# Patient Record
Sex: FEMALE | Race: WHITE | NOT HISPANIC OR LATINO | Employment: OTHER | URBAN - METROPOLITAN AREA
[De-identification: names, ages, dates, MRNs, and addresses within clinical notes are randomized per-mention and may not be internally consistent; named-entity substitution may affect disease eponyms.]

---

## 2023-05-19 PROBLEM — R29.6 MULTIPLE FALLS: Status: ACTIVE | Noted: 2023-05-19

## 2023-05-19 PROBLEM — R19.7 DIARRHEA: Status: ACTIVE | Noted: 2023-05-19

## 2023-05-19 PROBLEM — I95.1 CHRONIC ORTHOSTATIC HYPOTENSION: Status: ACTIVE | Noted: 2023-05-19

## 2023-05-19 PROBLEM — R35.1 NOCTURIA: Status: ACTIVE | Noted: 2023-05-19

## 2023-05-19 PROBLEM — N32.81 OVERACTIVE BLADDER: Status: ACTIVE | Noted: 2023-05-19

## 2023-05-19 PROBLEM — R39.15 URINARY URGENCY: Status: ACTIVE | Noted: 2023-05-19

## 2023-05-19 PROBLEM — N39.0 URINARY TRACT INFECTION: Status: ACTIVE | Noted: 2023-05-19

## 2023-05-19 PROBLEM — N36.41 URETHRAL HYPERMOBILITY: Status: ACTIVE | Noted: 2023-05-19

## 2023-05-19 PROBLEM — R07.89 CHEST PAIN, MIDSTERNAL: Status: ACTIVE | Noted: 2023-05-19

## 2023-05-19 PROBLEM — H40.1111 PRIMARY OPEN ANGLE GLAUCOMA (POAG) OF RIGHT EYE, MILD STAGE: Status: ACTIVE | Noted: 2023-05-19

## 2023-05-19 PROBLEM — H47.013 ISCHEMIC OPTIC NEUROPATHY, BILATERAL: Status: ACTIVE | Noted: 2023-05-19

## 2023-05-19 PROBLEM — N39.3 STRESS INCONTINENCE, FEMALE: Status: ACTIVE | Noted: 2023-05-19

## 2023-05-19 PROBLEM — H40.1190 PRIMARY OPEN ANGLE GLAUCOMA: Status: ACTIVE | Noted: 2023-05-19

## 2023-05-19 PROBLEM — R30.0 DYSURIA: Status: ACTIVE | Noted: 2023-05-19

## 2023-05-19 PROBLEM — I10 HTN (HYPERTENSION): Status: ACTIVE | Noted: 2023-05-19

## 2023-05-19 PROBLEM — H40.1132 CHRONIC OPEN ANGLE GLAUCOMA OF BOTH EYES, MODERATE STAGE: Status: ACTIVE | Noted: 2023-05-19

## 2023-05-19 PROBLEM — H91.91 HEARING LOSS, RIGHT: Status: ACTIVE | Noted: 2023-05-19

## 2023-05-19 PROBLEM — F03.90 DEMENTIA (MULTI): Status: ACTIVE | Noted: 2023-05-19

## 2023-05-19 PROBLEM — R10.9 ABDOMINAL CRAMPING: Status: ACTIVE | Noted: 2023-05-19

## 2023-05-19 PROBLEM — K62.5 RECTAL BLEEDING: Status: ACTIVE | Noted: 2023-05-19

## 2023-05-19 PROBLEM — H40.9 GLAUCOMA: Status: ACTIVE | Noted: 2023-05-19

## 2023-05-19 PROBLEM — N39.41 URGE INCONTINENCE OF URINE: Status: ACTIVE | Noted: 2023-05-19

## 2023-05-19 PROBLEM — Z96.1 PSEUDOPHAKIA OF BOTH EYES: Status: ACTIVE | Noted: 2023-05-19

## 2023-05-19 PROBLEM — R09.81 NASAL CONGESTION: Status: ACTIVE | Noted: 2023-05-19

## 2023-05-19 PROBLEM — R94.31 ABNORMAL EKG: Status: ACTIVE | Noted: 2023-05-19

## 2023-05-19 PROBLEM — R41.3 MEMORY CHANGES: Status: ACTIVE | Noted: 2023-05-19

## 2023-05-19 RX ORDER — BIMATOPROST 0.1 MG/ML
SOLUTION/ DROPS OPHTHALMIC
COMMUNITY
End: 2024-06-07 | Stop reason: WASHOUT

## 2023-05-19 RX ORDER — MEMANTINE HYDROCHLORIDE 10 MG/1
1 TABLET ORAL 2 TIMES DAILY
COMMUNITY
Start: 2022-04-22 | End: 2024-05-08 | Stop reason: SDUPTHER

## 2023-05-19 RX ORDER — LISINOPRIL 40 MG/1
1 TABLET ORAL DAILY
COMMUNITY
End: 2023-10-02

## 2023-05-19 RX ORDER — LEVOFLOXACIN 250 MG/1
1 TABLET ORAL DAILY
COMMUNITY
Start: 2022-03-30 | End: 2023-05-26 | Stop reason: ALTCHOICE

## 2023-05-19 RX ORDER — VIBEGRON 75 MG/1
1 TABLET, FILM COATED ORAL EVERY OTHER DAY
COMMUNITY
Start: 2022-10-07 | End: 2023-10-10 | Stop reason: SDUPTHER

## 2023-05-19 RX ORDER — MIRABEGRON 25 MG/1
1 TABLET, FILM COATED, EXTENDED RELEASE ORAL DAILY
COMMUNITY
End: 2023-05-26

## 2023-05-19 RX ORDER — LATANOPROST 50 UG/ML
1 SOLUTION/ DROPS OPHTHALMIC
COMMUNITY
Start: 2020-12-04 | End: 2023-11-30 | Stop reason: WASHOUT

## 2023-05-19 RX ORDER — VIT C/E/ZN/COPPR/LUTEIN/ZEAXAN 250MG-90MG
CAPSULE ORAL
COMMUNITY

## 2023-05-19 RX ORDER — DONEPEZIL HYDROCHLORIDE 5 MG/1
TABLET, FILM COATED ORAL
COMMUNITY
End: 2023-11-30 | Stop reason: WASHOUT

## 2023-05-19 RX ORDER — ESCITALOPRAM OXALATE 5 MG/1
1 TABLET ORAL DAILY
COMMUNITY
Start: 2023-01-25 | End: 2023-05-26

## 2023-05-19 RX ORDER — AZELASTINE 1 MG/ML
SPRAY, METERED NASAL 2 TIMES DAILY
COMMUNITY
End: 2023-11-30 | Stop reason: WASHOUT

## 2023-05-19 RX ORDER — OMEPRAZOLE 20 MG/1
20 CAPSULE, DELAYED RELEASE ORAL DAILY PRN
COMMUNITY
Start: 2022-12-08 | End: 2024-06-07 | Stop reason: WASHOUT

## 2023-05-19 RX ORDER — AMLODIPINE BESYLATE 10 MG/1
1 TABLET ORAL DAILY
COMMUNITY
Start: 2022-03-30 | End: 2024-01-24 | Stop reason: SDUPTHER

## 2023-05-26 ENCOUNTER — OFFICE VISIT (OUTPATIENT)
Dept: PRIMARY CARE | Facility: CLINIC | Age: 88
End: 2023-05-26
Payer: MEDICARE

## 2023-05-26 VITALS
HEIGHT: 60 IN | SYSTOLIC BLOOD PRESSURE: 126 MMHG | HEART RATE: 62 BPM | DIASTOLIC BLOOD PRESSURE: 64 MMHG | RESPIRATION RATE: 18 BRPM | BODY MASS INDEX: 31.22 KG/M2 | WEIGHT: 159 LBS

## 2023-05-26 DIAGNOSIS — N32.81 OVERACTIVE BLADDER: ICD-10-CM

## 2023-05-26 DIAGNOSIS — Z00.00 ROUTINE GENERAL MEDICAL EXAMINATION AT A HEALTH CARE FACILITY: ICD-10-CM

## 2023-05-26 DIAGNOSIS — Z00.00 MEDICARE ANNUAL WELLNESS VISIT, SUBSEQUENT: Primary | ICD-10-CM

## 2023-05-26 DIAGNOSIS — F03.B0 MODERATE DEMENTIA, UNSPECIFIED DEMENTIA TYPE, UNSPECIFIED WHETHER BEHAVIORAL, PSYCHOTIC, OR MOOD DISTURBANCE OR ANXIETY (MULTI): ICD-10-CM

## 2023-05-26 DIAGNOSIS — H40.1132 CHRONIC OPEN ANGLE GLAUCOMA OF BOTH EYES, MODERATE STAGE: ICD-10-CM

## 2023-05-26 DIAGNOSIS — R39.15 URINARY URGENCY: ICD-10-CM

## 2023-05-26 DIAGNOSIS — I10 PRIMARY HYPERTENSION: ICD-10-CM

## 2023-05-26 PROBLEM — K62.5 RECTAL BLEEDING: Status: RESOLVED | Noted: 2023-05-19 | Resolved: 2023-05-26

## 2023-05-26 PROBLEM — R94.31 ABNORMAL EKG: Status: RESOLVED | Noted: 2023-05-19 | Resolved: 2023-05-26

## 2023-05-26 PROBLEM — N39.0 URINARY TRACT INFECTION: Status: RESOLVED | Noted: 2023-05-19 | Resolved: 2023-05-26

## 2023-05-26 PROBLEM — R19.7 DIARRHEA: Status: RESOLVED | Noted: 2023-05-19 | Resolved: 2023-05-26

## 2023-05-26 PROBLEM — R07.89 CHEST PAIN, MIDSTERNAL: Status: RESOLVED | Noted: 2023-05-19 | Resolved: 2023-05-26

## 2023-05-26 PROBLEM — R35.1 NOCTURIA: Status: RESOLVED | Noted: 2023-05-19 | Resolved: 2023-05-26

## 2023-05-26 PROBLEM — H47.013 ISCHEMIC OPTIC NEUROPATHY, BILATERAL: Status: RESOLVED | Noted: 2023-05-19 | Resolved: 2023-05-26

## 2023-05-26 PROBLEM — R30.0 DYSURIA: Status: RESOLVED | Noted: 2023-05-19 | Resolved: 2023-05-26

## 2023-05-26 PROBLEM — R29.6 MULTIPLE FALLS: Status: RESOLVED | Noted: 2023-05-19 | Resolved: 2023-05-26

## 2023-05-26 PROBLEM — R10.9 ABDOMINAL CRAMPING: Status: RESOLVED | Noted: 2023-05-19 | Resolved: 2023-05-26

## 2023-05-26 PROBLEM — R09.81 NASAL CONGESTION: Status: RESOLVED | Noted: 2023-05-19 | Resolved: 2023-05-26

## 2023-05-26 PROBLEM — I95.1 CHRONIC ORTHOSTATIC HYPOTENSION: Status: RESOLVED | Noted: 2023-05-19 | Resolved: 2023-05-26

## 2023-05-26 LAB
ALANINE AMINOTRANSFERASE (SGPT) (U/L) IN SER/PLAS: 18 U/L (ref 7–45)
ALBUMIN (G/DL) IN SER/PLAS: 3.9 G/DL (ref 3.4–5)
ALKALINE PHOSPHATASE (U/L) IN SER/PLAS: 86 U/L (ref 33–136)
ANION GAP IN SER/PLAS: 13 MMOL/L (ref 10–20)
ASPARTATE AMINOTRANSFERASE (SGOT) (U/L) IN SER/PLAS: 22 U/L (ref 9–39)
BILIRUBIN TOTAL (MG/DL) IN SER/PLAS: 0.5 MG/DL (ref 0–1.2)
CALCIUM (MG/DL) IN SER/PLAS: 9.5 MG/DL (ref 8.6–10.6)
CARBON DIOXIDE, TOTAL (MMOL/L) IN SER/PLAS: 28 MMOL/L (ref 21–32)
CHLORIDE (MMOL/L) IN SER/PLAS: 104 MMOL/L (ref 98–107)
CREATININE (MG/DL) IN SER/PLAS: 0.84 MG/DL (ref 0.5–1.05)
ERYTHROCYTE DISTRIBUTION WIDTH (RATIO) BY AUTOMATED COUNT: 13.2 % (ref 11.5–14.5)
ERYTHROCYTE MEAN CORPUSCULAR HEMOGLOBIN CONCENTRATION (G/DL) BY AUTOMATED: 30.9 G/DL (ref 32–36)
ERYTHROCYTE MEAN CORPUSCULAR VOLUME (FL) BY AUTOMATED COUNT: 100 FL (ref 80–100)
ERYTHROCYTES (10*6/UL) IN BLOOD BY AUTOMATED COUNT: 4.3 X10E12/L (ref 4–5.2)
GFR FEMALE: 67 ML/MIN/1.73M2
GLUCOSE (MG/DL) IN SER/PLAS: 117 MG/DL (ref 74–99)
HEMATOCRIT (%) IN BLOOD BY AUTOMATED COUNT: 43.1 % (ref 36–46)
HEMOGLOBIN (G/DL) IN BLOOD: 13.3 G/DL (ref 12–16)
LEUKOCYTES (10*3/UL) IN BLOOD BY AUTOMATED COUNT: 5.9 X10E9/L (ref 4.4–11.3)
NRBC (PER 100 WBCS) BY AUTOMATED COUNT: 0 /100 WBC (ref 0–0)
PLATELETS (10*3/UL) IN BLOOD AUTOMATED COUNT: 267 X10E9/L (ref 150–450)
POTASSIUM (MMOL/L) IN SER/PLAS: 4 MMOL/L (ref 3.5–5.3)
PROTEIN TOTAL: 6.8 G/DL (ref 6.4–8.2)
SODIUM (MMOL/L) IN SER/PLAS: 141 MMOL/L (ref 136–145)
UREA NITROGEN (MG/DL) IN SER/PLAS: 28 MG/DL (ref 6–23)

## 2023-05-26 PROCEDURE — 3074F SYST BP LT 130 MM HG: CPT | Performed by: INTERNAL MEDICINE

## 2023-05-26 PROCEDURE — 1170F FXNL STATUS ASSESSED: CPT | Performed by: INTERNAL MEDICINE

## 2023-05-26 PROCEDURE — 3078F DIAST BP <80 MM HG: CPT | Performed by: INTERNAL MEDICINE

## 2023-05-26 PROCEDURE — G0439 PPPS, SUBSEQ VISIT: HCPCS | Performed by: INTERNAL MEDICINE

## 2023-05-26 PROCEDURE — 99397 PER PM REEVAL EST PAT 65+ YR: CPT | Performed by: INTERNAL MEDICINE

## 2023-05-26 PROCEDURE — 1159F MED LIST DOCD IN RCRD: CPT | Performed by: INTERNAL MEDICINE

## 2023-05-26 PROCEDURE — 1160F RVW MEDS BY RX/DR IN RCRD: CPT | Performed by: INTERNAL MEDICINE

## 2023-05-26 PROCEDURE — 80053 COMPREHEN METABOLIC PANEL: CPT

## 2023-05-26 PROCEDURE — 85027 COMPLETE CBC AUTOMATED: CPT

## 2023-05-26 PROCEDURE — 1036F TOBACCO NON-USER: CPT | Performed by: INTERNAL MEDICINE

## 2023-05-26 ASSESSMENT — ENCOUNTER SYMPTOMS
HEADACHES: 0
PHOTOPHOBIA: 0
CHOKING: 0
LOSS OF SENSATION IN FEET: 0
ABDOMINAL PAIN: 1
POLYPHAGIA: 0
APNEA: 0
ACTIVITY CHANGE: 0
FEVER: 0
NERVOUS/ANXIOUS: 0
VOICE CHANGE: 0
EYE REDNESS: 0
MYALGIAS: 0
SEIZURES: 0
CHEST TIGHTNESS: 0
COUGH: 0
ADENOPATHY: 0
DECREASED CONCENTRATION: 0
DIZZINESS: 0
DIAPHORESIS: 0
EYE PAIN: 0
AGITATION: 0
TREMORS: 0
WEAKNESS: 0
ARTHRALGIAS: 0
PALPITATIONS: 0
SPEECH DIFFICULTY: 0
DYSPHORIC MOOD: 0
POLYDIPSIA: 0
FACIAL ASYMMETRY: 0
TROUBLE SWALLOWING: 0
RHINORRHEA: 0
EYE ITCHING: 0
BLOOD IN STOOL: 0
UNEXPECTED WEIGHT CHANGE: 0
NECK STIFFNESS: 0
SINUS PRESSURE: 0
BACK PAIN: 0
APPETITE CHANGE: 0
DYSURIA: 0
BRUISES/BLEEDS EASILY: 0
EYE DISCHARGE: 0
ANAL BLEEDING: 0
WHEEZING: 0
RECTAL PAIN: 0
HYPERACTIVE: 0
SHORTNESS OF BREATH: 0
ABDOMINAL DISTENTION: 0
VOMITING: 0
DEPRESSION: 0
HALLUCINATIONS: 0
FLANK PAIN: 0
CONSTIPATION: 0
NAUSEA: 0
OCCASIONAL FEELINGS OF UNSTEADINESS: 1
FACIAL SWELLING: 0
SORE THROAT: 0
HEMATURIA: 0
WOUND: 0
DIARRHEA: 0
SLEEP DISTURBANCE: 0
LIGHT-HEADEDNESS: 0
NECK PAIN: 0
FREQUENCY: 1
SINUS PAIN: 0
CHILLS: 0
STRIDOR: 0
COLOR CHANGE: 0
FATIGUE: 0
DIFFICULTY URINATING: 0
CONFUSION: 1
NUMBNESS: 0
JOINT SWELLING: 0

## 2023-05-26 ASSESSMENT — ACTIVITIES OF DAILY LIVING (ADL)
TAKING_MEDICATION: TOTAL CARE
BATHING: INDEPENDENT
DRESSING: INDEPENDENT
GROCERY_SHOPPING: TOTAL CARE
MANAGING_FINANCES: TOTAL CARE
DOING_HOUSEWORK: NEEDS ASSISTANCE

## 2023-05-26 ASSESSMENT — PATIENT HEALTH QUESTIONNAIRE - PHQ9
SUM OF ALL RESPONSES TO PHQ9 QUESTIONS 1 AND 2: 0
2. FEELING DOWN, DEPRESSED OR HOPELESS: NOT AT ALL
1. LITTLE INTEREST OR PLEASURE IN DOING THINGS: NOT AT ALL

## 2023-05-26 NOTE — PROGRESS NOTES
Subjective   Reason for Visit: Jessy Masterson is an 87 y.o. female here for a Medicare Wellness visit.          Reviewed all medications by prescribing practitioner or clinical pharmacist (such as prescriptions, OTCs, herbal therapies and supplements) and documented in the medical record.    HPI  Pt here for MWV.  She is down in weight-10 lbs since last visit.  She does not get flu shots.  She only had 2 Covid vaccines.  Her daughter is not aware if she had pneumonia or shingles vaccines when living in [place] (moved here in 2020).      She is on medication for her memory.  She saw Dr. Arambula for this.  She was started on Lexapro but her daughter has since stopped use.  She does not feel her mother is depressed or anxious.  She is very calm.      She is on medication for OAB.  She uses the medication every other day as she gets samples from urologist.  She does note improvements in frequency.      She saw eye doc in March for her Glaucoma.  She is on two eye drops for this.          Patient Care Team:  Angelita CHAPMAN DO as PCP - General  Angelita CHAPMAN DO as PCP - [employer] PCP     Review of Systems   Constitutional:  Negative for activity change, appetite change, chills, diaphoresis, fatigue, fever and unexpected weight change.   HENT:  Positive for hearing loss. Negative for congestion, dental problem, drooling, ear discharge, ear pain, facial swelling, mouth sores, nosebleeds, postnasal drip, rhinorrhea, sinus pressure, sinus pain, sneezing, sore throat, tinnitus, trouble swallowing and voice change.    Eyes:  Positive for visual disturbance (goes regularly to eye doc due to glaucoma history). Negative for photophobia, pain, discharge, redness and itching.   Respiratory:  Negative for apnea, cough, choking, chest tightness, shortness of breath, wheezing and stridor.    Cardiovascular:  Negative for chest pain, palpitations and leg swelling.   Gastrointestinal:  Positive for abdominal pain  (+epigastric pain). Negative for abdominal distention, anal bleeding, blood in stool, constipation, diarrhea, nausea, rectal pain and vomiting.   Endocrine: Negative for cold intolerance, heat intolerance, polydipsia, polyphagia and polyuria.   Genitourinary:  Positive for frequency. Negative for decreased urine volume, difficulty urinating, dyspareunia, dysuria, enuresis, flank pain, genital sores, hematuria, menstrual problem, pelvic pain, urgency, vaginal bleeding, vaginal discharge and vaginal pain.   Musculoskeletal:  Negative for arthralgias, back pain, gait problem, joint swelling, myalgias, neck pain and neck stiffness.   Skin:  Negative for color change, rash and wound.   Allergic/Immunologic: Negative for environmental allergies and food allergies.   Neurological:  Negative for dizziness, tremors, seizures, syncope, facial asymmetry, speech difficulty, weakness, light-headedness, numbness and headaches.   Hematological:  Negative for adenopathy. Does not bruise/bleed easily.   Psychiatric/Behavioral:  Positive for confusion. Negative for agitation, behavioral problems, decreased concentration, dysphoric mood, hallucinations, self-injury, sleep disturbance and suicidal ideas. The patient is not nervous/anxious and is not hyperactive.        Objective   Vitals:  /64   Pulse 62   Resp 18   Ht 1.524 m (5')   Wt 72.1 kg (159 lb)   BMI 31.05 kg/m²       Physical Exam  Constitutional:       Appearance: Normal appearance. She is obese.   HENT:      Right Ear: Tympanic membrane normal.      Left Ear: Tympanic membrane normal.      Nose: Nose normal.      Mouth/Throat:      Mouth: Mucous membranes are moist.      Pharynx: Oropharynx is clear.   Eyes:      Extraocular Movements: Extraocular movements intact.      Conjunctiva/sclera: Conjunctivae normal.      Pupils: Pupils are equal, round, and reactive to light.   Cardiovascular:      Rate and Rhythm: Normal rate and regular rhythm.      Heart sounds:  Normal heart sounds.   Pulmonary:      Effort: Pulmonary effort is normal.      Breath sounds: Normal breath sounds.   Abdominal:      General: Abdomen is flat. Bowel sounds are normal. There is no distension.      Palpations: Abdomen is soft. There is no mass.      Tenderness: There is no abdominal tenderness.   Musculoskeletal:         General: No swelling or tenderness. Normal range of motion.   Lymphadenopathy:      Cervical: No cervical adenopathy.   Skin:     General: Skin is warm and dry.   Neurological:      Mental Status: She is alert and oriented to person, place, and time.   Psychiatric:         Mood and Affect: Mood normal.         Assessment/Plan   Problem List Items Addressed This Visit          Nervous    Dementia (CMS/HCC)    Current Assessment & Plan     On Namenda BID  Memory stable; no behavioral issues at this time  Not on Lexapro as daughter doesn't feel her mood is an issue         Relevant Orders    Follow Up In Primary Care       Circulatory    HTN (hypertension)    Current Assessment & Plan     Well controlled on Amlodipine/Lisinopril          Relevant Orders    Comprehensive Metabolic Panel    CBC    Follow Up In Primary Care       Genitourinary    Overactive bladder    Current Assessment & Plan     Gets samples of Gemtesa from urology  This is helpful; she takes every other day             Other    Chronic open angle glaucoma of both eyes, moderate stage    Current Assessment & Plan     Sees optho regularly  On 2 drops          Urinary urgency     Other Visit Diagnoses       Medicare annual wellness visit, subsequent    -  Primary    Routine general medical examination at a health care facility

## 2023-05-26 NOTE — PATIENT INSTRUCTIONS
Take the Omeprazole 20 mg daily for next 7-10 days and then can try to wean off again; if she complains of that mid abdominal upper discomfort then restart use and use daily  We did a bit of blood work today-will call if anything is abnormal  Ask your brother if mom got her Pneumonia and Shingles vaccines when in [place]  Continue medications as directed  Follow up here in 6 months

## 2023-05-26 NOTE — ASSESSMENT & PLAN NOTE
On Namenda BID  Memory stable; no behavioral issues at this time  Not on Lexapro as daughter doesn't feel her mood is an issue

## 2023-10-02 DIAGNOSIS — I10 HYPERTENSION, UNSPECIFIED TYPE: ICD-10-CM

## 2023-10-02 PROBLEM — J30.0 VASOMOTOR RHINITIS: Status: ACTIVE | Noted: 2022-09-10

## 2023-10-02 PROBLEM — R49.0 CHRONIC HOARSENESS: Status: ACTIVE | Noted: 2022-09-10

## 2023-10-02 PROBLEM — J37.0 CHRONIC LARYNGITIS: Status: ACTIVE | Noted: 2022-09-10

## 2023-10-02 PROBLEM — K21.9 GASTROESOPHAGEAL REFLUX DISEASE WITHOUT ESOPHAGITIS: Status: ACTIVE | Noted: 2022-09-10

## 2023-10-02 PROBLEM — H90.3 SENSORINEURAL HEARING LOSS, BILATERAL: Status: ACTIVE | Noted: 2022-12-17

## 2023-10-02 RX ORDER — LISINOPRIL 40 MG/1
40 TABLET ORAL DAILY
Qty: 90 TABLET | Refills: 3 | Status: SHIPPED | OUTPATIENT
Start: 2023-10-02

## 2023-10-10 DIAGNOSIS — N32.81 OAB (OVERACTIVE BLADDER): Primary | ICD-10-CM

## 2023-10-10 RX ORDER — VIBEGRON 75 MG/1
1 TABLET, FILM COATED ORAL EVERY OTHER DAY
Qty: 30 TABLET | Refills: 3 | Status: SHIPPED | OUTPATIENT
Start: 2023-10-10 | End: 2023-11-30 | Stop reason: WASHOUT

## 2023-11-09 PROBLEM — R09.89 CHRONIC THROAT CLEARING: Status: ACTIVE | Noted: 2023-11-09

## 2023-11-09 PROBLEM — R07.89 CHEST PAIN, MIDSTERNAL: Status: ACTIVE | Noted: 2023-11-09

## 2023-11-09 PROBLEM — I95.1 CHRONIC ORTHOSTATIC HYPOTENSION: Status: ACTIVE | Noted: 2023-11-09

## 2023-11-09 PROBLEM — J38.02 BILATERAL PARTIAL VOCAL CORD PARALYSIS: Status: ACTIVE | Noted: 2023-11-09

## 2023-11-09 PROBLEM — R94.31 ABNORMAL EKG: Status: ACTIVE | Noted: 2023-11-09

## 2023-11-09 PROBLEM — R10.9 ABDOMINAL CRAMPING: Status: ACTIVE | Noted: 2023-11-09

## 2023-11-09 PROBLEM — J30.1 ALLERGIC RHINITIS DUE TO POLLEN: Status: ACTIVE | Noted: 2023-11-09

## 2023-11-09 PROBLEM — R09.81 NASAL CONGESTION: Status: ACTIVE | Noted: 2023-11-09

## 2023-11-09 PROBLEM — R29.6 MULTIPLE FALLS: Status: ACTIVE | Noted: 2023-11-09

## 2023-11-09 PROBLEM — R19.7 DIARRHEA: Status: ACTIVE | Noted: 2023-11-09

## 2023-11-09 PROBLEM — K62.5 RECTAL BLEEDING: Status: ACTIVE | Noted: 2023-11-09

## 2023-11-09 PROBLEM — H47.013 ISCHEMIC OPTIC NEUROPATHY, BILATERAL: Status: ACTIVE | Noted: 2023-11-09

## 2023-11-09 PROBLEM — H40.9 GLAUCOMA: Status: ACTIVE | Noted: 2023-11-09

## 2023-11-09 PROBLEM — H90.3 ASYMMETRICAL SENSORINEURAL HEARING LOSS: Status: ACTIVE | Noted: 2022-12-17

## 2023-11-09 PROBLEM — R35.1 NOCTURIA: Status: ACTIVE | Noted: 2023-11-09

## 2023-11-09 PROBLEM — N39.0 URINARY TRACT INFECTION: Status: ACTIVE | Noted: 2023-11-09

## 2023-11-09 PROBLEM — R41.3 MEMORY CHANGES: Status: ACTIVE | Noted: 2023-11-09

## 2023-11-09 PROBLEM — R30.0 DYSURIA: Status: ACTIVE | Noted: 2023-11-09

## 2023-11-09 PROBLEM — H40.1111 PRIMARY OPEN ANGLE GLAUCOMA (POAG) OF RIGHT EYE, MILD STAGE: Status: ACTIVE | Noted: 2023-11-09

## 2023-11-09 PROBLEM — H40.1190 PRIMARY OPEN ANGLE GLAUCOMA: Status: ACTIVE | Noted: 2023-11-09

## 2023-11-09 RX ORDER — MONTELUKAST SODIUM 10 MG/1
1 TABLET ORAL DAILY
COMMUNITY
End: 2023-11-30 | Stop reason: WASHOUT

## 2023-11-09 RX ORDER — MINERAL OIL
1 ENEMA (ML) RECTAL DAILY
COMMUNITY
Start: 2023-03-18 | End: 2023-11-30 | Stop reason: WASHOUT

## 2023-11-09 RX ORDER — IPRATROPIUM BROMIDE 42 UG/1
SPRAY, METERED NASAL
COMMUNITY
End: 2023-11-30 | Stop reason: WASHOUT

## 2023-11-09 RX ORDER — DONEPEZIL HYDROCHLORIDE 5 MG/1
1 TABLET, FILM COATED ORAL DAILY
COMMUNITY
End: 2023-11-30 | Stop reason: WASHOUT

## 2023-11-09 RX ORDER — PANTOPRAZOLE SODIUM 20 MG/1
1 TABLET, DELAYED RELEASE ORAL DAILY
COMMUNITY
End: 2023-11-30 | Stop reason: WASHOUT

## 2023-11-09 RX ORDER — AMLODIPINE BESYLATE 5 MG/1
5 TABLET ORAL DAILY
COMMUNITY
End: 2023-11-30 | Stop reason: WASHOUT

## 2023-11-09 RX ORDER — OMEPRAZOLE 20 MG/1
1 CAPSULE, DELAYED RELEASE ORAL
COMMUNITY
Start: 2022-12-08 | End: 2023-11-30 | Stop reason: WASHOUT

## 2023-11-09 RX ORDER — DULOXETIN HYDROCHLORIDE 20 MG/1
20 CAPSULE, DELAYED RELEASE ORAL DAILY
COMMUNITY
End: 2023-11-30 | Stop reason: WASHOUT

## 2023-11-09 RX ORDER — MEMANTINE HYDROCHLORIDE 5 MG/1
TABLET ORAL
COMMUNITY
End: 2023-11-30 | Stop reason: WASHOUT

## 2023-11-09 RX ORDER — NITROFURANTOIN 25; 75 MG/1; MG/1
CAPSULE ORAL
COMMUNITY
End: 2023-11-30 | Stop reason: ALTCHOICE

## 2023-11-09 RX ORDER — VIBEGRON 75 MG/1
1 TABLET, FILM COATED ORAL DAILY
COMMUNITY
Start: 2022-10-07 | End: 2023-11-30 | Stop reason: WASHOUT

## 2023-11-09 RX ORDER — LEVOFLOXACIN 250 MG/1
1 TABLET ORAL DAILY
COMMUNITY
End: 2023-11-30 | Stop reason: WASHOUT

## 2023-11-09 RX ORDER — ESCITALOPRAM OXALATE 5 MG/1
1 TABLET ORAL DAILY
COMMUNITY
End: 2023-11-30 | Stop reason: WASHOUT

## 2023-11-09 RX ORDER — NAPROXEN 375 MG/1
1 TABLET ORAL 2 TIMES DAILY
COMMUNITY
End: 2023-11-30 | Stop reason: WASHOUT

## 2023-11-09 RX ORDER — METOPROLOL SUCCINATE 25 MG/1
1 TABLET, EXTENDED RELEASE ORAL DAILY
COMMUNITY
End: 2023-11-30 | Stop reason: WASHOUT

## 2023-11-09 RX ORDER — FLUTICASONE PROPIONATE 50 MCG
SPRAY, SUSPENSION (ML) NASAL
COMMUNITY
End: 2023-11-30 | Stop reason: WASHOUT

## 2023-11-09 RX ORDER — BIMATOPROST 0.1 MG/ML
1 SOLUTION/ DROPS OPHTHALMIC NIGHTLY
COMMUNITY
End: 2023-11-30 | Stop reason: WASHOUT

## 2023-11-09 RX ORDER — ESTRADIOL 0.1 MG/G
CREAM VAGINAL
COMMUNITY
End: 2023-11-30 | Stop reason: WASHOUT

## 2023-11-09 RX ORDER — OMEPRAZOLE 40 MG/1
1 CAPSULE, DELAYED RELEASE ORAL DAILY
COMMUNITY
End: 2023-11-30 | Stop reason: WASHOUT

## 2023-11-30 ENCOUNTER — OFFICE VISIT (OUTPATIENT)
Dept: PRIMARY CARE | Facility: CLINIC | Age: 88
End: 2023-11-30
Payer: MEDICARE

## 2023-11-30 ENCOUNTER — TELEPHONE (OUTPATIENT)
Dept: PRIMARY CARE | Facility: CLINIC | Age: 88
End: 2023-11-30

## 2023-11-30 VITALS
BODY MASS INDEX: 32.36 KG/M2 | SYSTOLIC BLOOD PRESSURE: 124 MMHG | WEIGHT: 165.7 LBS | HEART RATE: 84 BPM | DIASTOLIC BLOOD PRESSURE: 62 MMHG

## 2023-11-30 DIAGNOSIS — N32.81 OVERACTIVE BLADDER: ICD-10-CM

## 2023-11-30 DIAGNOSIS — F03.B0 MODERATE DEMENTIA, UNSPECIFIED DEMENTIA TYPE, UNSPECIFIED WHETHER BEHAVIORAL, PSYCHOTIC, OR MOOD DISTURBANCE OR ANXIETY (MULTI): ICD-10-CM

## 2023-11-30 DIAGNOSIS — R23.4 CRACKED SKIN ON FEET: ICD-10-CM

## 2023-11-30 DIAGNOSIS — N39.3 STRESS INCONTINENCE, FEMALE: ICD-10-CM

## 2023-11-30 DIAGNOSIS — I10 PRIMARY HYPERTENSION: Primary | ICD-10-CM

## 2023-11-30 DIAGNOSIS — L98.9 CRACKING SKIN: Primary | ICD-10-CM

## 2023-11-30 PROBLEM — N39.0 URINARY TRACT INFECTION: Status: RESOLVED | Noted: 2023-11-09 | Resolved: 2023-11-30

## 2023-11-30 PROBLEM — R30.0 DYSURIA: Status: RESOLVED | Noted: 2023-11-09 | Resolved: 2023-11-30

## 2023-11-30 PROBLEM — K62.5 RECTAL BLEEDING: Status: RESOLVED | Noted: 2023-11-09 | Resolved: 2023-11-30

## 2023-11-30 PROBLEM — I95.1 CHRONIC ORTHOSTATIC HYPOTENSION: Status: RESOLVED | Noted: 2023-11-09 | Resolved: 2023-11-30

## 2023-11-30 PROBLEM — R10.9 ABDOMINAL CRAMPING: Status: RESOLVED | Noted: 2023-11-09 | Resolved: 2023-11-30

## 2023-11-30 PROBLEM — R19.7 DIARRHEA: Status: RESOLVED | Noted: 2023-11-09 | Resolved: 2023-11-30

## 2023-11-30 PROBLEM — R07.89 CHEST PAIN, MIDSTERNAL: Status: RESOLVED | Noted: 2023-11-09 | Resolved: 2023-11-30

## 2023-11-30 PROCEDURE — 1126F AMNT PAIN NOTED NONE PRSNT: CPT | Performed by: INTERNAL MEDICINE

## 2023-11-30 PROCEDURE — 3078F DIAST BP <80 MM HG: CPT | Performed by: INTERNAL MEDICINE

## 2023-11-30 PROCEDURE — 1160F RVW MEDS BY RX/DR IN RCRD: CPT | Performed by: INTERNAL MEDICINE

## 2023-11-30 PROCEDURE — 1036F TOBACCO NON-USER: CPT | Performed by: INTERNAL MEDICINE

## 2023-11-30 PROCEDURE — 99214 OFFICE O/P EST MOD 30 MIN: CPT | Performed by: INTERNAL MEDICINE

## 2023-11-30 PROCEDURE — 3074F SYST BP LT 130 MM HG: CPT | Performed by: INTERNAL MEDICINE

## 2023-11-30 PROCEDURE — 1159F MED LIST DOCD IN RCRD: CPT | Performed by: INTERNAL MEDICINE

## 2023-11-30 RX ORDER — UREA 40 G/100G
1 LOTION TOPICAL DAILY
Qty: 226.8 G | Refills: 0 | Status: SHIPPED | OUTPATIENT
Start: 2023-11-30 | End: 2023-11-30

## 2023-11-30 RX ORDER — DULOXETIN HYDROCHLORIDE 20 MG/1
20 CAPSULE, DELAYED RELEASE ORAL DAILY
Qty: 90 CAPSULE | Refills: 0
Start: 2023-11-30 | End: 2024-06-07 | Stop reason: WASHOUT

## 2023-11-30 ASSESSMENT — ENCOUNTER SYMPTOMS
FATIGUE: 0
HALLUCINATIONS: 0
LIGHT-HEADEDNESS: 0
DECREASED CONCENTRATION: 0
PALPITATIONS: 0
CHEST TIGHTNESS: 0
STRIDOR: 0
CHOKING: 0
DIFFICULTY URINATING: 0
CONFUSION: 1
NERVOUS/ANXIOUS: 0
HEADACHES: 0
DIZZINESS: 0
AGITATION: 0
ABDOMINAL PAIN: 0
CONSTIPATION: 0
CHILLS: 0
FEVER: 0
DYSPHORIC MOOD: 0
UNEXPECTED WEIGHT CHANGE: 0
ACTIVITY CHANGE: 0
APPETITE CHANGE: 0
COUGH: 0
WHEEZING: 0
APNEA: 0
NAUSEA: 0
HYPERACTIVE: 0
VOMITING: 0
SLEEP DISTURBANCE: 0
DIARRHEA: 0
SHORTNESS OF BREATH: 0

## 2023-11-30 NOTE — ASSESSMENT & PLAN NOTE
Pt s/p sling 6 weeks ago  Still having issues  Daughter is working with uro/gyn for injections to help improve symptoms further

## 2023-11-30 NOTE — PATIENT INSTRUCTIONS
Start Urea lotion to the feet/heels before bed to help with dried/cracked skin  Continue medications as directed  Consider flu shot and pneumonia shot  Continue to see GYN/urology for urinary issues  Continue medications for memory and mood  Follow up here in 6 months for full physical (will do blood work then)

## 2023-11-30 NOTE — TELEPHONE ENCOUNTER
Since the lotion you prescribed is not covered she will see a podiatrist, please enter referral and she will call.

## 2023-11-30 NOTE — PROGRESS NOTES
Subjective   Patient ID: Jessy Masterson is a 88 y.o. female who presents for Follow-up (6m).    HPI    Pt here for follow up.  She is with her daughter today.  Her memory has not worsened per daughter.  She is on Memantine for this.  She sees Dr. Mei who started her on Duloxetine back in 8/2023.  Her daughter tells me she was off and on this med so not sure if working.      Since previous visit has had bladder procedure/sling.  She saw the urogyn yesterday in follow up and it was noted that she is doing well.  She has occasional incontinence still but much improved.  Her daughter wants her to have injections due to continued issues.      She is taking her medications as directed.  She had a high BP reading at uron yesterday.  Her daughter tells me she complains of having headaches more.  She is not sure if related to BP.      She declines flu shot.  She often does not get vaccines.  She has minimal exposure.      She has dry skin of legs and cracking of her skin to feet.        Review of Systems   Constitutional:  Negative for activity change, appetite change, chills, fatigue, fever and unexpected weight change.   Respiratory:  Negative for apnea, cough, choking, chest tightness, shortness of breath, wheezing and stridor.    Cardiovascular:  Negative for chest pain, palpitations and leg swelling.   Gastrointestinal:  Negative for abdominal pain, constipation, diarrhea, nausea and vomiting.   Genitourinary:  Positive for urgency. Negative for difficulty urinating.        Incontinence    Neurological:  Negative for dizziness, light-headedness and headaches.   Psychiatric/Behavioral:  Positive for confusion. Negative for agitation, behavioral problems, decreased concentration, dysphoric mood, hallucinations, self-injury, sleep disturbance and suicidal ideas. The patient is not nervous/anxious and is not hyperactive.        Objective   /62 (BP Location: Left arm, Patient Position: Sitting)   Pulse  84   Wt 75.2 kg (165 lb 11.2 oz)   BMI 32.36 kg/m²    Physical Exam  Constitutional:       Appearance: Normal appearance.   Cardiovascular:      Rate and Rhythm: Normal rate and regular rhythm.      Heart sounds: Normal heart sounds.   Pulmonary:      Effort: Pulmonary effort is normal.      Breath sounds: Normal breath sounds.   Abdominal:      General: Bowel sounds are normal.      Palpations: Abdomen is soft.   Musculoskeletal:      Right lower leg: No edema.      Left lower leg: No edema.   Lymphadenopathy:      Cervical: No cervical adenopathy.   Neurological:      Mental Status: She is alert and oriented to person, place, and time.      Comments: Pt with language barrier but appears to know where she is, who her daughter is and that I am her doctor    Psychiatric:         Mood and Affect: Mood normal.         Assessment/Plan   Problem List Items Addressed This Visit       Dementia (CMS/HCC)     Pt on Namenda and now low dose Duloxetine due to some lower mood through neuro  She has seen Dr. Mei in past          Relevant Medications    DULoxetine (Cymbalta) 20 mg DR capsule    Other Relevant Orders    Follow Up In Primary Care - Medicare Annual    HTN (hypertension) - Primary    Relevant Orders    Follow Up In Primary Care - Medicare Annual    Overactive bladder     Pt s/p sling 6 weeks ago  Still having issues  Daughter is working with uro/gyn for injections to help improve symptoms further          Relevant Orders    Follow Up In Primary Care - Medicare Annual    Stress incontinence, female    Relevant Orders    Follow Up In Primary Care - Medicare Annual     Other Visit Diagnoses       Cracked skin on feet        Relevant Medications    urea 40 % lotion    Other Relevant Orders    Follow Up In Primary Care - Medicare Annual

## 2024-01-24 DIAGNOSIS — I10 HYPERTENSION, UNSPECIFIED TYPE: ICD-10-CM

## 2024-01-24 RX ORDER — AMLODIPINE BESYLATE 10 MG/1
10 TABLET ORAL DAILY
Qty: 90 TABLET | Refills: 1 | Status: SHIPPED | OUTPATIENT
Start: 2024-01-24 | End: 2024-01-31 | Stop reason: SDUPTHER

## 2024-01-30 ENCOUNTER — TELEPHONE (OUTPATIENT)
Dept: PRIMARY CARE | Facility: CLINIC | Age: 89
End: 2024-01-30
Payer: MEDICARE

## 2024-01-30 DIAGNOSIS — I10 HYPERTENSION, UNSPECIFIED TYPE: ICD-10-CM

## 2024-01-30 NOTE — TELEPHONE ENCOUNTER
Her med list as far back as her visit in 5/2023 shows 10 mg-I don't see where it would of been decreased-

## 2024-01-30 NOTE — TELEPHONE ENCOUNTER
Went to  amlodipine 5 mg and 10 mg was called in, she has always taken 5 mg, not sure why it was sent over for 10 mg.  Can you change back  to 5 mg   Unless was changed and you wanted 10 mg, but daughter is unaware of any changes.               [phone]

## 2024-01-30 NOTE — TELEPHONE ENCOUNTER
I spoke with Miracle's daughter and she is insisting it should be 5 mg, so I called her pharmacy and they have been filling 5 mg since 12/08/2022 last fill was 09/30/23

## 2024-01-31 RX ORDER — AMLODIPINE BESYLATE 5 MG/1
5 TABLET ORAL DAILY
Qty: 90 TABLET | Refills: 1 | Status: SHIPPED | OUTPATIENT
Start: 2024-01-31

## 2024-02-07 ENCOUNTER — OFFICE VISIT (OUTPATIENT)
Dept: PODIATRY | Facility: CLINIC | Age: 89
End: 2024-02-07
Payer: MEDICARE

## 2024-02-07 DIAGNOSIS — B35.1 ONYCHOMYCOSIS: Primary | ICD-10-CM

## 2024-02-07 DIAGNOSIS — I87.2 CHRONIC VENOUS INSUFFICIENCY OF LOWER EXTREMITY: ICD-10-CM

## 2024-02-07 DIAGNOSIS — B35.3 TINEA PEDIS OF BOTH FEET: ICD-10-CM

## 2024-02-07 PROCEDURE — 99203 OFFICE O/P NEW LOW 30 MIN: CPT | Performed by: PODIATRIST

## 2024-02-07 PROCEDURE — 1036F TOBACCO NON-USER: CPT | Performed by: PODIATRIST

## 2024-02-07 PROCEDURE — 1159F MED LIST DOCD IN RCRD: CPT | Performed by: PODIATRIST

## 2024-02-07 PROCEDURE — 1126F AMNT PAIN NOTED NONE PRSNT: CPT | Performed by: PODIATRIST

## 2024-02-07 RX ORDER — ESTRADIOL 0.1 MG/G
CREAM VAGINAL
COMMUNITY
Start: 2024-02-05 | End: 2024-06-07 | Stop reason: WASHOUT

## 2024-02-07 RX ORDER — KETOCONAZOLE 20 MG/G
CREAM TOPICAL 2 TIMES DAILY
Qty: 60 G | Refills: 3 | Status: SHIPPED | OUTPATIENT
Start: 2024-02-07 | End: 2024-06-07 | Stop reason: WASHOUT

## 2024-02-07 RX ORDER — CICLOPIROX 80 MG/ML
SOLUTION TOPICAL DAILY
Qty: 6.6 ML | Refills: 11 | Status: SHIPPED | OUTPATIENT
Start: 2024-02-07 | End: 2024-06-07 | Stop reason: WASHOUT

## 2024-02-07 RX ORDER — ESCITALOPRAM OXALATE 5 MG/1
1 TABLET ORAL DAILY
COMMUNITY
End: 2024-06-07 | Stop reason: WASHOUT

## 2024-02-07 RX ORDER — LATANOPROST 50 UG/ML
SOLUTION/ DROPS OPHTHALMIC
COMMUNITY

## 2024-02-07 NOTE — PROGRESS NOTES
Chief Complaint   Patient presents with    Nail Problem     New patient is here today with fungus right hallux. Presents with daughter for interpretor. Complains of dry skin and occasionally itching in between toes. Edema in legs JOSSIE.      C/O painful fungal right hallux nail.  Patient is also complaining of itching between her toes.  C/O peeling skin. Patient is also complaining of lower extremity edema.  Has tried aloe cream.   Here with her daughter.    PMH, PSx, Medications and allergies reviewed.  ROS negative except for what is stated in HPI.    Phyiscal Exam  Patient alert, oriented, no acute distress    VASC: +2/4 pedal pulses B/L.  CFT brisk all digits.  Feet warm to touch.  Mild LE edema B/L and multiple varicosities.  Edema is nonpitting.  There is no weeping or bulla noted.    NEURO: Vibratory intact B/L.  Light touch intact B/L.     DERM:Nails 1R is thick, yellow, has subungual debris consistent with a superficial white fungal infection.  Nail is infected down to the proximal nail border.  No cellulitis noted.  Dry scaly skin noted in the interspaces more so on the right foot.    MUSCULOSKEL: +5/5 muscle strength B/L.    Decreased ankle joint range of motion bilaterally.    Assessment and Plan  #1 Onychomycosis  Discussed pathology treatment options  Rx Penlac topical nail solution  Apply every day, remove after 7 days and repeat the process  Follow-up 3 months    #2 Tinea pedis  Discussed pathology and treatment options  Rx: Ketoconazole cream apply this twice a day x 2 weeks  Patient needs to disinfect their shoes either with Lysol and let them dry overnight or with a UV light  which is available online.  Patient also needs to disinfected their socks and this can be done by adding a bleach or a color safe bleach product when washing them.  This does like to recur.  Follow-up 3 months    #3 Edema of the lower extremities  This is secondary to the varicose veins  Patient can elevate the legs  throughout the day  Monitor swelling  Follow-up as needed

## 2024-02-07 NOTE — PATIENT INSTRUCTIONS
#1 Onychomycosis  Discussed pathology treatment options  Rx Penlac topical nail solution  Apply every day, remove after 7 days and repeat the process  Follow-up 3 months    #2 Tinea pedis  Discussed pathology and treatment options  Rx: Ketoconazole cream apply this twice a day x 2 weeks  Patient needs to disinfect their shoes either with Lysol and let them dry overnight or with a UV light  which is available online.  Patient also needs to disinfected their socks and this can be done by adding a bleach or a color safe bleach product when washing them.  This does like to recur.  Follow-up 3 months    #3 Edema of the lower extremities  This is secondary to the varicose veins  Patient can elevate the legs throughout the day  Monitor swelling  Follow-up as needed

## 2024-02-12 ENCOUNTER — APPOINTMENT (OUTPATIENT)
Dept: ORTHOPEDIC SURGERY | Facility: CLINIC | Age: 89
End: 2024-02-12
Payer: MEDICARE

## 2024-02-12 DIAGNOSIS — M25.562 CHRONIC PAIN OF LEFT KNEE: Primary | ICD-10-CM

## 2024-02-12 DIAGNOSIS — G89.29 CHRONIC PAIN OF LEFT KNEE: Primary | ICD-10-CM

## 2024-02-13 ENCOUNTER — OFFICE VISIT (OUTPATIENT)
Dept: ORTHOPEDIC SURGERY | Facility: CLINIC | Age: 89
End: 2024-02-13
Payer: MEDICARE

## 2024-02-13 ENCOUNTER — HOSPITAL ENCOUNTER (OUTPATIENT)
Dept: RADIOLOGY | Facility: CLINIC | Age: 89
Discharge: HOME | End: 2024-02-13
Payer: MEDICARE

## 2024-02-13 VITALS — WEIGHT: 160 LBS | BODY MASS INDEX: 29.44 KG/M2 | HEIGHT: 62 IN

## 2024-02-13 DIAGNOSIS — G89.29 CHRONIC PAIN OF LEFT KNEE: ICD-10-CM

## 2024-02-13 DIAGNOSIS — M17.12 ARTHRITIS OF LEFT KNEE: Primary | ICD-10-CM

## 2024-02-13 DIAGNOSIS — M25.562 CHRONIC PAIN OF LEFT KNEE: ICD-10-CM

## 2024-02-13 PROCEDURE — 99203 OFFICE O/P NEW LOW 30 MIN: CPT

## 2024-02-13 PROCEDURE — 1159F MED LIST DOCD IN RCRD: CPT

## 2024-02-13 PROCEDURE — 1126F AMNT PAIN NOTED NONE PRSNT: CPT

## 2024-02-13 PROCEDURE — 73562 X-RAY EXAM OF KNEE 3: CPT | Mod: LEFT SIDE | Performed by: RADIOLOGY

## 2024-02-13 PROCEDURE — 73562 X-RAY EXAM OF KNEE 3: CPT | Mod: LT

## 2024-02-13 PROCEDURE — 20610 DRAIN/INJ JOINT/BURSA W/O US: CPT

## 2024-02-13 PROCEDURE — 1160F RVW MEDS BY RX/DR IN RCRD: CPT

## 2024-02-13 PROCEDURE — 1036F TOBACCO NON-USER: CPT

## 2024-02-13 RX ORDER — LIDOCAINE HYDROCHLORIDE 20 MG/ML
2 INJECTION, SOLUTION INFILTRATION; PERINEURAL
Status: COMPLETED | OUTPATIENT
Start: 2024-02-13 | End: 2024-02-13

## 2024-02-13 RX ORDER — TRIAMCINOLONE ACETONIDE 40 MG/ML
40 INJECTION, SUSPENSION INTRA-ARTICULAR; INTRAMUSCULAR
Status: COMPLETED | OUTPATIENT
Start: 2024-02-13 | End: 2024-02-13

## 2024-02-13 RX ADMIN — LIDOCAINE HYDROCHLORIDE 2 ML: 20 INJECTION, SOLUTION INFILTRATION; PERINEURAL at 18:09

## 2024-02-13 RX ADMIN — TRIAMCINOLONE ACETONIDE 40 MG: 40 INJECTION, SUSPENSION INTRA-ARTICULAR; INTRAMUSCULAR at 18:09

## 2024-02-13 ASSESSMENT — ENCOUNTER SYMPTOMS: KNEE DEFORMITY: 1

## 2024-02-13 NOTE — PROGRESS NOTES
Subjective    Patient ID: Jessy Masterson is a 88 y.o. female.    Chief Complaint: Pain of the Left Knee    Left Knee       This is a pleasant 88-year-old female presenting to the office with her daughter for evaluation of left knee pain, which has been ongoing for years, worsening over the last few weeks.  Patient does have the beginnings of dementia, therefore patient's daughter is answering most of patient's history questions.  Patient's daughter is also translating for her, as she speaks Polish.  Patient points to her kneecap when describing knee pain.  States that she feels her knee crack/pop.  She has been walking with a limp with use of a cane due to the pain.  Patient used to live in [place] with her son, and has moved to [place] with her daughter 4 years ago.  Patient explains that her mother did receive cortisone injections she believes while in [place].  She has not had any treatment for her left knee since she has been in [place].  Pain is worse with prolonged standing walking and stair climbing.  Patient does not like to use over-the-counter medications, but will occasionally apply topical creams.  There has been no recent injury.    The patient's past medical, surgical, family, and social history as well as allergies and medications were reviewed and updated in the chart.    Objective   Ortho Exam  Pleasant and no acute distress. Walks with a antalgic gait.  Bilateral knees appearing without soft tissue swelling erythema or ecchymosis.  There is no warmth upon touch.  Bilateral patellofemoral crepitus noted with range of motion testing.  Right knee range of motion is 5-110°. There is a mild effusion. The knee is stable to varus and valgus stress Lachman and posterior drawer. There is generalized tenderness. Left knee range of motion is 5-110°. There is a mild effusion. The knee is stable to varus and valgus stress Lachman and posterior drawer. There is generalized tenderness. Both lower  extremities are well perfused the skin is intact and muscle tone is adequate.      Image Results:  Multiple view x-rays of the left knee obtained today personally reviewed, without evidence of acute fracture or dislocation.  There are degenerative changes of the left knee noted in medial and patellofemoral compartments with peripheral osteophyte formation noted.    L Inj/Asp: L knee on 2/13/2024 6:09 PM  Indications: pain  Details: 22 G needle, superolateral approach  Medications: 40 mg triamcinolone acetonide 40 mg/mL; 2 mL lidocaine 20 mg/mL (2 %)  Procedure, treatment alternatives, risks and benefits explained, specific risks discussed. Consent was given by the patient.            Assessment/Plan   Encounter Diagnoses: Left knee pain, left knee arthritis    Plan: Discussion with patient and her daughter regarding left knee arthritis with review of today's x-rays.  Conservative and surgical treatment options were discussed at length.  Patient's daughter explains that surgical intervention would not be considered due to her mother's dementia and age.  She would like to proceed with conservative treatment options.  After the risks and benefits of the left knee intra-articular steroid injection of Kenalog/lidocaine was discussed, patient agreed to injection and tolerated well.  She can receive these injections every 3 months as needed.  Viscosupplementation gel injections could be of benefit in the future.  She should continue use of her cane.  She can take Advil or Tylenol as well as applied topical creams or use ice application to help decrease pain and inflammation.  She should avoid aggravating activities and can follow-up as symptoms dictate.

## 2024-03-09 RX ORDER — OMEPRAZOLE 40 MG/1
40 CAPSULE, DELAYED RELEASE ORAL DAILY
Qty: 90 CAPSULE | Refills: 0 | OUTPATIENT
Start: 2024-03-09

## 2024-04-17 ENCOUNTER — APPOINTMENT (OUTPATIENT)
Dept: PODIATRY | Facility: CLINIC | Age: 89
End: 2024-04-17
Payer: MEDICARE

## 2024-05-02 RX ORDER — OLOPATADINE HYDROCHLORIDE AND MOMETASONE FUROATE 25; 665 UG/1; UG/1
2 SPRAY, METERED NASAL 2 TIMES DAILY
COMMUNITY
End: 2024-06-07 | Stop reason: WASHOUT

## 2024-05-07 ENCOUNTER — LAB (OUTPATIENT)
Dept: LAB | Facility: LAB | Age: 89
End: 2024-05-07
Payer: MEDICARE

## 2024-05-07 DIAGNOSIS — G96.01 CRANIAL CEREBROSPINAL FLUID LEAK, SPONTANEOUS: Primary | ICD-10-CM

## 2024-05-07 PROCEDURE — 86334 IMMUNOFIX E-PHORESIS SERUM: CPT

## 2024-05-08 ENCOUNTER — OFFICE VISIT (OUTPATIENT)
Dept: NEUROLOGY | Facility: CLINIC | Age: 89
End: 2024-05-08
Payer: MEDICARE

## 2024-05-08 VITALS
BODY MASS INDEX: 30.24 KG/M2 | SYSTOLIC BLOOD PRESSURE: 129 MMHG | DIASTOLIC BLOOD PRESSURE: 76 MMHG | WEIGHT: 164.3 LBS | HEART RATE: 84 BPM | HEIGHT: 62 IN

## 2024-05-08 DIAGNOSIS — F03.90 DEMENTIA WITHOUT BEHAVIORAL DISTURBANCE, PSYCHOTIC DISTURBANCE, MOOD DISTURBANCE, OR ANXIETY, UNSPECIFIED DEMENTIA SEVERITY, UNSPECIFIED DEMENTIA TYPE (MULTI): Primary | ICD-10-CM

## 2024-05-08 PROCEDURE — 1159F MED LIST DOCD IN RCRD: CPT | Performed by: PSYCHIATRY & NEUROLOGY

## 2024-05-08 PROCEDURE — 1160F RVW MEDS BY RX/DR IN RCRD: CPT | Performed by: PSYCHIATRY & NEUROLOGY

## 2024-05-08 PROCEDURE — 99213 OFFICE O/P EST LOW 20 MIN: CPT | Performed by: PSYCHIATRY & NEUROLOGY

## 2024-05-08 PROCEDURE — 3078F DIAST BP <80 MM HG: CPT | Performed by: PSYCHIATRY & NEUROLOGY

## 2024-05-08 PROCEDURE — 3074F SYST BP LT 130 MM HG: CPT | Performed by: PSYCHIATRY & NEUROLOGY

## 2024-05-08 PROCEDURE — 1126F AMNT PAIN NOTED NONE PRSNT: CPT | Performed by: PSYCHIATRY & NEUROLOGY

## 2024-05-08 PROCEDURE — 1036F TOBACCO NON-USER: CPT | Performed by: PSYCHIATRY & NEUROLOGY

## 2024-05-08 RX ORDER — MEMANTINE HYDROCHLORIDE 10 MG/1
10 TABLET ORAL 2 TIMES DAILY
Qty: 180 TABLET | Refills: 3 | Status: SHIPPED | OUTPATIENT
Start: 2024-05-08 | End: 2025-05-08

## 2024-05-08 ASSESSMENT — PATIENT HEALTH QUESTIONNAIRE - PHQ9
2. FEELING DOWN, DEPRESSED OR HOPELESS: SEVERAL DAYS
SUM OF ALL RESPONSES TO PHQ9 QUESTIONS 1 AND 2: 2
1. LITTLE INTEREST OR PLEASURE IN DOING THINGS: SEVERAL DAYS

## 2024-05-08 ASSESSMENT — PAIN SCALES - GENERAL: PAINLEVEL: 0-NO PAIN

## 2024-05-08 ASSESSMENT — ENCOUNTER SYMPTOMS
DYSPHORIC MOOD: 1
CONFUSION: 1

## 2024-05-08 NOTE — PATIENT INSTRUCTIONS
Recommend to continue to focus on social interaction, memory exercises and physical exercise.  Follow up in 1 year.

## 2024-05-08 NOTE — PROGRESS NOTES
"Subjective   Jessy KALI Masterson is a 88 y.o. female who comes in for follow up of memory loss.      She is seeing her sisters fairly often.  They said that she is social and she remembers things from when she was a child. She is more social with them.  She is home alone most of the day.  She says she is not depressed.     If her daughter pushes her then she will exercise.  She is not eating very much although her weight is stable.  She does not eat much when she is alone.      She continues to take memantine twice a day.     She does sleep a lot. She snores some at night.     Review of Systems   Psychiatric/Behavioral:  Positive for confusion and dysphoric mood.    All other systems reviewed and are negative.      Objective   /76 (BP Location: Right arm, Patient Position: Sitting, BP Cuff Size: Adult)   Pulse 84   Ht 1.575 m (5' 2\")   Wt 74.5 kg (164 lb 4.8 oz)   BMI 30.05 kg/m²   Neurological Exam  Physical Exam    Alert, oriented to self and daughter. She has difficulty naming her grandkids, can get 3 our of 5.     Assessment/Plan   Ms. Masterson is a 88 year old woman presenting to the neurology clinic today for follow up of memory loss.  She does help with some activities of daily living however has to be asked to perform those activities.  Appears depressed however anti-depressants have not helped.  She had nightmares on Donepezil. Continue memantine for now.     Follow up in 1 year.    Savannah Mei, DO      "

## 2024-05-09 LAB — B2 TRANSFERRIN FLD QL: NOT DETECTED

## 2024-05-10 PROBLEM — R52 PAIN: Status: ACTIVE | Noted: 2019-10-21

## 2024-05-10 PROBLEM — L91.0 KELOID SCAR: Status: ACTIVE | Noted: 2019-11-14

## 2024-05-10 RX ORDER — OXYBUTYNIN CHLORIDE 5 MG/1
TABLET ORAL
COMMUNITY
End: 2024-06-07 | Stop reason: WASHOUT

## 2024-05-15 ENCOUNTER — OFFICE VISIT (OUTPATIENT)
Dept: PODIATRY | Facility: CLINIC | Age: 89
End: 2024-05-15
Payer: MEDICARE

## 2024-05-15 DIAGNOSIS — B35.3 TINEA PEDIS OF BOTH FEET: ICD-10-CM

## 2024-05-15 DIAGNOSIS — B35.1 ONYCHOMYCOSIS: Primary | ICD-10-CM

## 2024-05-15 PROCEDURE — 1159F MED LIST DOCD IN RCRD: CPT | Performed by: PODIATRIST

## 2024-05-15 PROCEDURE — 99213 OFFICE O/P EST LOW 20 MIN: CPT | Performed by: PODIATRIST

## 2024-05-15 PROCEDURE — 1036F TOBACCO NON-USER: CPT | Performed by: PODIATRIST

## 2024-05-15 PROCEDURE — 1160F RVW MEDS BY RX/DR IN RCRD: CPT | Performed by: PODIATRIST

## 2024-05-15 NOTE — PROGRESS NOTES
Chief Complaint   Patient presents with    Follow-up     Patient is here today for a follow up on fungal nail right hallux     HPI: Follow up fungal nail, no change noted.  Using penlac daily.    F/U tinea pedis, no itching.   Here with her daughter.    Phyiscal Exam  Patient alert, oriented, no acute distress    VASC: +2/4 pedal pulses B/L.  CFT brisk all digits.  Feet warm to touch.  Mild LE edema B/L and multiple varicosities.  Edema is nonpitting.  There is no weeping or bulla noted.    NEURO: Vibratory intact B/L.  Light touch intact B/L.     DERM:Nails 1R is thick, yellow, has subungual debris consistent with a superficial white fungal infection.  Nail is infected down to the proximal nail border.  No clear base noted. No cellulitis noted.  Mild amount dry scaly skin noted in the interspace 2 and 4 R foot.    MUSCULOSKEL: +5/5 muscle strength B/L.    Decreased ankle joint range of motion bilaterally.    Assessment and Plan  #1 Onychomycosis  Failed Penlac topical nail solution therapy  Discussed in detail with patient and daughter  Mild amount of fungal infection involving only 1 nail in an elderly patient, I do not recommend PO Lamisil secondary to possible risks  Patient and daughter are in agreement  Follow-up as needed    #2 Tinea pedis  improved  Continue with Ketoconazole cream apply this twice a day x 2 weeks  Until scaly skin resolves  Follow-up as needed

## 2024-06-07 ENCOUNTER — OFFICE VISIT (OUTPATIENT)
Dept: PRIMARY CARE | Facility: CLINIC | Age: 89
End: 2024-06-07
Payer: MEDICARE

## 2024-06-07 VITALS
DIASTOLIC BLOOD PRESSURE: 70 MMHG | SYSTOLIC BLOOD PRESSURE: 138 MMHG | HEIGHT: 61 IN | WEIGHT: 167.3 LBS | BODY MASS INDEX: 31.59 KG/M2 | RESPIRATION RATE: 18 BRPM | HEART RATE: 92 BPM

## 2024-06-07 DIAGNOSIS — Z00.00 MEDICARE ANNUAL WELLNESS VISIT, SUBSEQUENT: Primary | ICD-10-CM

## 2024-06-07 DIAGNOSIS — R23.4 CRACKED SKIN ON FEET: ICD-10-CM

## 2024-06-07 DIAGNOSIS — F03.B0 MODERATE DEMENTIA, UNSPECIFIED DEMENTIA TYPE, UNSPECIFIED WHETHER BEHAVIORAL, PSYCHOTIC, OR MOOD DISTURBANCE OR ANXIETY (MULTI): ICD-10-CM

## 2024-06-07 DIAGNOSIS — E66.09 CLASS 1 OBESITY DUE TO EXCESS CALORIES WITH SERIOUS COMORBIDITY AND BODY MASS INDEX (BMI) OF 32.0 TO 32.9 IN ADULT: ICD-10-CM

## 2024-06-07 DIAGNOSIS — F33.9 DEPRESSION, RECURRENT (CMS-HCC): ICD-10-CM

## 2024-06-07 DIAGNOSIS — N32.81 OVERACTIVE BLADDER: ICD-10-CM

## 2024-06-07 DIAGNOSIS — H40.1194 PRIMARY OPEN-ANGLE GLAUCOMA, INDETERMINATE STAGE, UNSPECIFIED LATERALITY: ICD-10-CM

## 2024-06-07 DIAGNOSIS — Z00.00 ROUTINE GENERAL MEDICAL EXAMINATION AT A HEALTH CARE FACILITY: ICD-10-CM

## 2024-06-07 DIAGNOSIS — I10 PRIMARY HYPERTENSION: ICD-10-CM

## 2024-06-07 PROBLEM — E66.811 CLASS 1 OBESITY DUE TO EXCESS CALORIES WITH SERIOUS COMORBIDITY AND BODY MASS INDEX (BMI) OF 32.0 TO 32.9 IN ADULT: Status: ACTIVE | Noted: 2024-06-07

## 2024-06-07 LAB
ALBUMIN SERPL BCP-MCNC: 4.2 G/DL (ref 3.4–5)
ALP SERPL-CCNC: 62 U/L (ref 33–136)
ALT SERPL W P-5'-P-CCNC: 11 U/L (ref 7–45)
ANION GAP SERPL CALC-SCNC: 14 MMOL/L (ref 10–20)
AST SERPL W P-5'-P-CCNC: 20 U/L (ref 9–39)
BILIRUB SERPL-MCNC: 0.6 MG/DL (ref 0–1.2)
BUN SERPL-MCNC: 27 MG/DL (ref 6–23)
CALCIUM SERPL-MCNC: 9.3 MG/DL (ref 8.6–10.6)
CHLORIDE SERPL-SCNC: 102 MMOL/L (ref 98–107)
CO2 SERPL-SCNC: 31 MMOL/L (ref 21–32)
CREAT SERPL-MCNC: 0.93 MG/DL (ref 0.5–1.05)
EGFRCR SERPLBLD CKD-EPI 2021: 60 ML/MIN/1.73M*2
ERYTHROCYTE [DISTWIDTH] IN BLOOD BY AUTOMATED COUNT: 13.7 % (ref 11.5–14.5)
GLUCOSE SERPL-MCNC: 75 MG/DL (ref 74–99)
HCT VFR BLD AUTO: 44.6 % (ref 36–46)
HGB BLD-MCNC: 14.4 G/DL (ref 12–16)
MCH RBC QN AUTO: 32.6 PG (ref 26–34)
MCHC RBC AUTO-ENTMCNC: 32.3 G/DL (ref 32–36)
MCV RBC AUTO: 101 FL (ref 80–100)
NRBC BLD-RTO: 0 /100 WBCS (ref 0–0)
PLATELET # BLD AUTO: 307 X10*3/UL (ref 150–450)
POTASSIUM SERPL-SCNC: 4.2 MMOL/L (ref 3.5–5.3)
PROT SERPL-MCNC: 6.8 G/DL (ref 6.4–8.2)
RBC # BLD AUTO: 4.42 X10*6/UL (ref 4–5.2)
SODIUM SERPL-SCNC: 143 MMOL/L (ref 136–145)
WBC # BLD AUTO: 6.5 X10*3/UL (ref 4.4–11.3)

## 2024-06-07 PROCEDURE — 99397 PER PM REEVAL EST PAT 65+ YR: CPT | Performed by: INTERNAL MEDICINE

## 2024-06-07 PROCEDURE — 1170F FXNL STATUS ASSESSED: CPT | Performed by: INTERNAL MEDICINE

## 2024-06-07 PROCEDURE — 80053 COMPREHEN METABOLIC PANEL: CPT

## 2024-06-07 PROCEDURE — 1036F TOBACCO NON-USER: CPT | Performed by: INTERNAL MEDICINE

## 2024-06-07 PROCEDURE — 85027 COMPLETE CBC AUTOMATED: CPT

## 2024-06-07 PROCEDURE — 82746 ASSAY OF FOLIC ACID SERUM: CPT

## 2024-06-07 PROCEDURE — 1123F ACP DISCUSS/DSCN MKR DOCD: CPT | Performed by: INTERNAL MEDICINE

## 2024-06-07 PROCEDURE — 82607 VITAMIN B-12: CPT

## 2024-06-07 PROCEDURE — 3078F DIAST BP <80 MM HG: CPT | Performed by: INTERNAL MEDICINE

## 2024-06-07 PROCEDURE — 1160F RVW MEDS BY RX/DR IN RCRD: CPT | Performed by: INTERNAL MEDICINE

## 2024-06-07 PROCEDURE — 1158F ADVNC CARE PLAN TLK DOCD: CPT | Performed by: INTERNAL MEDICINE

## 2024-06-07 PROCEDURE — 3075F SYST BP GE 130 - 139MM HG: CPT | Performed by: INTERNAL MEDICINE

## 2024-06-07 PROCEDURE — G0439 PPPS, SUBSEQ VISIT: HCPCS | Performed by: INTERNAL MEDICINE

## 2024-06-07 PROCEDURE — 1159F MED LIST DOCD IN RCRD: CPT | Performed by: INTERNAL MEDICINE

## 2024-06-07 PROCEDURE — 36415 COLL VENOUS BLD VENIPUNCTURE: CPT

## 2024-06-07 RX ORDER — MONTELUKAST SODIUM 10 MG/1
10 TABLET ORAL NIGHTLY
COMMUNITY
Start: 2024-05-17

## 2024-06-07 ASSESSMENT — ENCOUNTER SYMPTOMS
WOUND: 0
POLYPHAGIA: 0
FLANK PAIN: 0
ACTIVITY CHANGE: 0
BRUISES/BLEEDS EASILY: 0
DECREASED CONCENTRATION: 0
SINUS PRESSURE: 0
DIFFICULTY URINATING: 0
FREQUENCY: 1
EYE REDNESS: 0
JOINT SWELLING: 0
HALLUCINATIONS: 0
NECK PAIN: 0
NERVOUS/ANXIOUS: 0
POLYDIPSIA: 0
STRIDOR: 0
SORE THROAT: 0
FACIAL SWELLING: 0
SPEECH DIFFICULTY: 0
LIGHT-HEADEDNESS: 0
DIAPHORESIS: 0
HEMATURIA: 0
PHOTOPHOBIA: 0
ARTHRALGIAS: 0
COUGH: 0
WHEEZING: 0
FEVER: 0
ABDOMINAL DISTENTION: 0
NECK STIFFNESS: 0
MYALGIAS: 0
EYE DISCHARGE: 0
ANAL BLEEDING: 0
BLOOD IN STOOL: 0
VOMITING: 0
BACK PAIN: 1
COLOR CHANGE: 0
CONFUSION: 1
TROUBLE SWALLOWING: 0
APPETITE CHANGE: 0
CHOKING: 0
ADENOPATHY: 0
CHILLS: 0
VOICE CHANGE: 0
NUMBNESS: 0
SHORTNESS OF BREATH: 0
CHEST TIGHTNESS: 0
APNEA: 0
FATIGUE: 0
SINUS PAIN: 0
RHINORRHEA: 0
SEIZURES: 0
SLEEP DISTURBANCE: 0
DYSPHORIC MOOD: 0
HEADACHES: 0
HYPERACTIVE: 0
CONSTIPATION: 0
UNEXPECTED WEIGHT CHANGE: 0
DIZZINESS: 0
AGITATION: 0
ABDOMINAL PAIN: 0
DYSURIA: 0
DIARRHEA: 0
PALPITATIONS: 0
TREMORS: 0
WEAKNESS: 0
EYE PAIN: 0
EYE ITCHING: 0
RECTAL PAIN: 0
NAUSEA: 0

## 2024-06-07 ASSESSMENT — PATIENT HEALTH QUESTIONNAIRE - PHQ9
1. LITTLE INTEREST OR PLEASURE IN DOING THINGS: NOT AT ALL
2. FEELING DOWN, DEPRESSED OR HOPELESS: NOT AT ALL
SUM OF ALL RESPONSES TO PHQ9 QUESTIONS 1 AND 2: 0

## 2024-06-07 ASSESSMENT — ACTIVITIES OF DAILY LIVING (ADL)
MANAGING_FINANCES: TOTAL CARE
TAKING_MEDICATION: TOTAL CARE
GROCERY_SHOPPING: TOTAL CARE
BATHING: INDEPENDENT
DOING_HOUSEWORK: TOTAL CARE
DRESSING: INDEPENDENT

## 2024-06-07 NOTE — PROGRESS NOTES
Subjective   Reason for Visit: Jessy Masterson is an 88 y.o. female here for a Medicare Wellness visit.     Past Medical, Surgical, and Family History reviewed and updated in chart.    Reviewed all medications by prescribing practitioner or clinical pharmacist (such as prescriptions, OTCs, herbal therapies and supplements) and documented in the medical record.    HPI  Pt here for MWV.  She is with her daughter today who provides the history (language barrier).  She has a living will but not on file.  Her daughter and son are her DPOA's.      She is seeing podiatry for food/nail care.  She had a visit in mid May.      She sees neurology (Dr. Mei) for dementia treatment/evaluation.  She had visit May 8th.  She is on Memantine for now.  She did have side effects from Donepezil.  She will follow up in 1 year.  She is quite but doesn't feel depressed-per daughter this is her personality.      She continues to follow with urogyn for bladder issues/vaginal atrophy.  Her daughter tells me she still goes frequently but thinks its more her mothers habit.      She had cortisol injection into her right knee but per daughter hasn't helped much.      She is not doing any screening measures anymore.      Patient Care Team:  Angelita CHAPMAN DO as PCP - General     Review of Systems   Constitutional:  Negative for activity change, appetite change, chills, diaphoresis, fatigue, fever and unexpected weight change.   HENT:  Positive for hearing loss and postnasal drip. Negative for congestion, dental problem, drooling, ear discharge, ear pain, facial swelling, mouth sores, nosebleeds, rhinorrhea, sinus pressure, sinus pain, sneezing, sore throat, tinnitus, trouble swallowing and voice change.         Clears throat    Eyes:  Negative for photophobia, pain, discharge, redness, itching and visual disturbance (wears glasses-up to date on exam).   Respiratory:  Negative for apnea, cough, choking, chest tightness, shortness of  "breath, wheezing and stridor.    Cardiovascular:  Negative for chest pain, palpitations and leg swelling.   Gastrointestinal:  Negative for abdominal distention, abdominal pain, anal bleeding, blood in stool, constipation, diarrhea, nausea, rectal pain and vomiting.   Endocrine: Negative for cold intolerance, heat intolerance, polydipsia, polyphagia and polyuria.   Genitourinary:  Positive for frequency. Negative for decreased urine volume, difficulty urinating, dyspareunia, dysuria, enuresis, flank pain, genital sores, hematuria, menstrual problem, pelvic pain, urgency, vaginal bleeding, vaginal discharge and vaginal pain.   Musculoskeletal:  Positive for back pain. Negative for arthralgias, gait problem, joint swelling, myalgias, neck pain and neck stiffness.   Skin:  Negative for color change, pallor, rash and wound.   Allergic/Immunologic: Negative for environmental allergies, food allergies and immunocompromised state.   Neurological:  Negative for dizziness, tremors, seizures, syncope, speech difficulty, weakness, light-headedness, numbness and headaches.   Hematological:  Negative for adenopathy. Does not bruise/bleed easily.   Psychiatric/Behavioral:  Positive for confusion. Negative for agitation, behavioral problems, decreased concentration, dysphoric mood, hallucinations, self-injury, sleep disturbance and suicidal ideas. The patient is not nervous/anxious and is not hyperactive.        Objective   Vitals:  /70 (BP Location: Left arm, Patient Position: Sitting)   Pulse 92   Resp 18   Ht 1.537 m (5' 0.5\")   Wt 75.9 kg (167 lb 4.8 oz)   BMI 32.14 kg/m²       Physical Exam  Constitutional:       Appearance: Normal appearance. She is obese.   HENT:      Head: Normocephalic and atraumatic.      Right Ear: Tympanic membrane, ear canal and external ear normal. There is no impacted cerumen.      Left Ear: Tympanic membrane, ear canal and external ear normal. There is no impacted cerumen.      Nose: " Nose normal. No congestion or rhinorrhea.      Mouth/Throat:      Mouth: Mucous membranes are moist.      Pharynx: Oropharynx is clear. No oropharyngeal exudate or posterior oropharyngeal erythema.   Eyes:      Extraocular Movements: Extraocular movements intact.      Conjunctiva/sclera: Conjunctivae normal.      Pupils: Pupils are equal, round, and reactive to light.   Neck:      Vascular: No carotid bruit.   Cardiovascular:      Rate and Rhythm: Normal rate and regular rhythm.      Pulses: Normal pulses.      Heart sounds: Normal heart sounds. No murmur heard.  Pulmonary:      Effort: Pulmonary effort is normal. No respiratory distress.      Breath sounds: Normal breath sounds. No wheezing, rhonchi or rales.   Abdominal:      General: Abdomen is flat. Bowel sounds are normal. There is no distension.      Palpations: Abdomen is soft.      Tenderness: There is no abdominal tenderness.      Hernia: No hernia is present.   Musculoskeletal:         General: No swelling or tenderness. Normal range of motion.      Cervical back: Normal range of motion and neck supple.      Right lower leg: No edema.      Left lower leg: No edema.   Lymphadenopathy:      Cervical: No cervical adenopathy.   Skin:     General: Skin is warm and dry.      Findings: No lesion or rash.   Neurological:      General: No focal deficit present.      Mental Status: She is alert and oriented to person, place, and time.      Cranial Nerves: No cranial nerve deficit.      Sensory: No sensory deficit.      Motor: No weakness.   Psychiatric:         Mood and Affect: Mood normal.         Behavior: Behavior normal.         Thought Content: Thought content normal.         Judgment: Judgment normal.         Assessment/Plan   Problem List Items Addressed This Visit       Dementia (Multi)    Current Assessment & Plan     Sees neuro  On Memantine with stabilization of memory per daughter   No behavioral issues noted at this time          Relevant Orders     Vitamin B12    Folate    Follow Up In Primary Care - Established    HTN (hypertension)    Current Assessment & Plan     Adequate control on current meds          Relevant Orders    Comprehensive Metabolic Panel    CBC    Follow Up In Primary Care - Established    Overactive bladder    Current Assessment & Plan     Continues to have frequency but had bladder sling last year          Glaucoma    Current Assessment & Plan     On eye drops/sees eye doc          Depression, recurrent (CMS-HCC)    Current Assessment & Plan     Hasn't done well on meds in the past  No behavioral issues but she is not social per daughter          Class 1 obesity due to excess calories with serious comorbidity and body mass index (BMI) of 32.0 to 32.9 in adult     Other Visit Diagnoses       Medicare annual wellness visit, subsequent    -  Primary    Cracked skin on feet        Routine general medical examination at a health care facility                    4 = No assist / stand by assistance

## 2024-06-07 NOTE — ASSESSMENT & PLAN NOTE
Sees neuro  On Memantine with stabilization of memory per daughter   No behavioral issues noted at this time

## 2024-06-07 NOTE — PATIENT INSTRUCTIONS
Continue all medications as directed-call when refills needed  Continue to help orient patient as able-visual ques if necessary such as dry erase board with day, date, season, time of day but do not argue or correct patient if getting upset  Continue to see specialist as directed  We did small amount of blood work to make sure kidney/liver/blood counts and vitamin levels are ok  Do recommend Flu/Covid/RSV boosters come fall   Continue to adhere to well balanced diet and being as active as able   Follow up here in 6 months

## 2024-06-08 LAB
FOLATE SERPL-MCNC: 15.5 NG/ML
VIT B12 SERPL-MCNC: 245 PG/ML (ref 211–911)

## 2024-06-11 ENCOUNTER — TELEPHONE (OUTPATIENT)
Dept: PRIMARY CARE | Facility: CLINIC | Age: 89
End: 2024-06-11
Payer: MEDICARE

## 2024-06-11 NOTE — TELEPHONE ENCOUNTER
----- Message from Angelita CHAPMAN DO sent at 6/10/2024  4:10 PM EDT -----  Calcium 9063-5257 mg/day and vitamin D3 2000 units/day   ----- Message -----  From: Leonie Moser LPN  Sent: 6/10/2024   4:02 PM EDT  To: Angelita Uriarte V, DO    Are there any other vitamins she should be taking besides the B12?  ----- Message -----  From: Angelita CHAPMAN DO  Sent: 6/10/2024   7:53 AM EDT  To:  [id number] [place] [id number] Clinical Support Staff    Her B12 is a bit low-recommend taking otc vitamin B12 1000 mcg/day to help elevate this   Otherwise all labs were normal

## 2024-09-03 DIAGNOSIS — I10 HYPERTENSION, UNSPECIFIED TYPE: ICD-10-CM

## 2024-09-03 RX ORDER — AMLODIPINE BESYLATE 5 MG/1
5 TABLET ORAL DAILY
Qty: 90 TABLET | Refills: 1 | Status: SHIPPED | OUTPATIENT
Start: 2024-09-03

## 2024-11-27 DIAGNOSIS — I10 HYPERTENSION, UNSPECIFIED TYPE: Primary | ICD-10-CM

## 2024-11-27 RX ORDER — LISINOPRIL 40 MG/1
40 TABLET ORAL DAILY
Qty: 90 TABLET | Refills: 3 | Status: SHIPPED | OUTPATIENT
Start: 2024-11-27

## 2024-12-12 ENCOUNTER — APPOINTMENT (OUTPATIENT)
Dept: PRIMARY CARE | Facility: CLINIC | Age: 89
End: 2024-12-12
Payer: MEDICARE

## 2024-12-12 VITALS
RESPIRATION RATE: 16 BRPM | HEART RATE: 92 BPM | WEIGHT: 172.3 LBS | OXYGEN SATURATION: 96 % | SYSTOLIC BLOOD PRESSURE: 138 MMHG | BODY MASS INDEX: 33.1 KG/M2 | DIASTOLIC BLOOD PRESSURE: 81 MMHG

## 2024-12-12 DIAGNOSIS — N32.81 OVERACTIVE BLADDER: ICD-10-CM

## 2024-12-12 DIAGNOSIS — F03.B0 MODERATE DEMENTIA, UNSPECIFIED DEMENTIA TYPE, UNSPECIFIED WHETHER BEHAVIORAL, PSYCHOTIC, OR MOOD DISTURBANCE OR ANXIETY: ICD-10-CM

## 2024-12-12 DIAGNOSIS — I10 PRIMARY HYPERTENSION: Primary | ICD-10-CM

## 2024-12-12 PROBLEM — R52 PAIN: Status: RESOLVED | Noted: 2019-10-21 | Resolved: 2024-12-12

## 2024-12-12 PROBLEM — R29.6 MULTIPLE FALLS: Status: RESOLVED | Noted: 2023-11-09 | Resolved: 2024-12-12

## 2024-12-12 PROCEDURE — 99214 OFFICE O/P EST MOD 30 MIN: CPT | Performed by: INTERNAL MEDICINE

## 2024-12-12 PROCEDURE — 3075F SYST BP GE 130 - 139MM HG: CPT | Performed by: INTERNAL MEDICINE

## 2024-12-12 PROCEDURE — 3079F DIAST BP 80-89 MM HG: CPT | Performed by: INTERNAL MEDICINE

## 2024-12-12 PROCEDURE — 1159F MED LIST DOCD IN RCRD: CPT | Performed by: INTERNAL MEDICINE

## 2024-12-12 PROCEDURE — 1160F RVW MEDS BY RX/DR IN RCRD: CPT | Performed by: INTERNAL MEDICINE

## 2024-12-12 PROCEDURE — 1123F ACP DISCUSS/DSCN MKR DOCD: CPT | Performed by: INTERNAL MEDICINE

## 2024-12-12 ASSESSMENT — ENCOUNTER SYMPTOMS
PALPITATIONS: 0
FREQUENCY: 1
DECREASED CONCENTRATION: 0
DIZZINESS: 0
DIARRHEA: 0
UNEXPECTED WEIGHT CHANGE: 0
NAUSEA: 0
FEVER: 0
CONSTIPATION: 0
CONFUSION: 1
SHORTNESS OF BREATH: 0
DYSPHORIC MOOD: 0
LIGHT-HEADEDNESS: 0
ARTHRALGIAS: 0
WHEEZING: 0
ACTIVITY CHANGE: 0
VOMITING: 0
HEADACHES: 0
DIFFICULTY URINATING: 0
APPETITE CHANGE: 1
SLEEP DISTURBANCE: 0
FATIGUE: 0
ABDOMINAL PAIN: 0
BACK PAIN: 0
NERVOUS/ANXIOUS: 0
CHILLS: 0
CHEST TIGHTNESS: 0
COUGH: 0

## 2024-12-12 ASSESSMENT — PATIENT HEALTH QUESTIONNAIRE - PHQ9
1. LITTLE INTEREST OR PLEASURE IN DOING THINGS: SEVERAL DAYS
SUM OF ALL RESPONSES TO PHQ9 QUESTIONS 1 AND 2: 2
2. FEELING DOWN, DEPRESSED OR HOPELESS: SEVERAL DAYS

## 2024-12-12 NOTE — PROGRESS NOTES
Subjective   Patient ID: Jessy Masterson is a 89 y.o. female who presents for Follow-up (6 month ).    HPI  Pt here in follow up.  She is with her daughter.  Her daughter tells me her mother doesn't eat much yet she is up in weight.      She continues to have urinary issues but had the urinary sling.    No bowel issues per daughter/pt.     She is on BP meds and her pressures are stable.      Daughter feels her mother is very absent minded-will stand up from couch and stand there not seeming to know what to do.  She is asking if she can stop the Namenda.  Daughter started her on a mushroom supplement.  She did see Dr. Arambula.      Review of Systems   Constitutional:  Positive for appetite change. Negative for activity change, chills, fatigue, fever and unexpected weight change.   Respiratory:  Negative for cough, chest tightness, shortness of breath and wheezing.    Cardiovascular:  Negative for chest pain, palpitations and leg swelling.   Gastrointestinal:  Negative for abdominal pain, constipation, diarrhea, nausea and vomiting.   Genitourinary:  Positive for frequency. Negative for difficulty urinating.   Musculoskeletal:  Negative for arthralgias and back pain.   Neurological:  Negative for dizziness, light-headedness and headaches.   Psychiatric/Behavioral:  Positive for confusion. Negative for decreased concentration, dysphoric mood and sleep disturbance. The patient is not nervous/anxious.        Objective   /81 (BP Location: Left arm, Patient Position: Sitting)   Pulse 92   Resp 16   Wt 78.2 kg (172 lb 4.8 oz)   SpO2 96%   BMI 33.10 kg/m²      Physical Exam  Constitutional:       Appearance: Normal appearance.   Cardiovascular:      Rate and Rhythm: Normal rate and regular rhythm.      Heart sounds: Normal heart sounds.   Pulmonary:      Effort: Pulmonary effort is normal.      Breath sounds: Normal breath sounds.   Abdominal:      General: Bowel sounds are normal.      Palpations: Abdomen  is soft.   Musculoskeletal:      Right lower leg: No edema.      Left lower leg: No edema.   Lymphadenopathy:      Cervical: No cervical adenopathy.   Neurological:      General: No focal deficit present.      Mental Status: She is alert. Mental status is at baseline.      Comments: Pt minimally verbal but answers correctly and is able to follow commands   Psychiatric:         Mood and Affect: Mood normal.         Assessment/Plan   Problem List Items Addressed This Visit       Dementia     Pt saw neuro  Started on Namenda  Daughter doesn't see much change and is considering stopping medication once she runs out of current pills  This seems reasonable if not seeing slowing of progression          Relevant Orders    Follow Up In Primary Care - Medicare Annual    HTN (hypertension) - Primary     BP adequately controlled          Relevant Orders    Follow Up In Primary Care - Medicare Annual    Overactive bladder     Had sling procedure but still has urinary frequency

## 2024-12-12 NOTE — PATIENT INSTRUCTIONS
Continue medications as directed-call when refills needed  If you want to stop the memantine that is ok if you are not seeing any improvements  If not getting enough protein can supplement with ensure/boost if needed  Stay as active as able   Follow up here in 6 months-sooner if needed

## 2024-12-12 NOTE — ASSESSMENT & PLAN NOTE
Pt saw neuro  Started on Namenda  Daughter doesn't see much change and is considering stopping medication once she runs out of current pills  This seems reasonable if not seeing slowing of progression

## 2025-01-07 ENCOUNTER — APPOINTMENT (OUTPATIENT)
Dept: URGENT CARE | Age: 89
End: 2025-01-07
Payer: MEDICARE

## 2025-03-19 DIAGNOSIS — I10 HYPERTENSION, UNSPECIFIED TYPE: ICD-10-CM

## 2025-03-20 RX ORDER — AMLODIPINE BESYLATE 5 MG/1
5 TABLET ORAL DAILY
Qty: 90 TABLET | Refills: 1 | Status: SHIPPED | OUTPATIENT
Start: 2025-03-20

## 2025-05-08 ENCOUNTER — APPOINTMENT (OUTPATIENT)
Dept: NEUROLOGY | Facility: CLINIC | Age: 89
End: 2025-05-08
Payer: MEDICARE

## 2025-05-23 ENCOUNTER — TELEPHONE (OUTPATIENT)
Dept: PRIMARY CARE | Facility: CLINIC | Age: 89
End: 2025-05-23
Payer: MEDICARE

## 2025-05-23 DIAGNOSIS — F01.A0 MILD VASCULAR DEMENTIA WITHOUT BEHAVIORAL DISTURBANCE, PSYCHOTIC DISTURBANCE, MOOD DISTURBANCE, OR ANXIETY: Primary | ICD-10-CM

## 2025-06-24 ENCOUNTER — APPOINTMENT (OUTPATIENT)
Dept: PRIMARY CARE | Facility: CLINIC | Age: 89
End: 2025-06-24
Payer: MEDICARE

## 2025-06-24 VITALS
DIASTOLIC BLOOD PRESSURE: 72 MMHG | BODY MASS INDEX: 31.53 KG/M2 | SYSTOLIC BLOOD PRESSURE: 108 MMHG | OXYGEN SATURATION: 95 % | HEART RATE: 83 BPM | WEIGHT: 167 LBS | HEIGHT: 61 IN

## 2025-06-24 DIAGNOSIS — E53.8 LOW SERUM VITAMIN B12: ICD-10-CM

## 2025-06-24 DIAGNOSIS — I10 PRIMARY HYPERTENSION: ICD-10-CM

## 2025-06-24 DIAGNOSIS — E66.811 CLASS 1 OBESITY DUE TO EXCESS CALORIES WITH SERIOUS COMORBIDITY AND BODY MASS INDEX (BMI) OF 32.0 TO 32.9 IN ADULT: ICD-10-CM

## 2025-06-24 DIAGNOSIS — H40.1111 PRIMARY OPEN ANGLE GLAUCOMA (POAG) OF RIGHT EYE, MILD STAGE: ICD-10-CM

## 2025-06-24 DIAGNOSIS — F03.B0 MODERATE DEMENTIA, UNSPECIFIED DEMENTIA TYPE, UNSPECIFIED WHETHER BEHAVIORAL, PSYCHOTIC, OR MOOD DISTURBANCE OR ANXIETY: ICD-10-CM

## 2025-06-24 DIAGNOSIS — Z00.00 MEDICARE ANNUAL WELLNESS VISIT, SUBSEQUENT: Primary | ICD-10-CM

## 2025-06-24 DIAGNOSIS — Z00.00 ROUTINE GENERAL MEDICAL EXAMINATION AT A HEALTH CARE FACILITY: ICD-10-CM

## 2025-06-24 DIAGNOSIS — E66.09 CLASS 1 OBESITY DUE TO EXCESS CALORIES WITH SERIOUS COMORBIDITY AND BODY MASS INDEX (BMI) OF 32.0 TO 32.9 IN ADULT: ICD-10-CM

## 2025-06-24 PROCEDURE — 1170F FXNL STATUS ASSESSED: CPT | Performed by: INTERNAL MEDICINE

## 2025-06-24 PROCEDURE — 3078F DIAST BP <80 MM HG: CPT | Performed by: INTERNAL MEDICINE

## 2025-06-24 PROCEDURE — G0439 PPPS, SUBSEQ VISIT: HCPCS | Performed by: INTERNAL MEDICINE

## 2025-06-24 PROCEDURE — 1159F MED LIST DOCD IN RCRD: CPT | Performed by: INTERNAL MEDICINE

## 2025-06-24 PROCEDURE — 1036F TOBACCO NON-USER: CPT | Performed by: INTERNAL MEDICINE

## 2025-06-24 PROCEDURE — 1158F ADVNC CARE PLAN TLK DOCD: CPT | Performed by: INTERNAL MEDICINE

## 2025-06-24 PROCEDURE — 3074F SYST BP LT 130 MM HG: CPT | Performed by: INTERNAL MEDICINE

## 2025-06-24 PROCEDURE — 1123F ACP DISCUSS/DSCN MKR DOCD: CPT | Performed by: INTERNAL MEDICINE

## 2025-06-24 PROCEDURE — 1160F RVW MEDS BY RX/DR IN RCRD: CPT | Performed by: INTERNAL MEDICINE

## 2025-06-24 PROCEDURE — 99397 PER PM REEVAL EST PAT 65+ YR: CPT | Performed by: INTERNAL MEDICINE

## 2025-06-24 ASSESSMENT — ENCOUNTER SYMPTOMS
NAUSEA: 0
EYE ITCHING: 0
CONFUSION: 1
DYSPHORIC MOOD: 0
SEIZURES: 0
CHOKING: 0
CHILLS: 0
FATIGUE: 0
SINUS PRESSURE: 0
TREMORS: 0
NECK PAIN: 0
SHORTNESS OF BREATH: 0
WEAKNESS: 0
SPEECH DIFFICULTY: 0
SLEEP DISTURBANCE: 0
EYE REDNESS: 0
DIZZINESS: 0
APNEA: 0
APPETITE CHANGE: 0
MYALGIAS: 0
FLANK PAIN: 0
VOMITING: 0
PALPITATIONS: 0
RHINORRHEA: 0
PHOTOPHOBIA: 0
ARTHRALGIAS: 0
WHEEZING: 0
ANAL BLEEDING: 0
CONSTIPATION: 0
BRUISES/BLEEDS EASILY: 0
SORE THROAT: 0
UNEXPECTED WEIGHT CHANGE: 0
WOUND: 0
ABDOMINAL PAIN: 0
AGITATION: 0
VOICE CHANGE: 0
CHEST TIGHTNESS: 0
BLOOD IN STOOL: 0
DIFFICULTY URINATING: 0
FACIAL ASYMMETRY: 0
BACK PAIN: 0
DIARRHEA: 0
FREQUENCY: 0
STRIDOR: 0
FEVER: 0
DYSURIA: 0
DECREASED CONCENTRATION: 0
ADENOPATHY: 0
NUMBNESS: 0
EYE PAIN: 0
HYPERACTIVE: 0
TROUBLE SWALLOWING: 0
POLYDIPSIA: 0
EYE DISCHARGE: 0
POLYPHAGIA: 0
COLOR CHANGE: 0
NECK STIFFNESS: 0
HEMATURIA: 0
SINUS PAIN: 0
DIAPHORESIS: 0
JOINT SWELLING: 0
RECTAL PAIN: 0
ABDOMINAL DISTENTION: 0
ACTIVITY CHANGE: 0
FACIAL SWELLING: 0
HEADACHES: 0
NERVOUS/ANXIOUS: 0
LIGHT-HEADEDNESS: 0
HALLUCINATIONS: 0
COUGH: 0

## 2025-06-24 ASSESSMENT — ACTIVITIES OF DAILY LIVING (ADL)
GROCERY_SHOPPING: NEEDS ASSISTANCE
BATHING: INDEPENDENT
DRESSING: INDEPENDENT
TAKING_MEDICATION: NEEDS ASSISTANCE
DOING_HOUSEWORK: INDEPENDENT
MANAGING_FINANCES: TOTAL CARE

## 2025-06-24 ASSESSMENT — PATIENT HEALTH QUESTIONNAIRE - PHQ9
2. FEELING DOWN, DEPRESSED OR HOPELESS: NOT AT ALL
SUM OF ALL RESPONSES TO PHQ9 QUESTIONS 1 AND 2: 0
1. LITTLE INTEREST OR PLEASURE IN DOING THINGS: NOT AT ALL

## 2025-06-24 NOTE — ASSESSMENT & PLAN NOTE
BP low here  Will stop Amlodipine for now; continue Lisinopril  Monitor BP at home with goal of <140/90 consistently    Orders:    Follow Up In Primary Care - Medicare Annual    Comprehensive Metabolic Panel; Future    CBC; Future    Urinalysis with Reflex Microscopic; Future    Follow Up In Primary Care - Established; Future

## 2025-06-24 NOTE — ASSESSMENT & PLAN NOTE
Pt's daughter is not sure her mother has dementia as bad as originally thought  Did offer her lab testing [id number] marker that we can test in blood work (she will see if covered and let us know)  Stopped namenda because she felt made pt worse  Encouraged daughter to look for polish clubs/[employer] events were mom can be more social which would help with memory/cognition/motivation    Orders:    Follow Up In Primary Care - Medicare Annual    Follow Up In Primary Care - Established; Future

## 2025-06-24 NOTE — PROGRESS NOTES
Subjective   Reason for Visit: Jessy Masterson is an 89 y.o. female here for a Medicare Wellness visit.               HPI  Pt here for MWV.  She is with daughter today.  She is napping more-per daughter she tries to wake her after 1 hour of sleeping on couch.  Her weight is stable-similar to last year.      She had too many side effects from Namenda so daughter stopped it.  She did see Dr. Arambula but per daughter felt more spaced out.  Per daughter she has only minor memory changes at time-repeating things sometimes but not always.  She understands devices in house and how to use.  She still uses telephone appropriately.  She is taking a mushroom supplement.      Her BP is very low/good.  Per patient daughter at home she is getting readings high of 130 and that is when she is not taking medication.      She continues on eye drops for glaucoma.  She is up to date on exam-had one in March.      Patient Care Team:  Angelita CHAPMAN DO as PCP - General  Angelita CHAPMAN DO as PCP - [employer] PCP     Review of Systems   Constitutional:  Negative for activity change, appetite change, chills, diaphoresis, fatigue, fever and unexpected weight change.   HENT:  Negative for congestion, dental problem, drooling, ear discharge, ear pain, facial swelling, hearing loss, mouth sores, nosebleeds, postnasal drip, rhinorrhea, sinus pressure, sinus pain, sneezing, sore throat, tinnitus, trouble swallowing and voice change.    Eyes:  Negative for photophobia, pain, discharge, redness, itching and visual disturbance.   Respiratory:  Negative for apnea, cough, choking, chest tightness, shortness of breath, wheezing and stridor.    Cardiovascular:  Negative for chest pain, palpitations and leg swelling.   Gastrointestinal:  Negative for abdominal distention, abdominal pain, anal bleeding, blood in stool, constipation, diarrhea, nausea, rectal pain and vomiting.   Endocrine: Negative for cold intolerance, heat  "intolerance, polydipsia, polyphagia and polyuria.   Genitourinary:  Negative for decreased urine volume, difficulty urinating, dyspareunia, dysuria, enuresis, flank pain, frequency, genital sores, hematuria, menstrual problem, pelvic pain, urgency, vaginal bleeding, vaginal discharge and vaginal pain.   Musculoskeletal:  Negative for arthralgias, back pain, gait problem, joint swelling, myalgias, neck pain and neck stiffness.   Skin:  Negative for color change, pallor, rash and wound.   Allergic/Immunologic: Negative for environmental allergies, food allergies and immunocompromised state.   Neurological:  Negative for dizziness, tremors, seizures, syncope, facial asymmetry, speech difficulty, weakness, light-headedness, numbness and headaches.   Hematological:  Negative for adenopathy. Does not bruise/bleed easily.   Psychiatric/Behavioral:  Positive for confusion. Negative for agitation, behavioral problems, decreased concentration, dysphoric mood, hallucinations, self-injury, sleep disturbance and suicidal ideas. The patient is not nervous/anxious and is not hyperactive.        Objective   Vitals:  /72   Pulse 83   Ht 1.537 m (5' 0.5\")   Wt 75.8 kg (167 lb)   SpO2 95%   BMI 32.08 kg/m²       Physical Exam  Constitutional:       Appearance: Normal appearance. She is obese.   HENT:      Head: Normocephalic and atraumatic.      Right Ear: Tympanic membrane, ear canal and external ear normal. There is no impacted cerumen.      Left Ear: Tympanic membrane, ear canal and external ear normal. There is no impacted cerumen.      Nose: Nose normal. No congestion or rhinorrhea.      Mouth/Throat:      Mouth: Mucous membranes are moist.      Pharynx: Oropharynx is clear. No oropharyngeal exudate or posterior oropharyngeal erythema.   Eyes:      Extraocular Movements: Extraocular movements intact.      Conjunctiva/sclera: Conjunctivae normal.      Pupils: Pupils are equal, round, and reactive to light.   Neck:      " Vascular: No carotid bruit.   Cardiovascular:      Rate and Rhythm: Normal rate and regular rhythm.      Pulses: Normal pulses.      Heart sounds: Normal heart sounds. No murmur heard.  Pulmonary:      Effort: Pulmonary effort is normal. No respiratory distress.      Breath sounds: Normal breath sounds. No wheezing, rhonchi or rales.   Abdominal:      General: Abdomen is flat. Bowel sounds are normal. There is no distension.      Palpations: Abdomen is soft.      Tenderness: There is no abdominal tenderness.      Hernia: No hernia is present.   Musculoskeletal:         General: No swelling or tenderness. Normal range of motion.      Cervical back: Normal range of motion and neck supple.      Right lower leg: No edema.      Left lower leg: No edema.   Lymphadenopathy:      Cervical: No cervical adenopathy.   Skin:     General: Skin is warm and dry.      Findings: No lesion or rash.   Neurological:      General: No focal deficit present.      Mental Status: She is alert and oriented to person, place, and time.      Cranial Nerves: No cranial nerve deficit.      Sensory: No sensory deficit.      Motor: No weakness.   Psychiatric:         Mood and Affect: Mood normal.         Behavior: Behavior normal.         Thought Content: Thought content normal.         Judgment: Judgment normal.         Assessment & Plan  Moderate dementia, unspecified dementia type, unspecified whether behavioral, psychotic, or mood disturbance or anxiety  Pt's daughter is not sure her mother has dementia as bad as originally thought  Did offer her lab testing [id number] marker that we can test in blood work (she will see if covered and let us know)  Stopped namenda because she felt made pt worse  Encouraged daughter to look for polish clubs/[employer] events were mom can be more social which would help with memory/cognition/motivation    Orders:    Follow Up In Primary Care - Medicare Annual    Follow Up In Primary Care - Established;  Future    Primary hypertension  BP low here  Will stop Amlodipine for now; continue Lisinopril  Monitor BP at home with goal of <140/90 consistently    Orders:    Follow Up In Primary Care - Medicare Annual    Comprehensive Metabolic Panel; Future    CBC; Future    Urinalysis with Reflex Microscopic; Future    Follow Up In Primary Care - [facility]; Future    Class 1 obesity due to excess calories with serious comorbidity and body mass index (BMI) of 32.0 to 32.9 in adult  Encouraged pt to walk at least 10 minutes every day if able   Adhere to healthy diet-lessening carbs/sugars   Orders:    Follow Up In Primary Care - [facility]; Future    Primary open angle glaucoma (POAG) of right eye, mild stage  Sees eye doc  On eye drops        Low serum vitamin B12    Orders:    Vitamin B12; Future    Follow Up In Primary Care - [facility]; Future    Routine general medical examination at a health care facility         Medicare annual wellness visit, subsequent  Daughter declines all vaccines for mother-understands risk   No further screenings indicated

## 2025-06-24 NOTE — PATIENT INSTRUCTIONS
Stop Amlodipine due to low BP-monitor at home and goal is <140/90 consistently; keep Lisinopril at current dose of 40 mg/day   Get blood work done when able-look into dementia blood markers and if covered call and let us know to order ([id number])  Continue healthy diet and try to get her more active-see about socialization at local rec center/senior center/friends/family  Continue all other meds as directed  Covid/flu boosters come fall if interested  Follow up here in 6 months-sooner if needed

## 2025-06-24 NOTE — ASSESSMENT & PLAN NOTE
Encouraged pt to walk at least 10 minutes every day if able   Adhere to healthy diet-lessening carbs/sugars   Orders:    Follow Up In Primary Care - Established; Future

## 2025-06-25 ENCOUNTER — TELEPHONE (OUTPATIENT)
Dept: PRIMARY CARE | Facility: CLINIC | Age: 89
End: 2025-06-25
Payer: MEDICARE

## 2025-06-25 NOTE — TELEPHONE ENCOUNTER
Can we call [facility] and ask how to order this lab-or if able to see on the form (in my office) there may be procedure code that needs typed in-unfortunately I don't have that info right now

## 2025-06-25 NOTE — TELEPHONE ENCOUNTER
Informed pt's daughter that lab order has been put in for [id number] plasma test; used diagnostic code  for age-related cognitive decline based off last encounter notes, but can edit code based on insurance or pt preference. Told daughter to keep us updated on if cost ends up being too much. Pt also has outstanding labs due so will collect all at once.

## 2025-06-25 NOTE — TELEPHONE ENCOUNTER
Pt called saying she changed her mind and would like p-tau [id number] marker testing after all. She says her insurance says it's covered.

## 2025-06-26 LAB
ALBUMIN SERPL-MCNC: 4.1 G/DL (ref 3.6–5.1)
ALP SERPL-CCNC: 62 U/L (ref 37–153)
ALT SERPL-CCNC: 15 U/L (ref 6–29)
ANION GAP SERPL CALCULATED.4IONS-SCNC: 10 MMOL/L (CALC) (ref 7–17)
APPEARANCE UR: CLEAR
AST SERPL-CCNC: 26 U/L (ref 10–35)
BACTERIA #/AREA URNS HPF: ABNORMAL /HPF
BILIRUB SERPL-MCNC: 0.7 MG/DL (ref 0.2–1.2)
BILIRUB UR QL STRIP: NEGATIVE
BUN SERPL-MCNC: 25 MG/DL (ref 7–25)
CALCIUM SERPL-MCNC: 9.2 MG/DL (ref 8.6–10.4)
CHLORIDE SERPL-SCNC: 102 MMOL/L (ref 98–110)
CO2 SERPL-SCNC: 25 MMOL/L (ref 20–32)
COLOR UR: YELLOW
CREAT SERPL-MCNC: 0.86 MG/DL (ref 0.6–0.95)
EGFRCR SERPLBLD CKD-EPI 2021: 65 ML/MIN/1.73M2
ERYTHROCYTE [DISTWIDTH] IN BLOOD BY AUTOMATED COUNT: 13.1 % (ref 11–15)
GLUCOSE SERPL-MCNC: 92 MG/DL (ref 65–99)
GLUCOSE UR QL STRIP: NEGATIVE
HCT VFR BLD AUTO: 44.9 % (ref 35–45)
HGB BLD-MCNC: 14.5 G/DL (ref 11.7–15.5)
HGB UR QL STRIP: NEGATIVE
HYALINE CASTS #/AREA URNS LPF: ABNORMAL /LPF
KETONES UR QL STRIP: NEGATIVE
LEUKOCYTE ESTERASE UR QL STRIP: ABNORMAL
MCH RBC QN AUTO: 32.1 PG (ref 27–33)
MCHC RBC AUTO-ENTMCNC: 32.3 G/DL (ref 32–36)
MCV RBC AUTO: 99.3 FL (ref 80–100)
NITRITE UR QL STRIP: POSITIVE
PH UR STRIP: 5.5 [PH] (ref 5–8)
PLATELET # BLD AUTO: 256 THOUSAND/UL (ref 140–400)
PMV BLD REES-ECKER: 11.3 FL (ref 7.5–12.5)
POTASSIUM SERPL-SCNC: 4.1 MMOL/L (ref 3.5–5.3)
PROT SERPL-MCNC: 6.9 G/DL (ref 6.1–8.1)
PROT UR QL STRIP: NEGATIVE
RBC # BLD AUTO: 4.52 MILLION/UL (ref 3.8–5.1)
RBC #/AREA URNS HPF: ABNORMAL /HPF
SERVICE CMNT-IMP: ABNORMAL
SODIUM SERPL-SCNC: 137 MMOL/L (ref 135–146)
SP GR UR STRIP: 1.02 (ref 1–1.03)
SQUAMOUS #/AREA URNS HPF: ABNORMAL /HPF
VIT B12 SERPL-MCNC: 626 PG/ML (ref 200–1100)
WBC # BLD AUTO: 6.6 THOUSAND/UL (ref 3.8–10.8)
WBC #/AREA URNS HPF: ABNORMAL /HPF

## 2025-06-27 ENCOUNTER — TELEPHONE (OUTPATIENT)
Dept: PRIMARY CARE | Facility: CLINIC | Age: 89
End: 2025-06-27
Payer: MEDICARE

## 2025-06-27 DIAGNOSIS — N30.01 ACUTE CYSTITIS WITH HEMATURIA: Primary | ICD-10-CM

## 2025-06-27 RX ORDER — NITROFURANTOIN 25; 75 MG/1; MG/1
100 CAPSULE ORAL 2 TIMES DAILY
Qty: 14 CAPSULE | Refills: 0 | Status: SHIPPED | OUTPATIENT
Start: 2025-06-27 | End: 2025-07-04

## 2025-06-27 NOTE — TELEPHONE ENCOUNTER
----- Message from Angelita Uriarte sent at 6/27/2025  7:20 AM EDT -----  Pt has uti based on urine given at time of blood draw-I will send in macrobid to take for 7 days   Make sure the one test was done for alzheimers as I do not see it listed as pending   ----- Message -----  From: [facility] Results In  Sent: 6/26/2025   9:54 PM EDT  To: Angelita Uriarte V, DO

## 2025-06-27 NOTE — TELEPHONE ENCOUNTER
Patient daughter informed on results and informed on antibiotics sent in. She told me lab told them it would be after the holiday once this return. I called to verify it was drawn and he told me it was and that it takes a while to come back

## 2025-06-30 LAB — P-TAU217 SERPL IA-MCNC: 0.28 PG/ML

## 2025-07-01 ENCOUNTER — TELEPHONE (OUTPATIENT)
Dept: PRIMARY CARE | Facility: CLINIC | Age: 89
End: 2025-07-01
Payer: MEDICARE

## 2025-07-01 NOTE — TELEPHONE ENCOUNTER
Daughter was informed on results- she tells me that the Namenda was not helping or slowing down progression. Asking what other medications they can consider? Please advise thanks!

## 2025-07-01 NOTE — TELEPHONE ENCOUNTER
Needs to see either neuro or geriatrics to discuss all treatment options-there is aricept, exelon patch that I am aware of but they are more versed in this

## 2025-07-01 NOTE — TELEPHONE ENCOUNTER
----- Message from Angelita Uriarte sent at 6/30/2025  2:58 PM EDT -----  Please let pt daughter know this marker came back positive which is consistent with Alzheimer's dementia-she was on Namenda in past-up to daughter but would consider restarting to help slow the   progression   ----- Message -----  From: [facility] Results In  Sent: 6/30/2025   2:22 PM EDT  To: Angelita Uriarte V, DO

## 2025-07-09 ENCOUNTER — TELEPHONE (OUTPATIENT)
Dept: PRIMARY CARE | Facility: CLINIC | Age: 89
End: 2025-07-09
Payer: MEDICARE

## 2025-07-09 NOTE — TELEPHONE ENCOUNTER
Patient daughter called - she tells me that patient is having really bad headaches and stomach aches since she start antibiotic given to her for UTI (Macrobid). Asking if you have any recommendations or should we make OV? Please advise.

## 2025-07-10 NOTE — TELEPHONE ENCOUNTER
Talked with patient daughter- she said that the medication has been finished for some days and patient is still having symptoms. But the side effects started 2-3 days after taking the medication. Patient is having cramps in stomach and daughter is asking for appt so you can order some testing. Please advise

## 2025-07-10 NOTE — TELEPHONE ENCOUNTER
No testing needed if this is residual from macrobid-would just push fluids/bland diet, can take otc pepto bismal for upset stomach and monitor-should resolve over next few days   If no better then make appt for early next week-just make sure pt is staying well hydrated

## 2025-07-15 ENCOUNTER — TELEPHONE (OUTPATIENT)
Dept: PRIMARY CARE | Facility: CLINIC | Age: 89
End: 2025-07-15
Payer: MEDICARE

## 2025-07-16 ENCOUNTER — OFFICE VISIT (OUTPATIENT)
Dept: PRIMARY CARE | Facility: CLINIC | Age: 89
End: 2025-07-16
Payer: MEDICARE

## 2025-07-16 VITALS — OXYGEN SATURATION: 96 % | SYSTOLIC BLOOD PRESSURE: 141 MMHG | HEART RATE: 94 BPM | DIASTOLIC BLOOD PRESSURE: 80 MMHG

## 2025-07-16 DIAGNOSIS — K29.00 ACUTE SUPERFICIAL GASTRITIS WITHOUT HEMORRHAGE: Primary | ICD-10-CM

## 2025-07-16 DIAGNOSIS — Z87.440 RECENT URINARY TRACT INFECTION: ICD-10-CM

## 2025-07-16 PROCEDURE — 1160F RVW MEDS BY RX/DR IN RCRD: CPT | Performed by: INTERNAL MEDICINE

## 2025-07-16 PROCEDURE — 3077F SYST BP >= 140 MM HG: CPT | Performed by: INTERNAL MEDICINE

## 2025-07-16 PROCEDURE — 1159F MED LIST DOCD IN RCRD: CPT | Performed by: INTERNAL MEDICINE

## 2025-07-16 PROCEDURE — 99213 OFFICE O/P EST LOW 20 MIN: CPT | Performed by: INTERNAL MEDICINE

## 2025-07-16 PROCEDURE — 3079F DIAST BP 80-89 MM HG: CPT | Performed by: INTERNAL MEDICINE

## 2025-07-16 RX ORDER — LANOLIN ALCOHOL/MO/W.PET/CERES
1000 CREAM (GRAM) TOPICAL DAILY
COMMUNITY

## 2025-07-16 RX ORDER — OMEPRAZOLE 40 MG/1
40 CAPSULE, DELAYED RELEASE ORAL
Qty: 30 CAPSULE | Refills: 0 | Status: SHIPPED | OUTPATIENT
Start: 2025-07-16 | End: 2025-08-15

## 2025-07-16 ASSESSMENT — ENCOUNTER SYMPTOMS
DYSURIA: 0
HEMATURIA: 0
ABDOMINAL PAIN: 1
NAUSEA: 0
FREQUENCY: 0
FLANK PAIN: 0
DIARRHEA: 0
DIFFICULTY URINATING: 0
BLOOD IN STOOL: 0
CONSTIPATION: 0
VOMITING: 0
RECTAL PAIN: 0
ANAL BLEEDING: 0
ABDOMINAL DISTENTION: 0

## 2025-07-16 NOTE — PATIENT INSTRUCTIONS
Start back on Omeprazole 40 mg daily-in AM on empty stomach   We are resending urine to confirm no longer infected-may have small bill if insurance doesn't cover (estimate was $20)  Lessen certain foods-chocolate/spicy foods/tomato based/citrus/caffeine as these cause increase in acid/pain  Follow up based on results-should see improvements within 7-10 days

## 2025-07-16 NOTE — ASSESSMENT & PLAN NOTE
Pt has history of GERD in past-was on Omeprazole 40 mg in past-daughter slowly took her off this  Now having epigastric/LUQ pain over last few weeks  Restart PPI 40 mg daily for next 1 full month-explained to daughter pt needs medication for now  Lessen acid containing foods/drinks as able  Follow up based on results

## 2025-07-16 NOTE — PROGRESS NOTES
Subjective   Patient ID: Jessy Masterson is a 89 y.o. female who presents for Abdominal Pain (Pain is 10/10 ).    Abdominal Pain  Pertinent negatives include no constipation, diarrhea, dysuria, frequency, hematuria, nausea or vomiting.     Pt here in acute visit.  She is having epigastric pain since our last visit.  It is worse when she eats and best when she doesn't.  She eats chocolate-dark chocolate squares, a little of wine on July 4th, she will occasionally have pizza-white sauce.  She is belching more.  Has history of GERD and was on Omeprazole 40 mg in past but daughter stopped use as didn't feel mother needed it anymore.      Pt was on macrobid recently for UTI.  Daughter would like urine retested to ensure infection is gone.      Review of Systems   Gastrointestinal:  Positive for abdominal pain. Negative for abdominal distention, anal bleeding, blood in stool, constipation, diarrhea, nausea, rectal pain and vomiting.   Genitourinary:  Negative for difficulty urinating, dyspareunia, dysuria, flank pain, frequency, hematuria and urgency.       Objective   /80 (BP Location: Left arm, Patient Position: Sitting)   Pulse 94   SpO2 96%      Physical Exam  Constitutional:       Appearance: Normal appearance.   Abdominal:      General: Abdomen is flat. There is no distension.      Palpations: Abdomen is soft. There is no mass.      Tenderness: There is abdominal tenderness. There is no right CVA tenderness, left CVA tenderness, guarding or rebound.      Hernia: No hernia is present.      Comments: Epigastric and LUQ discomfort on palpation      Neurological:      Mental Status: She is alert and oriented to person, place, and time.     Psychiatric:         Mood and Affect: Mood normal.         Assessment/Plan   Problem List Items Addressed This Visit       Acute superficial gastritis without hemorrhage - Primary    Pt has history of GERD in past-was on Omeprazole 40 mg in past-daughter slowly took her  off this  Now having epigastric/LUQ pain over last few weeks  Restart PPI 40 mg daily for next 1 full month-explained to daughter pt needs medication for now  Lessen acid containing foods/drinks as able  Follow up based on results          Relevant Medications    omeprazole (PriLOSEC) 40 mg DR capsule     Other Visit Diagnoses         Recent urinary tract infection        Relevant Orders    Urine Culture

## 2025-07-18 ENCOUNTER — RESULTS FOLLOW-UP (OUTPATIENT)
Dept: PRIMARY CARE | Facility: CLINIC | Age: 89
End: 2025-07-18
Payer: MEDICARE

## 2025-07-18 DIAGNOSIS — N30.00 ACUTE CYSTITIS WITHOUT HEMATURIA: Primary | ICD-10-CM

## 2025-07-18 RX ORDER — SULFAMETHOXAZOLE AND TRIMETHOPRIM 800; 160 MG/1; MG/1
1 TABLET ORAL 2 TIMES DAILY
Qty: 14 TABLET | Refills: 0 | Status: SHIPPED | OUTPATIENT
Start: 2025-07-18 | End: 2025-07-25

## 2025-07-18 NOTE — TELEPHONE ENCOUNTER
Patient daughter informed on results and medication sent in- she states understanding no other questions or concerns noted right now.

## 2025-07-18 NOTE — TELEPHONE ENCOUNTER
----- Message from Angelita Uriarte sent at 7/18/2025  7:46 AM EDT -----  Pt has another urine infection-will give different antibiotic-will use bactrim 1 pill twice a day for 7 days   ----- Message -----  From: [facility] Results In  Sent: 7/18/2025   6:19 AM EDT  To: Angelita CHAPMAN DO

## 2025-07-19 LAB — BACTERIA UR CULT: ABNORMAL

## 2025-08-04 ENCOUNTER — HOSPITAL ENCOUNTER (OUTPATIENT)
Dept: RADIOLOGY | Facility: CLINIC | Age: 89
Discharge: HOME | End: 2025-08-04
Payer: MEDICARE

## 2025-08-04 ENCOUNTER — LAB REQUISITION (OUTPATIENT)
Dept: LAB | Facility: HOSPITAL | Age: 89
End: 2025-08-04

## 2025-08-04 ENCOUNTER — HOSPITAL ENCOUNTER (INPATIENT)
Facility: HOSPITAL | Age: 89
End: 2025-08-04
Attending: STUDENT IN AN ORGANIZED HEALTH CARE EDUCATION/TRAINING PROGRAM | Admitting: INTERNAL MEDICINE
Payer: MEDICARE

## 2025-08-04 ENCOUNTER — APPOINTMENT (OUTPATIENT)
Dept: CARDIOLOGY | Facility: HOSPITAL | Age: 89
DRG: 418 | End: 2025-08-04
Payer: MEDICARE

## 2025-08-04 ENCOUNTER — OFFICE VISIT (OUTPATIENT)
Dept: PRIMARY CARE | Facility: CLINIC | Age: 89
End: 2025-08-04
Payer: MEDICARE

## 2025-08-04 VITALS — OXYGEN SATURATION: 96 % | SYSTOLIC BLOOD PRESSURE: 118 MMHG | DIASTOLIC BLOOD PRESSURE: 73 MMHG | HEART RATE: 87 BPM

## 2025-08-04 DIAGNOSIS — R11.2 NAUSEA AND VOMITING, UNSPECIFIED VOMITING TYPE: ICD-10-CM

## 2025-08-04 DIAGNOSIS — R10.13 EPIGASTRIC PAIN: ICD-10-CM

## 2025-08-04 DIAGNOSIS — R10.13 EPIGASTRIC PAIN: Primary | ICD-10-CM

## 2025-08-04 DIAGNOSIS — G89.18 ACUTE POST-OPERATIVE PAIN: ICD-10-CM

## 2025-08-04 DIAGNOSIS — R63.0 APPETITE LOSS: ICD-10-CM

## 2025-08-04 DIAGNOSIS — K80.50 CHOLEDOCHOLITHIASIS: Primary | ICD-10-CM

## 2025-08-04 DIAGNOSIS — D50.9 IRON DEFICIENCY ANEMIA, UNSPECIFIED IRON DEFICIENCY ANEMIA TYPE: ICD-10-CM

## 2025-08-04 DIAGNOSIS — R11.2 NAUSEA WITH VOMITING, UNSPECIFIED: ICD-10-CM

## 2025-08-04 DIAGNOSIS — Z90.49 S/P LAPAROSCOPIC COLECTOMY: ICD-10-CM

## 2025-08-04 DIAGNOSIS — F03.90 DEMENTIA WITHOUT BEHAVIORAL DISTURBANCE, PSYCHOTIC DISTURBANCE, MOOD DISTURBANCE, OR ANXIETY, UNSPECIFIED DEMENTIA SEVERITY, UNSPECIFIED DEMENTIA TYPE: ICD-10-CM

## 2025-08-04 LAB
ABO GROUP (TYPE) IN BLOOD: NORMAL
ABO GROUP (TYPE) IN BLOOD: NORMAL
ALBUMIN SERPL BCP-MCNC: 3.8 G/DL (ref 3.4–5)
ALP SERPL-CCNC: 518 U/L (ref 33–136)
ALT SERPL W P-5'-P-CCNC: 295 U/L (ref 7–45)
ANION GAP SERPL CALC-SCNC: 18 MMOL/L (ref 10–20)
ANTIBODY SCREEN: NORMAL
APPEARANCE UR: CLEAR
AST SERPL W P-5'-P-CCNC: 238 U/L (ref 9–39)
BASOPHILS # BLD AUTO: 0.03 X10*3/UL (ref 0–0.1)
BASOPHILS NFR BLD AUTO: 0.3 %
BILIRUB SERPL-MCNC: 8 MG/DL (ref 0–1.2)
BILIRUB UR STRIP.AUTO-MCNC: NEGATIVE MG/DL
BUN SERPL-MCNC: 30 MG/DL (ref 6–23)
CALCIUM SERPL-MCNC: 8.7 MG/DL (ref 8.6–10.3)
CARDIAC TROPONIN I PNL SERPL HS: 19 NG/L (ref 0–13)
CHLORIDE SERPL-SCNC: 99 MMOL/L (ref 98–107)
CO2 SERPL-SCNC: 21 MMOL/L (ref 21–32)
COLOR UR: YELLOW
CREAT SERPL-MCNC: 1.15 MG/DL (ref 0.5–1.05)
EGFRCR SERPLBLD CKD-EPI 2021: 46 ML/MIN/1.73M*2
EOSINOPHIL # BLD AUTO: 0.01 X10*3/UL (ref 0–0.4)
EOSINOPHIL NFR BLD AUTO: 0.1 %
ERYTHROCYTE [DISTWIDTH] IN BLOOD BY AUTOMATED COUNT: 14.1 % (ref 11.5–14.5)
GLUCOSE SERPL-MCNC: 97 MG/DL (ref 74–99)
GLUCOSE UR STRIP.AUTO-MCNC: NORMAL MG/DL
HCT VFR BLD AUTO: 39.2 % (ref 36–46)
HGB BLD-MCNC: 12.7 G/DL (ref 12–16)
IMM GRANULOCYTES # BLD AUTO: 0.04 X10*3/UL (ref 0–0.5)
IMM GRANULOCYTES NFR BLD AUTO: 0.4 % (ref 0–0.9)
INR PPP: 1.1 (ref 0.9–1.1)
KETONES UR STRIP.AUTO-MCNC: NEGATIVE MG/DL
LEUKOCYTE ESTERASE UR QL STRIP.AUTO: NEGATIVE
LIPASE SERPL-CCNC: 12 U/L (ref 9–82)
LYMPHOCYTES # BLD AUTO: 1.05 X10*3/UL (ref 0.8–3)
LYMPHOCYTES NFR BLD AUTO: 11.5 %
MAGNESIUM SERPL-MCNC: 1.91 MG/DL (ref 1.6–2.4)
MCH RBC QN AUTO: 31.8 PG (ref 26–34)
MCHC RBC AUTO-ENTMCNC: 32.4 G/DL (ref 32–36)
MCV RBC AUTO: 98 FL (ref 80–100)
MONOCYTES # BLD AUTO: 0.78 X10*3/UL (ref 0.05–0.8)
MONOCYTES NFR BLD AUTO: 8.6 %
NEUTROPHILS # BLD AUTO: 7.19 X10*3/UL (ref 1.6–5.5)
NEUTROPHILS NFR BLD AUTO: 79.1 %
NITRITE UR QL STRIP.AUTO: NEGATIVE
NRBC BLD-RTO: 0 /100 WBCS (ref 0–0)
PH UR STRIP.AUTO: 6 [PH]
PLATELET # BLD AUTO: 276 X10*3/UL (ref 150–450)
POTASSIUM SERPL-SCNC: 4.3 MMOL/L (ref 3.5–5.3)
PROT SERPL-MCNC: 6.4 G/DL (ref 6.4–8.2)
PROT UR STRIP.AUTO-MCNC: NEGATIVE MG/DL
PROTHROMBIN TIME: 12.6 SECONDS (ref 9.8–12.4)
RBC # BLD AUTO: 4 X10*6/UL (ref 4–5.2)
RBC # UR STRIP.AUTO: NEGATIVE MG/DL
RH FACTOR (ANTIGEN D): NORMAL
RH FACTOR (ANTIGEN D): NORMAL
SODIUM SERPL-SCNC: 134 MMOL/L (ref 136–145)
SP GR UR STRIP.AUTO: 1
UROBILINOGEN UR STRIP.AUTO-MCNC: NORMAL MG/DL
WBC # BLD AUTO: 9.1 X10*3/UL (ref 4.4–11.3)

## 2025-08-04 PROCEDURE — 36415 COLL VENOUS BLD VENIPUNCTURE: CPT | Performed by: STUDENT IN AN ORGANIZED HEALTH CARE EDUCATION/TRAINING PROGRAM

## 2025-08-04 PROCEDURE — 1036F TOBACCO NON-USER: CPT | Performed by: INTERNAL MEDICINE

## 2025-08-04 PROCEDURE — 80053 COMPREHEN METABOLIC PANEL: CPT

## 2025-08-04 PROCEDURE — 86850 RBC ANTIBODY SCREEN: CPT | Performed by: STUDENT IN AN ORGANIZED HEALTH CARE EDUCATION/TRAINING PROGRAM

## 2025-08-04 PROCEDURE — 2500000004 HC RX 250 GENERAL PHARMACY W/ HCPCS (ALT 636 FOR OP/ED): Performed by: INTERNAL MEDICINE

## 2025-08-04 PROCEDURE — 99214 OFFICE O/P EST MOD 30 MIN: CPT | Performed by: INTERNAL MEDICINE

## 2025-08-04 PROCEDURE — 74150 CT ABDOMEN W/O CONTRAST: CPT

## 2025-08-04 PROCEDURE — 83735 ASSAY OF MAGNESIUM: CPT | Performed by: STUDENT IN AN ORGANIZED HEALTH CARE EDUCATION/TRAINING PROGRAM

## 2025-08-04 PROCEDURE — 1200000002 HC GENERAL ROOM WITH TELEMETRY DAILY

## 2025-08-04 PROCEDURE — 1160F RVW MEDS BY RX/DR IN RCRD: CPT | Performed by: INTERNAL MEDICINE

## 2025-08-04 PROCEDURE — 85610 PROTHROMBIN TIME: CPT | Performed by: STUDENT IN AN ORGANIZED HEALTH CARE EDUCATION/TRAINING PROGRAM

## 2025-08-04 PROCEDURE — 86901 BLOOD TYPING SEROLOGIC RH(D): CPT | Performed by: STUDENT IN AN ORGANIZED HEALTH CARE EDUCATION/TRAINING PROGRAM

## 2025-08-04 PROCEDURE — 74150 CT ABDOMEN W/O CONTRAST: CPT | Performed by: RADIOLOGY

## 2025-08-04 PROCEDURE — 1159F MED LIST DOCD IN RCRD: CPT | Performed by: INTERNAL MEDICINE

## 2025-08-04 PROCEDURE — 93005 ELECTROCARDIOGRAM TRACING: CPT

## 2025-08-04 PROCEDURE — 2500000004 HC RX 250 GENERAL PHARMACY W/ HCPCS (ALT 636 FOR OP/ED): Performed by: STUDENT IN AN ORGANIZED HEALTH CARE EDUCATION/TRAINING PROGRAM

## 2025-08-04 PROCEDURE — 99222 1ST HOSP IP/OBS MODERATE 55: CPT | Performed by: INTERNAL MEDICINE

## 2025-08-04 PROCEDURE — 83690 ASSAY OF LIPASE: CPT

## 2025-08-04 PROCEDURE — 99222 1ST HOSP IP/OBS MODERATE 55: CPT

## 2025-08-04 PROCEDURE — 99285 EMERGENCY DEPT VISIT HI MDM: CPT | Performed by: STUDENT IN AN ORGANIZED HEALTH CARE EDUCATION/TRAINING PROGRAM

## 2025-08-04 PROCEDURE — 81003 URINALYSIS AUTO W/O SCOPE: CPT | Performed by: INTERNAL MEDICINE

## 2025-08-04 PROCEDURE — 84484 ASSAY OF TROPONIN QUANT: CPT | Performed by: STUDENT IN AN ORGANIZED HEALTH CARE EDUCATION/TRAINING PROGRAM

## 2025-08-04 PROCEDURE — 3074F SYST BP LT 130 MM HG: CPT | Performed by: INTERNAL MEDICINE

## 2025-08-04 PROCEDURE — 3078F DIAST BP <80 MM HG: CPT | Performed by: INTERNAL MEDICINE

## 2025-08-04 PROCEDURE — 85025 COMPLETE CBC W/AUTO DIFF WBC: CPT

## 2025-08-04 RX ORDER — ACETAMINOPHEN 650 MG/1
650 SUPPOSITORY RECTAL EVERY 4 HOURS PRN
Status: ACTIVE | OUTPATIENT
Start: 2025-08-04

## 2025-08-04 RX ORDER — ACETAMINOPHEN 325 MG/1
650 TABLET ORAL EVERY 4 HOURS PRN
Status: DISPENSED | OUTPATIENT
Start: 2025-08-04

## 2025-08-04 RX ORDER — CIPROFLOXACIN 2 MG/ML
400 INJECTION, SOLUTION INTRAVENOUS EVERY 12 HOURS
Status: DISCONTINUED | OUTPATIENT
Start: 2025-08-04 | End: 2025-08-10

## 2025-08-04 RX ORDER — HYDRALAZINE HYDROCHLORIDE 20 MG/ML
10 INJECTION INTRAMUSCULAR; INTRAVENOUS EVERY 8 HOURS PRN
Status: DISPENSED | OUTPATIENT
Start: 2025-08-04

## 2025-08-04 RX ORDER — PANTOPRAZOLE SODIUM 40 MG/10ML
40 INJECTION, POWDER, LYOPHILIZED, FOR SOLUTION INTRAVENOUS
Status: DISPENSED | OUTPATIENT
Start: 2025-08-05

## 2025-08-04 RX ORDER — MORPHINE SULFATE 2 MG/ML
2 INJECTION, SOLUTION INTRAMUSCULAR; INTRAVENOUS EVERY 4 HOURS PRN
Status: DISCONTINUED | OUTPATIENT
Start: 2025-08-04 | End: 2025-08-08 | Stop reason: SDUPTHER

## 2025-08-04 RX ORDER — ENOXAPARIN SODIUM 100 MG/ML
40 INJECTION SUBCUTANEOUS EVERY 24 HOURS
Status: DISPENSED | OUTPATIENT
Start: 2025-08-04

## 2025-08-04 RX ORDER — ONDANSETRON HYDROCHLORIDE 2 MG/ML
4 INJECTION, SOLUTION INTRAVENOUS EVERY 8 HOURS PRN
Status: ACTIVE | OUTPATIENT
Start: 2025-08-04

## 2025-08-04 RX ORDER — PANTOPRAZOLE SODIUM 40 MG/1
40 TABLET, DELAYED RELEASE ORAL
Status: DISPENSED | OUTPATIENT
Start: 2025-08-05

## 2025-08-04 RX ORDER — GUAIFENESIN 600 MG/1
600 TABLET, EXTENDED RELEASE ORAL EVERY 12 HOURS PRN
Status: ACTIVE | OUTPATIENT
Start: 2025-08-04

## 2025-08-04 RX ORDER — ACETAMINOPHEN 160 MG/5ML
650 SOLUTION ORAL EVERY 4 HOURS PRN
Status: ACTIVE | OUTPATIENT
Start: 2025-08-04

## 2025-08-04 RX ORDER — SODIUM CHLORIDE, SODIUM LACTATE, POTASSIUM CHLORIDE, CALCIUM CHLORIDE 600; 310; 30; 20 MG/100ML; MG/100ML; MG/100ML; MG/100ML
75 INJECTION, SOLUTION INTRAVENOUS CONTINUOUS
Status: ACTIVE | OUTPATIENT
Start: 2025-08-04 | End: 2025-08-05

## 2025-08-04 RX ORDER — POLYETHYLENE GLYCOL 3350 17 G/17G
17 POWDER, FOR SOLUTION ORAL DAILY PRN
Status: DISCONTINUED | OUTPATIENT
Start: 2025-08-04 | End: 2025-08-08

## 2025-08-04 RX ORDER — ONDANSETRON 4 MG/1
4 TABLET, FILM COATED ORAL EVERY 8 HOURS PRN
Status: ACTIVE | OUTPATIENT
Start: 2025-08-04

## 2025-08-04 RX ADMIN — CIPROFLOXACIN 400 MG: 2 INJECTION, SOLUTION INTRAVENOUS at 19:58

## 2025-08-04 RX ADMIN — SODIUM CHLORIDE, SODIUM LACTATE, POTASSIUM CHLORIDE, AND CALCIUM CHLORIDE 75 ML/HR: .6; .31; .03; .02 INJECTION, SOLUTION INTRAVENOUS at 20:59

## 2025-08-04 RX ADMIN — SODIUM CHLORIDE, POTASSIUM CHLORIDE, SODIUM LACTATE AND CALCIUM CHLORIDE 1000 ML: 600; 310; 30; 20 INJECTION, SOLUTION INTRAVENOUS at 18:49

## 2025-08-04 SDOH — SOCIAL STABILITY: SOCIAL INSECURITY: WITHIN THE LAST YEAR, HAVE YOU BEEN HUMILIATED OR EMOTIONALLY ABUSED IN OTHER WAYS BY YOUR PARTNER OR EX-PARTNER?: NO

## 2025-08-04 SDOH — SOCIAL STABILITY: SOCIAL INSECURITY
WITHIN THE LAST YEAR, HAVE YOU BEEN RAPED OR FORCED TO HAVE ANY KIND OF SEXUAL ACTIVITY BY YOUR PARTNER OR EX-PARTNER?: NO

## 2025-08-04 SDOH — SOCIAL STABILITY: SOCIAL INSECURITY: ARE YOU OR HAVE YOU BEEN THREATENED OR ABUSED PHYSICALLY, EMOTIONALLY, OR SEXUALLY BY ANYONE?: NO

## 2025-08-04 SDOH — SOCIAL STABILITY: SOCIAL INSECURITY
WITHIN THE LAST YEAR, HAVE YOU BEEN KICKED, HIT, SLAPPED, OR OTHERWISE PHYSICALLY HURT BY YOUR PARTNER OR EX-PARTNER?: NO

## 2025-08-04 SDOH — ECONOMIC STABILITY: INCOME INSECURITY: IN THE PAST 12 MONTHS HAS THE ELECTRIC, GAS, OIL, OR WATER COMPANY THREATENED TO SHUT OFF SERVICES IN YOUR HOME?: NO

## 2025-08-04 SDOH — SOCIAL STABILITY: SOCIAL INSECURITY: HAS ANYONE EVER THREATENED TO HURT YOUR FAMILY OR YOUR PETS?: NO

## 2025-08-04 SDOH — SOCIAL STABILITY: SOCIAL INSECURITY: ABUSE: ADULT

## 2025-08-04 SDOH — SOCIAL STABILITY: SOCIAL INSECURITY: ARE THERE ANY APPARENT SIGNS OF INJURIES/BEHAVIORS THAT COULD BE RELATED TO ABUSE/NEGLECT?: NO

## 2025-08-04 SDOH — SOCIAL STABILITY: SOCIAL INSECURITY: WITHIN THE LAST YEAR, HAVE YOU BEEN AFRAID OF YOUR PARTNER OR EX-PARTNER?: NO

## 2025-08-04 SDOH — ECONOMIC STABILITY: FOOD INSECURITY: WITHIN THE PAST 12 MONTHS, THE FOOD YOU BOUGHT JUST DIDN'T LAST AND YOU DIDN'T HAVE MONEY TO GET MORE.: NEVER TRUE

## 2025-08-04 SDOH — ECONOMIC STABILITY: FOOD INSECURITY: WITHIN THE PAST 12 MONTHS, YOU WORRIED THAT YOUR FOOD WOULD RUN OUT BEFORE YOU GOT THE MONEY TO BUY MORE.: NEVER TRUE

## 2025-08-04 SDOH — SOCIAL STABILITY: SOCIAL INSECURITY: DO YOU FEEL ANYONE HAS EXPLOITED OR TAKEN ADVANTAGE OF YOU FINANCIALLY OR OF YOUR PERSONAL PROPERTY?: NO

## 2025-08-04 SDOH — SOCIAL STABILITY: SOCIAL INSECURITY: WERE YOU ABLE TO COMPLETE ALL THE BEHAVIORAL HEALTH SCREENINGS?: YES

## 2025-08-04 SDOH — SOCIAL STABILITY: SOCIAL INSECURITY: DOES ANYONE TRY TO KEEP YOU FROM HAVING/CONTACTING OTHER FRIENDS OR DOING THINGS OUTSIDE YOUR HOME?: NO

## 2025-08-04 SDOH — SOCIAL STABILITY: SOCIAL INSECURITY: DO YOU FEEL UNSAFE GOING BACK TO THE PLACE WHERE YOU ARE LIVING?: NO

## 2025-08-04 SDOH — SOCIAL STABILITY: SOCIAL INSECURITY: HAVE YOU HAD THOUGHTS OF HARMING ANYONE ELSE?: NO

## 2025-08-04 ASSESSMENT — COGNITIVE AND FUNCTIONAL STATUS - GENERAL
TURNING FROM BACK TO SIDE WHILE IN FLAT BAD: A LITTLE
MOVING FROM LYING ON BACK TO SITTING ON SIDE OF FLAT BED WITH BEDRAILS: A LITTLE
HELP NEEDED FOR BATHING: A LITTLE
DRESSING REGULAR LOWER BODY CLOTHING: A LITTLE
PERSONAL GROOMING: A LITTLE
PATIENT BASELINE BEDBOUND: NO
DRESSING REGULAR UPPER BODY CLOTHING: A LITTLE
TOILETING: A LITTLE
PERSONAL GROOMING: A LITTLE
TURNING FROM BACK TO SIDE WHILE IN FLAT BAD: A LITTLE
MOVING TO AND FROM BED TO CHAIR: A LITTLE
CLIMB 3 TO 5 STEPS WITH RAILING: A LOT
DRESSING REGULAR UPPER BODY CLOTHING: A LITTLE
TOILETING: A LITTLE
TOILETING: A LITTLE
MOVING TO AND FROM BED TO CHAIR: A LITTLE
DRESSING REGULAR LOWER BODY CLOTHING: A LITTLE
CLIMB 3 TO 5 STEPS WITH RAILING: A LOT
MOVING FROM LYING ON BACK TO SITTING ON SIDE OF FLAT BED WITH BEDRAILS: A LITTLE
WALKING IN HOSPITAL ROOM: A LITTLE
MOVING TO AND FROM BED TO CHAIR: A LITTLE
CLIMB 3 TO 5 STEPS WITH RAILING: A LOT
DRESSING REGULAR UPPER BODY CLOTHING: A LITTLE
MOBILITY SCORE: 17
WALKING IN HOSPITAL ROOM: A LITTLE
STANDING UP FROM CHAIR USING ARMS: A LITTLE
PERSONAL GROOMING: A LITTLE
DAILY ACTIVITIY SCORE: 19
HELP NEEDED FOR BATHING: A LITTLE
DAILY ACTIVITIY SCORE: 19
DAILY ACTIVITIY SCORE: 19
HELP NEEDED FOR BATHING: A LITTLE
MOVING FROM LYING ON BACK TO SITTING ON SIDE OF FLAT BED WITH BEDRAILS: A LITTLE
TURNING FROM BACK TO SIDE WHILE IN FLAT BAD: A LITTLE
MOBILITY SCORE: 17
STANDING UP FROM CHAIR USING ARMS: A LITTLE
MOBILITY SCORE: 17
STANDING UP FROM CHAIR USING ARMS: A LITTLE
WALKING IN HOSPITAL ROOM: A LITTLE
DRESSING REGULAR LOWER BODY CLOTHING: A LITTLE

## 2025-08-04 ASSESSMENT — LIFESTYLE VARIABLES
SKIP TO QUESTIONS 9-10: 1
HOW OFTEN DO YOU HAVE 6 OR MORE DRINKS ON ONE OCCASION: NEVER
AUDIT-C TOTAL SCORE: 0
AUDIT-C TOTAL SCORE: 0
HOW OFTEN DO YOU HAVE A DRINK CONTAINING ALCOHOL: NEVER
HOW MANY STANDARD DRINKS CONTAINING ALCOHOL DO YOU HAVE ON A TYPICAL DAY: PATIENT DOES NOT DRINK

## 2025-08-04 ASSESSMENT — ENCOUNTER SYMPTOMS
ABDOMINAL PAIN: 1
WHEEZING: 0
ANAL BLEEDING: 0
ABDOMINAL PAIN: 1
FATIGUE: 1
RECTAL PAIN: 0
CONSTIPATION: 0
SYNCOPE: 1
BLOOD IN STOOL: 0
VOMITING: 1
NAUSEA: 1
ACTIVITY CHANGE: 1
PALPITATIONS: 0
CHEST TIGHTNESS: 0
COUGH: 0
WEAKNESS: 1
APPETITE CHANGE: 1
DIARRHEA: 0
ABDOMINAL DISTENTION: 0
SHORTNESS OF BREATH: 0

## 2025-08-04 ASSESSMENT — ACTIVITIES OF DAILY LIVING (ADL)
LACK_OF_TRANSPORTATION: NO
GROOMING: INDEPENDENT
PATIENT'S MEMORY ADEQUATE TO SAFELY COMPLETE DAILY ACTIVITIES?: NO
FEEDING YOURSELF: INDEPENDENT
ASSISTIVE_DEVICE: WALKER;CANE
JUDGMENT_ADEQUATE_SAFELY_COMPLETE_DAILY_ACTIVITIES: YES
DRESSING YOURSELF: INDEPENDENT
LACK_OF_TRANSPORTATION: NO
TOILETING: INDEPENDENT
ADEQUATE_TO_COMPLETE_ADL: YES
HEARING - LEFT EAR: FUNCTIONAL
HEARING - RIGHT EAR: HEARING AID
WALKS IN HOME: INDEPENDENT
BATHING: INDEPENDENT
LACK_OF_TRANSPORTATION: NO

## 2025-08-04 ASSESSMENT — PAIN SCALES - GENERAL
PAINLEVEL_OUTOF10: 0 - NO PAIN
PAINLEVEL_OUTOF10: 8

## 2025-08-04 ASSESSMENT — PATIENT HEALTH QUESTIONNAIRE - PHQ9
SUM OF ALL RESPONSES TO PHQ9 QUESTIONS 1 & 2: 0
2. FEELING DOWN, DEPRESSED OR HOPELESS: NOT AT ALL
1. LITTLE INTEREST OR PLEASURE IN DOING THINGS: NOT AT ALL

## 2025-08-04 ASSESSMENT — PAIN - FUNCTIONAL ASSESSMENT: PAIN_FUNCTIONAL_ASSESSMENT: 0-10

## 2025-08-04 ASSESSMENT — PAIN DESCRIPTION - PAIN TYPE: TYPE: ACUTE PAIN

## 2025-08-04 ASSESSMENT — PAIN DESCRIPTION - LOCATION: LOCATION: ABDOMEN

## 2025-08-04 ASSESSMENT — PAIN DESCRIPTION - ORIENTATION: ORIENTATION: MID;UPPER

## 2025-08-04 NOTE — ED PROVIDER NOTES
Emergency Department Provider Note        History of Present Illness     Chief Complaint: Abdominal Pain and Syncope   History provided by: Patient and Family Member  Limitations to History: Dementia  External Chart Review: See ED Course    HPI:  Jessy Masterson is a 89 y.o. female with PMHx of hypertension, dementia presenting to the ED for choledocholithiasis discovered on outpatient CT scan.    Patient and daughter report that over the past month the patient has been having ongoing issues with epigastric discomfort and nausea/vomiting that has been getting worse over the past few weeks.  Yesterday while at [employer], the patient also had a syncopal episode, did not fall or strike her head.  Denies any ongoing chest pain.  They contacted the patient's primary care doctor who arranged for outpatient labs and an outpatient CT abdomen/pelvis that was concerning for choledocholithiasis.  Patient was subsequently sent to the ED for admission, GI consultation, and consideration of ERCP.  Daughter has not noted any fevers at home.  Reports that patient has had minimal p.o. intake secondary to nausea/vomiting and has had frequent belching.  No hematemesis, melena, hematochezia.  She reports that patient has intermittently seemed slightly more confused than usual but currently feels patient is at her baseline.    Physical Exam   Triage vitals:  T 36.5 °C (97.7 °F)  HR 82  /62  RR 16  O2 97 % None (Room air)    Constitutional: Awake, alert, not in acute distress  HENT: Head atraumatic, mucous membranes moist  Eyes:  Conjunctivae normal  Neck:  Supple  Lungs: Clear to auscultation, breath sounds equal and symmetric, no wheezes, rales, or rhonchi  Heart: Regular rate and rhythm, no murmur, rub or gallop, 2+ radial and DP pulses bilaterally  Abdomen: Soft, nontender, nondistended, no rebound or guarding  Extremities: No lower extremity edema  Neuro: Alert, no focal deficit  Skin: Warm, dry  Psych: Calm,  cooperative     Medical Decision Making & ED Course   Medical Decision Making:  Jessy Masterson is a 89 y.o. female with PMHx as above in HPI who presented to the ED for choledocholithiasis discovered on outpatient CT scan. Patient was afebrile, hemodynamically stable, and satting on room air on arrival.     Exam as above.    ED Course as of 08/05/25 0343   Mon Aug 04, 2025   1723 External chart review:  CT A/P today  IMPRESSION:  Extensive choledocholithiasis with mild cholelithiasis. There is  diffuse intrahepatic and extrahepatic biliary ductal dilatation  ending at the stone in the region of the ampulla of Vater. Please see  above for details. Recommend ERCP in follow-up.      Cholelithiasis with mild gallbladder dilatation but no adjacent fluid.      Nonspecific hyperdense material in the stomach. This could be  ingested medication and/or blood (such as from gastritis). Clinical  correlation is needed.      Several parapelvic left renal cysts.      Mild-to-moderate stool throughout the imaged colon.       [SS]   1724 External chart review:  Labs obtained today outpatient  CBC No leukocytosis, anemia, or thrombocytopenia  CMP slight JOSE, no clinically significant electrolyte abnormalities, suggestive of acute liver injury  Lipase not suggestive of pancreatitis     [SS]   1750 ECG 12 lead  I have personally reviewed and interpreted this EKG.  Normal sinus rhythm, rate 82 BPM  Normal axis  MO interval and QRS duration within normal limits.  QTc within normal limits.  No signs of acute ischemia or infarction   [SS]   1812 Troponin I, High Sensitivity(!)  Will trend [SS]   1820 Personally discussed currently available facts and concerns about the case with Dr Whelan, who advises accepts to medicine   [SS]      ED Course User Index  [SS] Steffen Simerlink, MD         Diagnoses as of 08/05/25 0343   Choledocholithiasis       Outpatient labs and CT reviewed as above.  EKG here is nonischemic.  Will admit to  medicine for further workup, GI consultation.  ------------------------------------------------  Independent Test Interpretation: See ED Course  Personal Discussion with Other Providers: See ED Course    Disposition   As a result of their workup, the patient will require admission to the hospital.  The patient was informed of her diagnosis.  The patient was given the opportunity to ask questions and I answered them. The patient agreed to be admitted to the hospital.    Procedures   Procedures    Steffen Simerlink, MD Steffen Simerlink, MD  08/05/25 0343

## 2025-08-04 NOTE — ED TRIAGE NOTES
Pt reports to ED for epigastric pain and syncope episode yesterday. Per daughter pt hs having epigastric pain x1 month and her provider called to come to ER. Per pt's provider she has gallstone and blockage. Pt had syncope episode yesterday at [employer]. Denies fall. Pt has h/o dementia.

## 2025-08-04 NOTE — PATIENT INSTRUCTIONS
For now get blood work and imaging done and we will call with results  Continue bland diet (BRAT diet) and push fluids as able to tolerate for hydration purposes  May need to go to ER based on results and how she is doing (if weaker, more somnolent, more confused-to ER immediately)

## 2025-08-04 NOTE — H&P
Jessy Masterson is a 89 y.o. female   Abdominal Pain    Syncope  Associated symptoms include abdominal pain.      Patient with a past medical history of hypertension and obesity presents with progressively worsening abdominal pain over the last few few weeks  Pain gets aggravated on eating and associated with nausea  Saw her primary care doctor who ordered us a CAT scan of the abdomen and pelvis which shows choledocholithiasis with dilated bile ducts  Blood work confirms transaminitis  Patient denies any fevers chills chest pain palpitations or diarrhea    Past Medical History  Medical History[1]    Surgical History  Surgical History[2]     Social History  She reports that she has never smoked. She has never used smokeless tobacco. She reports that she does not currently use alcohol. She reports that she does not use drugs.    Family History  Family History[3]     Allergies  Aspirin, Ibuprofen, and Penicillins    Review of Systems   Cardiovascular:  Positive for syncope.   Gastrointestinal:  Positive for abdominal pain.        Constitutional: not feeling poorly, no fever, no recent weight gain and no recent weight loss.   Eyes: no blurred vision and no diplopia.   ENT: no hearing loss, no tinnitus, no earache, no sore throat, no hoarseness and no swollen glands in the neck.   Cardiovascular: no chest pain, no tightness or heavy pressure, no shortness of breath, no palpitations and no lower extremity edema.   Respiratory: no cough, wheezing or shortness of breath at rest or exertion  Gastrointestinal: no signs and symptoms of ulcer disease, no dwight colored stools and no intolerance to fatty foods.   Genitourinary: no urinary frequency, no dysuria, no hematuria, no burning sensation during urination, urinary stream is not smaller and urinary stream does not start and stop.   Musculoskeletal: no arthralgias, no joint stiffness, no muscle weakness, no back pain and no difficulty walking.   Skin: no rashes,  no change in skin color and pigmentation, no skin lesions and no skin lumps.   Neurological: no headaches, no dizziness, no seizures, no tingling, no numbness, no signs and symptoms of stroke and no limb weakness.   Psychiatric: no confusion, no memory lapses or loss, no depression and no sleep disturbances.   Endocrine: no goiter, no thyroid disorder, no diabetes mellitus, no excessive thirst, no dry skin, no cold intolerance, no heat intolerance and no increased urinary frequency.   Hematologic/Lymphatic: is not slow to heal, does not bleed easily, does not bruise easily, no thrombophlebitis, no anemia and no history of blood transfusion.   All other systems have been reviewed and are negative for complaint.     Vitals:    08/04/25 1900   BP: 155/63   Pulse: 81   Resp: 17   Temp:    SpO2: 96%        Scheduled medications  Scheduled Medications[4]  Continuous medications  Continuous Medications[5]  PRN medications  PRN Medications[6]    Results from last 7 days   Lab Units 08/04/25  1300   WBC AUTO x10*3/uL 9.1   HEMOGLOBIN g/dL 12.7   HEMATOCRIT % 39.2   PLATELETS AUTO x10*3/uL 276     Results from last 7 days   Lab Units 08/04/25  1300   SODIUM mmol/L 134*   POTASSIUM mmol/L 4.3   CHLORIDE mmol/L 99   CO2 mmol/L 21   BUN mg/dL 30*   CREATININE mg/dL 1.15*   CALCIUM mg/dL 8.7   PROTEIN TOTAL g/dL 6.4   BILIRUBIN TOTAL mg/dL 8.0*   ALK PHOS U/L 518*   ALT U/L 295*   AST U/L 238*   GLUCOSE mg/dL 97     Results from last 7 days   Lab Units 08/04/25  1728   TROPHS ng/L 19*        No orders to display       Physical Exam      Constitutional   General appearance: Alert and in no acute distress.   Eyes   Inspection of eyes: Sclera and conjunctiva were normal.    Pupil exam: Pupils were equal in size. Extraocular movements were intact.   Pulmonary   Respiratory assessment: No respiratory distress, normal respiratory rhythm and effort.    Auscultation of Lungs: Clear bilateral breath sounds.   Cardiovascular   Auscultation  of heart: Apical pulse normal, heart rate and rhythm normal, normal S1 and S2, no murmurs and no pericardial rub.    Exam for edema: No peripheral edema.   Abdomen   Abdominal Exam: Epigastric tenderness  Liver and Spleen exam: No hepato-splenomegaly.   Musculoskeletal     Inspection/palpation of joints, bones and muscles: No joint swelling. Normal movement of all extremities.   Skin   Skin inspection: Normal skin color and pigmentation, normal skin turgor and no visible rash.   Neurologic   Cranial nerves: Nerves 2-12 were intact, no focal neuro defects.   Psychiatric   Orientation: Oriented to person, place, and time.    Mood and affect: Normal.      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  [4] lactated Ringer's, 1,000 mL, intravenous, Once    [5]    [6]

## 2025-08-04 NOTE — PROGRESS NOTES
Subjective   Patient ID: Jessy Masterson is a 89 y.o. female who presents for Follow-up (Passed out yesterday in [employer]- on going stomach and chest pain, pain doesn't radiate anywhere  ).    HPI  Pt here in acute visit with daughter.  Daughter tells me pt is not doing well-she is vomiting with even just water consumption.  She has been on PPI since our last visit in mid July.  She had been doing ok until this weekend.  She has lower appetite.  Her bowels are ok.  Pt points to pain in between her breast.  She is burping more.  She is very weak-staying in bed mostly.  She went to [employer] yesterday and per daughter thinks may have passed out-seemed very groggy at one point.  She is on PPI therapy daily.  Per daughter her diet is mainly bland.      Review of Systems   Constitutional:  Positive for activity change, appetite change and fatigue.   Respiratory:  Negative for cough, chest tightness, shortness of breath and wheezing.    Cardiovascular:  Negative for chest pain, palpitations and leg swelling.   Gastrointestinal:  Positive for abdominal pain, nausea and vomiting. Negative for abdominal distention, anal bleeding, blood in stool, constipation, diarrhea and rectal pain.   Neurological:  Positive for weakness.       Objective   /73 (BP Location: Left arm, Patient Position: Sitting)   Pulse 87   SpO2 96%    Physical Exam  Constitutional:       Appearance: Normal appearance.     Cardiovascular:      Rate and Rhythm: Normal rate and regular rhythm.      Heart sounds: Normal heart sounds.   Pulmonary:      Effort: Pulmonary effort is normal.      Breath sounds: Normal breath sounds.   Abdominal:      General: Abdomen is flat. There is no distension.      Palpations: Abdomen is soft. There is no mass.      Tenderness: There is abdominal tenderness. There is no right CVA tenderness, left CVA tenderness, guarding or rebound.      Hernia: No hernia is present.      Comments: Epigastric pain noted with  palpation      Musculoskeletal:      Right lower leg: No edema.      Left lower leg: No edema.     Neurological:      Mental Status: She is alert and oriented to person, place, and time.      Motor: Weakness present.     Psychiatric:         Mood and Affect: Mood normal.         Assessment/Plan   Problem List Items Addressed This Visit    None  Visit Diagnoses         Epigastric pain    -  Primary    Relevant Orders    CT abdomen wo IV contrast    Comprehensive Metabolic Panel    CBC and Auto Differential    Urinalysis with Reflex Microscopic    Lipase      Nausea and vomiting, unspecified vomiting type        Relevant Orders    CT abdomen wo IV contrast    Comprehensive Metabolic Panel    CBC and Auto Differential    Urinalysis with Reflex Microscopic    Lipase      Appetite loss

## 2025-08-05 ENCOUNTER — ANESTHESIA (OUTPATIENT)
Dept: GASTROENTEROLOGY | Facility: HOSPITAL | Age: 89
End: 2025-08-05
Payer: MEDICARE

## 2025-08-05 ENCOUNTER — ANESTHESIA EVENT (OUTPATIENT)
Dept: GASTROENTEROLOGY | Facility: HOSPITAL | Age: 89
End: 2025-08-05
Payer: MEDICARE

## 2025-08-05 ENCOUNTER — APPOINTMENT (OUTPATIENT)
Dept: GASTROENTEROLOGY | Facility: HOSPITAL | Age: 89
DRG: 418 | End: 2025-08-05
Payer: MEDICARE

## 2025-08-05 LAB
ALBUMIN SERPL BCP-MCNC: 3.4 G/DL (ref 3.4–5)
ALP SERPL-CCNC: 399 U/L (ref 33–136)
ALT SERPL W P-5'-P-CCNC: 214 U/L (ref 7–45)
ANION GAP SERPL CALC-SCNC: 12 MMOL/L (ref 10–20)
APPEARANCE UR: ABNORMAL
AST SERPL W P-5'-P-CCNC: 158 U/L (ref 9–39)
BACTERIA #/AREA URNS HPF: ABNORMAL /HPF
BILIRUB DIRECT SERPL-MCNC: 3.2 MG/DL (ref 0–0.3)
BILIRUB SERPL-MCNC: 4.9 MG/DL (ref 0–1.2)
BILIRUB UR QL STRIP: ABNORMAL
BUN SERPL-MCNC: 20 MG/DL (ref 6–23)
CALCIUM SERPL-MCNC: 8.6 MG/DL (ref 8.6–10.3)
CHLORIDE SERPL-SCNC: 107 MMOL/L (ref 98–107)
CO2 SERPL-SCNC: 24 MMOL/L (ref 21–32)
COLOR UR: ABNORMAL
CREAT SERPL-MCNC: 0.75 MG/DL (ref 0.5–1.05)
EGFRCR SERPLBLD CKD-EPI 2021: 77 ML/MIN/1.73M*2
ERYTHROCYTE [DISTWIDTH] IN BLOOD BY AUTOMATED COUNT: 13.8 % (ref 11.5–14.5)
GLUCOSE SERPL-MCNC: 99 MG/DL (ref 74–99)
GLUCOSE UR QL STRIP: NEGATIVE
HCT VFR BLD AUTO: 34.2 % (ref 36–46)
HGB BLD-MCNC: 11.7 G/DL (ref 12–16)
HGB UR QL STRIP: NEGATIVE
HOLD SPECIMEN: NORMAL
HYALINE CASTS #/AREA URNS LPF: ABNORMAL /LPF
KETONES UR QL STRIP: NEGATIVE
LEUKOCYTE ESTERASE UR QL STRIP: ABNORMAL
MCH RBC QN AUTO: 31.7 PG (ref 26–34)
MCHC RBC AUTO-ENTMCNC: 34.2 G/DL (ref 32–36)
MCV RBC AUTO: 93 FL (ref 80–100)
NITRITE UR QL STRIP: POSITIVE
NRBC BLD-RTO: 0 /100 WBCS (ref 0–0)
PH UR STRIP: 5.5 [PH] (ref 5–8)
PLATELET # BLD AUTO: 257 X10*3/UL (ref 150–450)
POTASSIUM SERPL-SCNC: 3.6 MMOL/L (ref 3.5–5.3)
PROT SERPL-MCNC: 5.9 G/DL (ref 6.4–8.2)
PROT UR QL STRIP: ABNORMAL
RBC # BLD AUTO: 3.69 X10*6/UL (ref 4–5.2)
RBC #/AREA URNS HPF: ABNORMAL /HPF
SERVICE CMNT-IMP: ABNORMAL
SODIUM SERPL-SCNC: 139 MMOL/L (ref 136–145)
SP GR UR STRIP: 1.02 (ref 1–1.03)
SQUAMOUS #/AREA URNS HPF: ABNORMAL /HPF
WBC # BLD AUTO: 6.4 X10*3/UL (ref 4.4–11.3)
WBC #/AREA URNS HPF: ABNORMAL /HPF

## 2025-08-05 PROCEDURE — 85027 COMPLETE CBC AUTOMATED: CPT | Performed by: INTERNAL MEDICINE

## 2025-08-05 PROCEDURE — 36415 COLL VENOUS BLD VENIPUNCTURE: CPT | Performed by: INTERNAL MEDICINE

## 2025-08-05 PROCEDURE — 99221 1ST HOSP IP/OBS SF/LOW 40: CPT | Performed by: SURGERY

## 2025-08-05 PROCEDURE — 99232 SBSQ HOSP IP/OBS MODERATE 35: CPT | Performed by: INTERNAL MEDICINE

## 2025-08-05 PROCEDURE — 1200000002 HC GENERAL ROOM WITH TELEMETRY DAILY

## 2025-08-05 PROCEDURE — 82248 BILIRUBIN DIRECT: CPT

## 2025-08-05 PROCEDURE — 2500000004 HC RX 250 GENERAL PHARMACY W/ HCPCS (ALT 636 FOR OP/ED): Performed by: INTERNAL MEDICINE

## 2025-08-05 PROCEDURE — 80048 BASIC METABOLIC PNL TOTAL CA: CPT | Performed by: INTERNAL MEDICINE

## 2025-08-05 PROCEDURE — 2500000001 HC RX 250 WO HCPCS SELF ADMINISTERED DRUGS (ALT 637 FOR MEDICARE OP)

## 2025-08-05 PROCEDURE — 80053 COMPREHEN METABOLIC PANEL: CPT | Performed by: INTERNAL MEDICINE

## 2025-08-05 PROCEDURE — 99223 1ST HOSP IP/OBS HIGH 75: CPT | Performed by: INTERNAL MEDICINE

## 2025-08-05 RX ORDER — ACETAMINOPHEN 325 MG/1
650 TABLET ORAL EVERY 4 HOURS PRN
Status: CANCELLED | OUTPATIENT
Start: 2025-08-05

## 2025-08-05 RX ORDER — LISINOPRIL 20 MG/1
40 TABLET ORAL ONCE
Status: COMPLETED | OUTPATIENT
Start: 2025-08-05 | End: 2025-08-05

## 2025-08-05 RX ORDER — ONDANSETRON HYDROCHLORIDE 2 MG/ML
4 INJECTION, SOLUTION INTRAVENOUS ONCE AS NEEDED
Status: CANCELLED | OUTPATIENT
Start: 2025-08-05

## 2025-08-05 RX ORDER — METRONIDAZOLE 500 MG/100ML
500 INJECTION, SOLUTION INTRAVENOUS EVERY 8 HOURS
Status: DISCONTINUED | OUTPATIENT
Start: 2025-08-05 | End: 2025-08-10

## 2025-08-05 RX ORDER — DROPERIDOL 2.5 MG/ML
0.62 INJECTION, SOLUTION INTRAMUSCULAR; INTRAVENOUS ONCE AS NEEDED
Status: CANCELLED | OUTPATIENT
Start: 2025-08-05

## 2025-08-05 RX ORDER — ALBUTEROL SULFATE 0.83 MG/ML
2.5 SOLUTION RESPIRATORY (INHALATION) ONCE AS NEEDED
Status: CANCELLED | OUTPATIENT
Start: 2025-08-05

## 2025-08-05 RX ORDER — OXYCODONE HYDROCHLORIDE 5 MG/1
5 TABLET ORAL EVERY 4 HOURS PRN
Refills: 0 | Status: CANCELLED | OUTPATIENT
Start: 2025-08-05

## 2025-08-05 RX ORDER — DIPHENHYDRAMINE HYDROCHLORIDE 50 MG/ML
25 INJECTION, SOLUTION INTRAMUSCULAR; INTRAVENOUS ONCE AS NEEDED
Status: CANCELLED | OUTPATIENT
Start: 2025-08-05

## 2025-08-05 RX ADMIN — CIPROFLOXACIN 400 MG: 2 INJECTION, SOLUTION INTRAVENOUS at 20:39

## 2025-08-05 RX ADMIN — CIPROFLOXACIN 400 MG: 2 INJECTION, SOLUTION INTRAVENOUS at 09:55

## 2025-08-05 RX ADMIN — METRONIDAZOLE 500 MG: 500 INJECTION, SOLUTION INTRAVENOUS at 22:11

## 2025-08-05 RX ADMIN — PANTOPRAZOLE SODIUM 40 MG: 40 INJECTION, POWDER, FOR SOLUTION INTRAVENOUS at 06:04

## 2025-08-05 RX ADMIN — LISINOPRIL 40 MG: 20 TABLET ORAL at 17:45

## 2025-08-05 RX ADMIN — SODIUM CHLORIDE, SODIUM LACTATE, POTASSIUM CHLORIDE, AND CALCIUM CHLORIDE 75 ML/HR: .6; .31; .03; .02 INJECTION, SOLUTION INTRAVENOUS at 13:50

## 2025-08-05 RX ADMIN — METRONIDAZOLE 500 MG: 500 INJECTION, SOLUTION INTRAVENOUS at 14:42

## 2025-08-05 ASSESSMENT — COGNITIVE AND FUNCTIONAL STATUS - GENERAL
DAILY ACTIVITIY SCORE: 19
DRESSING REGULAR UPPER BODY CLOTHING: A LITTLE
TURNING FROM BACK TO SIDE WHILE IN FLAT BAD: A LITTLE
MOBILITY SCORE: 17
WALKING IN HOSPITAL ROOM: A LITTLE
DRESSING REGULAR LOWER BODY CLOTHING: A LITTLE
TURNING FROM BACK TO SIDE WHILE IN FLAT BAD: A LITTLE
CLIMB 3 TO 5 STEPS WITH RAILING: A LOT
DAILY ACTIVITIY SCORE: 19
TURNING FROM BACK TO SIDE WHILE IN FLAT BAD: A LITTLE
HELP NEEDED FOR BATHING: A LITTLE
TOILETING: A LITTLE
PERSONAL GROOMING: A LITTLE
MOVING TO AND FROM BED TO CHAIR: A LITTLE
HELP NEEDED FOR BATHING: A LITTLE
HELP NEEDED FOR BATHING: A LITTLE
DRESSING REGULAR UPPER BODY CLOTHING: A LITTLE
MOVING TO AND FROM BED TO CHAIR: A LITTLE
MOVING TO AND FROM BED TO CHAIR: A LITTLE
MOVING FROM LYING ON BACK TO SITTING ON SIDE OF FLAT BED WITH BEDRAILS: A LITTLE
MOVING TO AND FROM BED TO CHAIR: A LITTLE
WALKING IN HOSPITAL ROOM: A LITTLE
MOVING FROM LYING ON BACK TO SITTING ON SIDE OF FLAT BED WITH BEDRAILS: A LITTLE
WALKING IN HOSPITAL ROOM: A LITTLE
TOILETING: A LITTLE
MOVING FROM LYING ON BACK TO SITTING ON SIDE OF FLAT BED WITH BEDRAILS: A LITTLE
DRESSING REGULAR LOWER BODY CLOTHING: A LITTLE
HELP NEEDED FOR BATHING: A LITTLE
PERSONAL GROOMING: A LITTLE
DRESSING REGULAR LOWER BODY CLOTHING: A LITTLE
PERSONAL GROOMING: A LITTLE
TURNING FROM BACK TO SIDE WHILE IN FLAT BAD: A LITTLE
DAILY ACTIVITIY SCORE: 19
DRESSING REGULAR UPPER BODY CLOTHING: A LITTLE
MOVING TO AND FROM BED TO CHAIR: A LITTLE
STANDING UP FROM CHAIR USING ARMS: A LITTLE
TURNING FROM BACK TO SIDE WHILE IN FLAT BAD: A LITTLE
CLIMB 3 TO 5 STEPS WITH RAILING: A LOT
HELP NEEDED FOR BATHING: A LITTLE
PERSONAL GROOMING: A LITTLE
DRESSING REGULAR UPPER BODY CLOTHING: A LITTLE
PERSONAL GROOMING: A LITTLE
WALKING IN HOSPITAL ROOM: A LITTLE
TOILETING: A LITTLE
DRESSING REGULAR LOWER BODY CLOTHING: A LITTLE
STANDING UP FROM CHAIR USING ARMS: A LITTLE
CLIMB 3 TO 5 STEPS WITH RAILING: A LOT
MOBILITY SCORE: 17
MOBILITY SCORE: 17
CLIMB 3 TO 5 STEPS WITH RAILING: A LOT
TOILETING: A LITTLE
MOVING FROM LYING ON BACK TO SITTING ON SIDE OF FLAT BED WITH BEDRAILS: A LITTLE
CLIMB 3 TO 5 STEPS WITH RAILING: A LOT
STANDING UP FROM CHAIR USING ARMS: A LITTLE
MOVING FROM LYING ON BACK TO SITTING ON SIDE OF FLAT BED WITH BEDRAILS: A LITTLE
DRESSING REGULAR LOWER BODY CLOTHING: A LITTLE
TOILETING: A LITTLE
STANDING UP FROM CHAIR USING ARMS: A LITTLE
WALKING IN HOSPITAL ROOM: A LITTLE
STANDING UP FROM CHAIR USING ARMS: A LITTLE
DRESSING REGULAR UPPER BODY CLOTHING: A LITTLE

## 2025-08-05 ASSESSMENT — ENCOUNTER SYMPTOMS
ABDOMINAL PAIN: 1
VOMITING: 1
NAUSEA: 1

## 2025-08-05 ASSESSMENT — PAIN - FUNCTIONAL ASSESSMENT
PAIN_FUNCTIONAL_ASSESSMENT: 0-10

## 2025-08-05 ASSESSMENT — PAIN SCALES - GENERAL
PAINLEVEL_OUTOF10: 5 - MODERATE PAIN
PAINLEVEL_OUTOF10: 0 - NO PAIN
PAINLEVEL_OUTOF10: 5 - MODERATE PAIN

## 2025-08-05 ASSESSMENT — ACTIVITIES OF DAILY LIVING (ADL): LACK_OF_TRANSPORTATION: NO

## 2025-08-05 NOTE — ANESTHESIA PREPROCEDURE EVALUATION
Patient: Jessy Masterson    Procedure Information       Date/Time: 08/05/25 1625    Scheduled providers: Kit Zapien MD; Finn Lawson MD; Yenny Teresa RN; JEOVANNY Roberts    Procedure: ERCP    Location: [facility]            Relevant Problems   Cardiac  Orthostatic hypotension   (+) Abnormal EKG   (+) HTN (hypertension)      Neuro  Optic neuropathy   (+) Dementia   (+) Depression, recurrent      GI   (+) Gastroesophageal reflux disease without esophagitis      Liver  Choledocholithiasis  Dilated CBD     (+) Choledocholithiasis   (+) Elevated liver function tests      Endocrine   (+) Class 1 obesity due to excess calories with serious comorbidity and body mass index (BMI) of 32.0 to 32.9 in adult      Hematology   (+) Anemia      HEENT   (+) Asymmetrical sensorineural hearing loss   (+) Chronic open angle glaucoma of both eyes, moderate stage   (+) Glaucoma   (+) Hearing loss, right   (+) Primary open angle glaucoma   (+) Primary open angle glaucoma (POAG) of right eye, mild stage   (+) Sensorineural hearing loss, bilateral                                                         Pre-Anesthesia Evaluation                                         Jessy Masterson is a 89 y.o. female who presents for the above mentioned procedure due to Choledocholithiasis [K80.50]    Medical History[1]  Surgical History[2]  Social History[3]  RX Allergies[4]  Current Medications[5]  Prior to Admission medications   Medication Sig Start Date End Date Taking? Authorizing Provider   cholecalciferol (Vitamin D-3) 25 MCG (1000 UT) capsule Take by mouth.    Historical Provider, MD   cyanocobalamin (Vitamin B-12) 1,000 mcg tablet Take 1 tablet (1,000 mcg) by mouth once daily.    Historical Provider, MD   latanoprost (Xalatan) 0.005 % ophthalmic solution instill one drop in each eye at bedtime    Historical Provider, MD   lisinopril 40 mg tablet TAKE ONE TABLET BY MOUTH ONCE A DAY 11/27/24  "  Hank Savage MD   omeprazole (PriLOSEC) 40 mg DR capsule Take 1 capsule (40 mg) by mouth once daily in the morning. Take before meals. Do not crush or chew. 7/16/25 8/15/25  Angelita Uriarte V, DO   CRANBERRY ORAL Take by mouth. 1/10/23 8/4/25  Historical Provider, MD     No medication comments found.    Visit Vitals  Smoking Status Never                         BP Readings from Last 4 Encounters:   08/06/25 154/82   08/04/25 118/73   07/16/25 141/80   06/24/25 108/72      If applicable:  No results found for: \"PREGTESTUR\", \"PREGSERUM\", \"HCG\", \"HCGQUANT\"        Recent Labs     08/05/25 0537 08/04/25  1300   WBC 6.4 9.1   HGB 11.7* 12.7   HCT 34.2* 39.2    276   MCV 93 98     Recent Labs     08/05/25  0537 08/04/25  1300   EGFR 77 46*   ANIONGAP 12 18   BUN 20 30*   CREATININE 0.75 1.15*    134*   K 3.6 4.3    99   CO2 24 21   GLUCOSE 99 97   CALCIUM 8.6 8.7     Recent Labs     08/04/25  1728 04/21/22  1510   TSH  --  3.05   TROPHS 19*  --      Recent Labs     08/05/25  0537 08/04/25  1300 03/30/22  0635 03/29/22  0000   BILITOT 4.9* 8.0*   < >  --    PROT 5.9* 6.4   < >  --    ALBUMIN 3.4 3.8   < >  --    ALKPHOS 399* 518*   < >  --    * 295*   < >  --    * 238*   < >  --    LIPASE  --  12  --  43    < > = values in this interval not displayed.     Recent Labs     08/04/25  1728   PROTIME 12.6*   INR 1.1     Lab Results   Component Value Date    ABO A 08/04/2025    LABRH NEG 08/04/2025    ABSCRN NEG 08/04/2025     No results found for: \"FERRITIN\", \"TIBC\", \"IRONSAT\"  No results found for: \"STAPHMRSASCR\"  No results for input(s): \"AMPHETAMINE\", \"MAMPHBLDS\", \"BARBITURATE\", \"BENZODIAZ\", \"BUPRENBLDS\", \"CANNABBLDS\", \"COCBLDS\", \"METHABLDS\", \"OXYBLDS\", \"PCPBLDS\", \"OPIATBLDS\", \"DRBLDCOMM\" in the last 08492 hours.  No results for input(s): \"PHART\", \"WKS7ZOL\", \"PO2ART\", \"OQH5GUU\", \"O0SJMLXQ\", \"BEART\", \"N6ZKNFAY\" in the last 64798 hours.    EKG   Encounter Date: 08/04/25   ECG 12 lead   Result " "Value    Ventricular Rate 82    Atrial Rate 82    AL Interval 158    QRS Duration 82    QT Interval 402    QTC Calculation(Bazett) 469    P Axis 72    R Axis -2    T Axis 34    QRS Count 13    Q Onset 218    P Onset 139    P Offset 188    T Offset 419    QTC Fredericia 446    Narrative    Sinus rhythm with occasional Premature ventricular complexes  Cannot rule out Anterior infarct , age undetermined  Abnormal ECG  When compared with ECG of 28-MAR-2022 19:55,  PREVIOUS ECG IS PRESENT      Ejection Fractions:No results found for: \"EF\"  Echocardiogram   Results for orders placed in visit on 01/21/21    Echocardiogram    Narrative  [facility], [address] [facility] [id number] [phone] and  Fax [phone]    TRANSTHORACIC ECHOCARDIOGRAM REPORT      Patient Name:     RAMONA DEWEY Reading Physician:   [id number] Hank Savage MD, [facility]  Study Date:       1/21/2021            Referring Physician: HANK SAVAGE  MRN/PID:          [id number]             PCP:                 [id number] [address] MD  Accession/Order#: [id number]         [facility]  YOB: 1935            Fellow:  Gender:           F                    Nurse:  Admit Date:       1/21/2021            Sonographer:         Katherine Alva \"RDCS,  RCS\"  Admission Status: Outpatient           Additional Staff:  Height:           154.94 cm            CC Report to:  Weight:           69.85 kg             Study Type:          Echocardiogram  BSA:              1.69 m2  Blood Pressure: 140 /68 mmHg    Diagnosis/ICD: R94.31-Abnormal electrocardiogram [ECG] [EKG]; R07.89-Other chest  pain; I10-Essential (primary) hypertension  Indication:    Abnormal EKG, Chest Pain, Hypertension  Procedure/CPT: Echo Complete w Full Doppler-[id number]  Study Detail: The following Echo studies were performed: 2D, M-Mode, Doppler and  color flow. Agitated saline used as a contrast agent for  intraseptal flow evaluation. Total contrast " used for this  procedure was 10 mL via IV push.      PHYSICIAN INTERPRETATION:  Left Ventricle: The left ventricular systolic function is normal, with an estimated ejection fraction of 65-70%. There are no regional wall motion abnormalities. The left ventricular cavity size is decreased. There is moderate left ventricular hypertrophy. Spectral Doppler shows an impaired relaxation pattern of left ventricular diastolic filling.  Left Atrium: The left atrium is mildly dilated.  Right Ventricle: The right ventricle is normal in size. There is normal right ventricular global systolic function.  Right Atrium: The right atrium is normal in size.  Aortic Valve: The aortic valve appears structurally normal. There is no evidence of aortic valve regurgitation. The peak instantaneous gradient of the aortic valve is 11.0 mmHg. The mean gradient of the aortic valve is 5.8 mmHg.  Mitral Valve: The mitral valve is normal in structure. There is mild mitral valve regurgitation.  Tricuspid Valve: The tricuspid valve is structurally normal. There is mild tricuspid regurgitation.  Pulmonic Valve: The pulmonic valve is structurally normal. There is mild pulmonic valve regurgitation.  Pericardium: There is no pericardial effusion noted.  Aorta: The aortic root is normal.  Pulmonary Artery: The main pulmonary artery is normal in size, and position, with normal bifurcation into the left and right pulmonary arteries.  Systemic Veins: The inferior vena cava appears to be of normal size.      CONCLUSIONS:  1. The left ventricular systolic function is normal with a 65-70% estimated ejection fraction.  2. Spectral Doppler shows an impaired relaxation pattern of left ventricular diastolic filling.  3. There is moderate left ventricular hypertrophy.  4. The left ventricular cavity size is decreased.  5. Normal right ventricular systolic pressure.    QUANTITATIVE DATA SUMMARY:  2D MEASUREMENTS:  Normal Ranges:  LAs:           2.86 cm    (2.7-4.0cm)  RVIDd:         2.71 cm   (0.9-3.6cm)  IVSd:          1.11 cm   (0.6-1.1cm)  LVPWd:         1.11 cm   (0.6-1.1cm)  LVIDd:         3.66 cm   (3.9-5.9cm)  LVIDs:         2.40 cm  LV Mass Index: 75.9 g/m2  LV % FS        34.4 %    LA VOLUME:  Normal Ranges:  LA Area A4C:     14.8 cm2  LA Area A2C:     15.6 cm2  LA Volume Index: 25.4 ml/m2  LA Vol A4C:      40.2 ml  LA Vol A2C:      34.8 ml    RA VOLUME BY A/L METHOD:  Normal Ranges:  RA Area A4C: 9.6 cm2    M-MODE MEASUREMENTS:  Normal Ranges:  AoV Exc: 1.40 cm (1.5-2.5cm)    AORTA MEASUREMENTS:  Normal Ranges:  AoV Exc: 1.40 cm (1.5-2.5cm)    LV SYSTOLIC FUNCTION BY 2D PLANIMETRY (MOD):  Normal Ranges:  EF-A4C View: 70.8 % (>55%)  EF-A2C View: 66.2 %  EF-Biplane:  68.1 %    LV DIASTOLIC FUNCTION:  Normal Ranges:  MV Peak E: 0.78 m/s (0.7-1.2 m/s)  MV Peak A: 1.37 m/s (0.42-0.7 m/s)  E/A Ratio: 0.57     (1.0-2.2)    MITRAL VALVE:  Normal Ranges:  MV DT: 266 msec (150-240msec)    AORTIC VALVE:  Normal Ranges:  AoV Vmax:                1.66 m/s  (<1.7m/s)  AoV Peak P.0 mmHg (<20mmHg)  AoV Mean P.8 mmHg  (1.7-11.5mmHg)  LVOT Max Augustus:            1.06 m/s  (<1.1m/s)  AoV VTI:                 36.44 cm  (18-25cm)  LVOT VTI:                26.36 cm  LVOT Diameter:           2.10 cm   (1.8-2.4cm)  AoV Area, VTI:           2.50 cm2  (2.5-5.5cm2)  AoV Area,Vmax:           2.22 cm2  (2.5-4.5cm2)  AoV Dimensionless Index: 0.72    RIGHT VENTRICLE:  RV 1   2.8 cm  RV 2   2.4 cm  RV 3   5.9 cm  TAPSE: 22.0 mm  RV s'  0.16 m/s    TRICUSPID VALVE/RVSP:  Normal Ranges:  Peak TR Velocity: 2.46 m/s  RV Syst Pressure: 27.1 mmHg (< 30mmHg)    AORTA:  Asc Ao Diam 2.74 cm      [id number] Hnak Savage MD, [facility]  Electronically signed on 2021 at 5:56:09 PM            Stress TestingNo results found for this or any previous visit from the past 85929 days.   No results found for this or any previous visit from the past 365 days.   No results found for  "this or any previous visit from the past 365 days.     Cardiac Catheterization  No results found for this or any previous visit from the past 365 days.   No results found for this or any previous visit from the past 365 days.   No results found for this or any previous visit from the past 365 days.     Right Heart Cath No results found for this or any previous visit from the past 365 days.    Cardiac Scoring The patient's coronary artery calcium score on   No results found for this or any previous visit from the past 365 days.     AAA screen No results found for this or any previous visit from the past 365 days.     Carotid DopplerNo results found for this or any previous visit from the past 48779 days.      X Ray === 02/13/24 ===    XR KNEE 3 VIEWS LEFT    - Impression -  Degenerative changes.    Trace left suprapatellar effusion.      MACRO:  None    Signed by: Jessica Agrawal 2/14/2024 5:59 PM  Dictation workstation:   [id number]  Pulmonary Function Tests   No results found for this or any previous visit from the past 365 days.    No results found for: \"FEV1\", \"FVC\", \"CGQ0XFU\", \"TLC\", \"DLCO\"   OTHER: No results found for this or any previous visit from the past 1825 days.    The ASCVD Risk score (Unity DK, et al., 2019) failed to calculate for the following reasons:    The 2019 ASCVD risk score is only valid for ages 40 to 79    Code Status: Full Code                   Clinical information reviewed:                    Medical History[6]   Surgical History[7]  Social History[8]   Current Outpatient Medications   Medication Instructions   • cholecalciferol (Vitamin D-3) 25 MCG (1000 UT) capsule Take by mouth.   • cyanocobalamin (VITAMIN B-12) 1,000 mcg, Daily   • latanoprost (Xalatan) 0.005 % ophthalmic solution instill one drop in each eye at bedtime   • lisinopril 40 mg, oral, Daily   • omeprazole (PRILOSEC) 40 mg, oral, Daily before breakfast, Do not crush or chew.      RX Allergies[9]     Chemistry    Lab " "Results   Component Value Date/Time     08/05/2025 0537     06/26/2025 0843    K 3.6 08/05/2025 0537    K 4.1 06/26/2025 0843     08/05/2025 0537     06/26/2025 0843    CO2 24 08/05/2025 0537    CO2 25 06/26/2025 0843    BUN 20 08/05/2025 0537    BUN 25 06/26/2025 0843    CREATININE 0.75 08/05/2025 0537    CREATININE 0.86 06/26/2025 0843    Lab Results   Component Value Date/Time    CALCIUM 8.6 08/05/2025 0537    CALCIUM 9.2 06/26/2025 0843    ALKPHOS 399 (H) 08/05/2025 0537    ALKPHOS 62 06/26/2025 0843     (H) 08/05/2025 0537    AST 26 06/26/2025 0843     (H) 08/05/2025 0537    ALT 15 06/26/2025 0843    BILITOT 4.9 (H) 08/05/2025 0537    BILITOT 0.7 06/26/2025 0843          No results found for: \"HGBA1C\"  Lab Results   Component Value Date/Time    WBC 6.4 08/05/2025 0537    WBC 6.6 06/26/2025 0843    HGB 11.7 (L) 08/05/2025 0537    HGB 14.5 06/26/2025 0843    HCT 34.2 (L) 08/05/2025 0537    HCT 44.9 06/26/2025 0843     08/05/2025 0537     06/26/2025 0843    ABO A 08/04/2025 1801    LABRH NEG 08/04/2025 1801    ABSCRN NEG 08/04/2025 1728     Lab Results   Component Value Date/Time    PROTIME 12.6 (H) 08/04/2025 1728    INR 1.1 08/04/2025 1728     Encounter Date: 08/04/25   ECG 12 lead   Result Value    Ventricular Rate 82    Atrial Rate 82    ME Interval 158    QRS Duration 82    QT Interval 402    QTC Calculation(Bazett) 469    P Axis 72    R Axis -2    T Axis 34    QRS Count 13    Q Onset 218    P Onset 139    P Offset 188    T Offset 419    QTC Fredericia 446    Narrative    Sinus rhythm with occasional Premature ventricular complexes  Cannot rule out Anterior infarct , age undetermined  Abnormal ECG  When compared with ECG of 28-MAR-2022 19:55,  PREVIOUS ECG IS PRESENT      No results found for this or any previous visit from the past 1095 days.      Echocardiogram 1/21/2021:  PHYSICIAN INTERPRETATION:  Left Ventricle: The left ventricular systolic function is " normal, with an estimated ejection fraction of 65-70%. There are no regional wall motion abnormalities. The left ventricular cavity size is decreased. There is moderate left ventricular hypertrophy. Spectral Doppler shows an impaired relaxation pattern of left ventricular diastolic filling.  Left Atrium: The left atrium is mildly dilated.  Right Ventricle: The right ventricle is normal in size. There is normal right ventricular global systolic function.  Right Atrium: The right atrium is normal in size.  Aortic Valve: The aortic valve appears structurally normal. There is no evidence of aortic valve regurgitation. The peak instantaneous gradient of the aortic valve is 11.0 mmHg. The mean gradient of the aortic valve is 5.8 mmHg.  Mitral Valve: The mitral valve is normal in structure. There is mild mitral valve regurgitation.  Tricuspid Valve: The tricuspid valve is structurally normal. There is mild tricuspid regurgitation.  Pulmonic Valve: The pulmonic valve is structurally normal. There is mild pulmonic valve regurgitation.  Pericardium: There is no pericardial effusion noted.  Aorta: The aortic root is normal.  Pulmonary Artery: The main pulmonary artery is normal in size, and position, with normal bifurcation into the left and right pulmonary arteries.  Systemic Veins: The inferior vena cava appears to be of normal size.      CONCLUSIONS:  1. The left ventricular systolic function is normal with a 65-70% estimated ejection fraction.  2. Spectral Doppler shows an impaired relaxation pattern of left ventricular diastolic filling.  3. There is moderate left ventricular hypertrophy.  4. The left ventricular cavity size is decreased.  5. Normal right ventricular systolic pressure.    Visit Vitals  Smoking Status Never     No data recorded    Physical Exam    Airway  Mallampati: II  TM distance: >3 FB  Neck ROM: full  Mouth opening: 3 or more finger widths     Cardiovascular - normal exam   Dental - normal exam      Pulmonary - normal exam   Abdominal - normal exam           Anesthesia Plan    History of general anesthesia?: yes  History of complications of general anesthesia?: no    ASA 3     general     intravenous induction   Anesthetic plan and risks discussed with patient.    Plan discussed with CRNA.               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   • Rectal bleeding 05/19/2023   [2]  Past Surgical History:  Procedure Laterality Date   • BLADDER SUSPENSION     • CATARACT EXTRACTION W/  INTRAOCULAR LENS IMPLANT     • SINUS SURGERY     • TONSILLECTOMY     • TOTAL ABDOMINAL HYSTERECTOMY W/ BILATERAL SALPINGOOPHORECTOMY     [3]  Social History  Tobacco Use   • Smoking status: Never   • Smokeless tobacco: Never   Vaping Use   • Vaping status: Never Used   Substance Use Topics   • Alcohol use: Not Currently     Comment: occasional alcohol   • Drug use: Never   [4]  Allergies  Allergen Reactions   • Aspirin Other     upset stomach   • Ibuprofen Unknown     motrin   • Penicillins Hives, Rash and Unknown     tolerated ceftriaxone 2022     [5]  No current facility-administered medications for this visit.  No current outpatient medications on file.    Facility-Administered Medications Ordered in Other Visits:   •  acetaminophen (Tylenol) tablet 650 mg, 650 mg, oral, q4h PRN **OR** acetaminophen (Tylenol) oral liquid 650 mg, 650 mg, oral, q4h PRN **OR** acetaminophen (Tylenol) suppository 650 mg, 650 mg, rectal, q4h PRN, Chana Whelan MD  •  ciprofloxacin (Cipro) 400 mg in dextrose 5%  mL, 400 mg, intravenous, q12h, Chana Whelan MD, Stopped at 08/05/25 2207  •  enoxaparin (Lovenox) syringe 40 mg, 40 mg, subcutaneous, q24h, Chana Whelan MD  •  guaiFENesin (Mucinex) 12 hr tablet 600 mg, 600 mg, oral, q12h PRN, Chana  MD Tip  •  hydrALAZINE (Apresoline) injection 10 mg, 10 mg, intravenous, q8h PRN, Chana Whelan MD  •  metroNIDAZOLE (Flagyl) 500 mg in sodium chloride (iso)  mL, 500 mg, intravenous, q8h, Chana Whelan MD, Stopped at 08/06/25 0551  •  morphine injection 2 mg, 2 mg, intravenous, q4h PRN, Chana Whelan MD  •  ondansetron (Zofran) tablet 4 mg, 4 mg, oral, q8h PRN **OR** ondansetron (Zofran) injection 4 mg, 4 mg, intravenous, q8h PRN, Chana Whelan MD  •  pantoprazole (ProtoNix) EC tablet 40 mg, 40 mg, oral, Daily before breakfast, 40 mg at 08/06/25 0602 **OR** pantoprazole (Protonix) injection 40 mg, 40 mg, intravenous, Daily before breakfast, Chana Whelan MD, 40 mg at 08/05/25 0604  •  polyethylene glycol (Glycolax, Miralax) packet 17 g, 17 g, oral, Daily PRN, Chana Whelan MD  [6]  Past Medical History:  Diagnosis Date   • Chronic orthostatic hypotension 05/19/2023   • Dementia    • GERD (gastroesophageal reflux disease)    • Glaucoma    • HTN (hypertension)    • Ischemic optic neuropathy, 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   • Rectal bleeding 05/19/2023   [7]  Past Surgical History:  Procedure Laterality Date   • BLADDER SUSPENSION     • CATARACT EXTRACTION W/  INTRAOCULAR LENS IMPLANT     • SINUS SURGERY     • TONSILLECTOMY     • TOTAL ABDOMINAL HYSTERECTOMY W/ BILATERAL SALPINGOOPHORECTOMY     [8]  Social History  Tobacco Use   • Smoking status: Never   • Smokeless tobacco: Never   Vaping Use   • Vaping status: Never Used   Substance Use Topics   • Alcohol use: Not Currently     Comment: occasional alcohol   • Drug use: Never   [9]  Allergies  Allergen Reactions   • Aspirin Other     upset stomach   • Ibuprofen Unknown     motrin   • Penicillins Hives, Rash and Unknown     tolerated ceftriaxone 2022

## 2025-08-05 NOTE — CONSULTS
Reason For Consult  ERCP, choledocholithiasis    History Of Present Illness  Jessy Masterson is a 89 y.o. female with a PMHx s/f HTN and dementia who presented to [facility] ER on 8/4/2025 on the advice of her PCP for epigastric pain, nausea, vomiting and cholelithiasis/choledocholithiasis on CT scan.  Patient is a poor historian.  Her daughter who is at bedside reports that patient had a syncopal episode in the [place] last Sunday.  Denies fever/chills, hematemesis, coffee-ground emesis, hematochezia, melena, and antithrombotics.  She has been having RUQ abdominal pain for one month on and off.   ER labs are notable for BUN/creatinine 30/1.15, alk phos 518-> 399, -> 214, -> 158, bili total 8-> 4.9, bili direct 3.2, lipase 12, INR 1.1, WBC 9.1, hemoglobin 12.7, platelets 276.    8/04/2025 CT abdomen wo IV contrast  IMPRESSION:  Extensive choledocholithiasis with mild cholelithiasis. There is diffuse intrahepatic and extrahepatic biliary ductal dilatation ending at the stone in the region of the ampulla of Vater. Please see above for details. Recommend ERCP in follow-up.  Cholelithiasis with mild gallbladder dilatation but no adjacent fluid.  Nonspecific hyperdense material in the stomach. This could be ingested medication and/or blood (such as from gastritis). Clinical correlation is needed.  Several parapelvic left renal cysts.  Mild-to-moderate stool throughout the imaged colon.      02/23/2005 Colonoscopy  The colonic mucosa appeared entirely normal.    There were no masses or polyps found.    There were no vascular abnormalities noted.    GI service was consulted for choledocholithiasis and ERCP.     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    Surgical  History  She has a past surgical history that includes Other surgical history (08/07/2020) and Bladder suspension.     Social History  She reports that she has never smoked. She has never used smokeless tobacco. She reports that she does not currently use alcohol. She reports that she does not use drugs.    Family History  Family History[1]     Allergies  Aspirin, Ibuprofen, and Penicillins    Review of Systems  Review of Systems   Gastrointestinal:  Positive for abdominal pain, nausea and vomiting.   Neurological:  Positive for syncope.   All other systems reviewed and are negative.        Physical Exam  Physical Exam  Vitals reviewed.   Constitutional:       Appearance: She is obese.   HENT:      Head: Atraumatic.     Cardiovascular:      Rate and Rhythm: Regular rhythm.      Heart sounds: Normal heart sounds.   Pulmonary:      Effort: Pulmonary effort is normal.      Breath sounds: Normal breath sounds.   Abdominal:      General: There is no distension.      Palpations: Abdomen is soft.      Tenderness: There is abdominal tenderness in the right upper quadrant. There is no guarding or rebound.     Musculoskeletal:         General: Normal range of motion.      Cervical back: Neck supple.     Skin:     General: Skin is warm and dry.     Neurological:      General: No focal deficit present.      Mental Status: She is alert. Mental status is at baseline.     Psychiatric:         Mood and Affect: Mood normal.         Behavior: Behavior normal.           Last Recorded Vitals  Blood pressure 165/78, pulse 84, temperature 36.6 °C (97.9 °F), temperature source Temporal, resp. rate 17, height (!) 1.524 m (5'), weight 74.8 kg (165 lb), SpO2 94%.    Relevant Results    Scheduled medications  Scheduled Medications[2]  Continuous medications  Continuous Medications[3]  PRN medications  PRN Medications[4]      Results for orders placed or performed during the hospital encounter of 08/04/25 (from the past 24 hours)   Troponin I,  High Sensitivity   Result Value Ref Range    Troponin I, High Sensitivity 19 (H) 0 - 13 ng/L   Magnesium   Result Value Ref Range    Magnesium 1.91 1.60 - 2.40 mg/dL   Protime-INR   Result Value Ref Range    Protime 12.6 (H) 9.8 - 12.4 seconds    INR 1.1 0.9 - 1.1   Type And Screen   Result Value Ref Range    ABO TYPE A     Rh TYPE NEG     ANTIBODY SCREEN NEG    ECG 12 lead   Result Value Ref Range    Ventricular Rate 82 BPM    Atrial Rate 82 BPM    DE Interval 158 ms    QRS Duration 82 ms    QT Interval 402 ms    QTC Calculation(Bazett) 469 ms    P Axis 72 degrees    R Axis -2 degrees    T Axis 34 degrees    QRS Count 13 beats    Q Onset 218 ms    P Onset 139 ms    P Offset 188 ms    T Offset 419 ms    QTC Fredericia 446 ms   VERIFY ABO/Rh Group Test   Result Value Ref Range    ABO TYPE A     Rh TYPE NEG    Urinalysis with Reflex Microscopic   Result Value Ref Range    Color, Urine Yellow Light-Yellow, Yellow, Dark-Yellow    Appearance, Urine Clear Clear    Specific Gravity, Urine 1.004 (N) 1.005 - 1.035    pH, Urine 6.0 5.0, 5.5, 6.0, 6.5, 7.0, 7.5, 8.0    Protein, Urine NEGATIVE NEGATIVE, 10 (TRACE), 20 (TRACE) mg/dL    Glucose, Urine Normal Normal mg/dL    Blood, Urine NEGATIVE NEGATIVE mg/dL    Ketones, Urine NEGATIVE NEGATIVE mg/dL    Bilirubin, Urine NEGATIVE NEGATIVE mg/dL    Urobilinogen, Urine Normal Normal mg/dL    Nitrite, Urine NEGATIVE NEGATIVE    Leukocyte Esterase, Urine NEGATIVE NEGATIVE   Basic metabolic panel   Result Value Ref Range    Glucose 99 74 - 99 mg/dL    Sodium 139 136 - 145 mmol/L    Potassium 3.6 3.5 - 5.3 mmol/L    Chloride 107 98 - 107 mmol/L    Bicarbonate 24 21 - 32 mmol/L    Anion Gap 12 10 - 20 mmol/L    Urea Nitrogen 20 6 - 23 mg/dL    Creatinine 0.75 0.50 - 1.05 mg/dL    eGFR 77 >60 mL/min/1.73m*2    Calcium 8.6 8.6 - 10.3 mg/dL   CBC   Result Value Ref Range    WBC 6.4 4.4 - 11.3 x10*3/uL    nRBC 0.0 0.0 - 0.0 /100 WBCs    RBC 3.69 (L) 4.00 - 5.20 x10*6/uL    Hemoglobin 11.7  (L) 12.0 - 16.0 g/dL    Hematocrit 34.2 (L) 36.0 - 46.0 %    MCV 93 80 - 100 fL    MCH 31.7 26.0 - 34.0 pg    MCHC 34.2 32.0 - 36.0 g/dL    RDW 13.8 11.5 - 14.5 %    Platelets 257 150 - 450 x10*3/uL   Hepatic function panel   Result Value Ref Range    Albumin 3.4 3.4 - 5.0 g/dL    Bilirubin, Total 4.9 (H) 0.0 - 1.2 mg/dL    Bilirubin, Direct 3.2 (H) 0.0 - 0.3 mg/dL    Alkaline Phosphatase 399 (H) 33 - 136 U/L     (H) 7 - 45 U/L     (H) 9 - 39 U/L    Total Protein 5.9 (L) 6.4 - 8.2 g/dL    ECG 12 lead  Result Date: 8/5/2025  Sinus rhythm with occasional Premature ventricular complexes Cannot rule out Anterior infarct , age undetermined Abnormal ECG When compared with ECG of 28-MAR-2022 19:55, PREVIOUS ECG IS PRESENT    CT abdomen wo IV contrast  Result Date: 8/4/2025  Interpreted By:  Giuseppe Mixon, STUDY: CT ABDOMEN WO IV CONTRAST;  8/4/2025 11:30 am   INDICATION: 88 y/o   F with  Signs/Symptoms:intense epigastric pain/low appetite.   ,R10.13 Epigastric pain,R11.2 Nausea with vomiting, unspecified   LIMITATIONS: Evaluation of solid organs and vessels is limited without the use of IV contrast. Evaluation of hollow organs is limited without the use of oral contrast..   ACCESSION NUMBER(S): [id number]   ORDERING CLINICIAN: SVEN GONZALEZ   TECHNIQUE: Noncontrast spiral axial images were obtained from the xiphoid down through the iliac crest. Sagittal and coronal reconstruction images were generated. Bone, mediastinal, lung, and liver windows were reviewed. N/A   N/A   COMPARISON: None.   FINDINGS: LUNG BASES: No mass or pneumonia or pleural effusion in either lung base..   LIVER: No hepatomegaly. Liver density was  within the limits of normal. No  liver lesion evident in this  exam.   GALLBLADDER: Borderline gallbladder dilatation at 49 mm diameter. There is a 24 mm diameter partially calcified stone in the gallbladder. No gross gallbladder wall thickening or adjacent fat stranding. d No gallbladder wall  thickening, or adjacent edema.   BILE DUCTS: There is a 14 mm diameter stone in the mid common bile duct, and there is another in the more distal common bile duct measuring 11 mm on image 45, just within the pancreatic head. Just caudal to this is a 3rd stone measuring 9 mm on image 48. There is a 4th stone at the level of the ampulla of Vater measuring 11 mm on image 57.  In addition, the common bile duct is dilated up to 21 mm in diameter, ending at the ampulla. There is also intrahepatic biliary ductal dilatation.   SPLEEN: No splenomegaly. No  splenic mass..   PANCREAS: No pancreatic mass or inflammation, or ductal dilatation.   KIDNEYS/ADRENALS: No adrenal mass or enlargement. There are several left renal parapelvic cysts measuring 25 mm in diameter or less. No calcified stone, hydronephrosis, mass, or perinephric edema in either kidney. No ureteral stone or dilatation.   GREAT VESSELS/RETROPERITONEUM: There is a circumaortic left renal vein, a variant of normal. Very mild aortoiliac mural calcifications. Otherwise, the abdominal aorta and IVC were intact. No suspicious retroperitoneal adenopathy. No suspicious mesenteric adenopathy.   PERITONEUM: No abdominal ascites. No pneumoperitoneum. No peritoneal or mesenteric mass or inflammation in the imaged abdomen.   BOWEL: Small hiatal hernia. The stomach is not optimally distended. The material within the stomach however is relatively hyperdense (61 Hounsfield units of CT density). There is no edema adjacent to the stomach. The imaged small bowel showed no dilatation or small bowel wall thickening. No small-bowel obstruction. Mild-to-moderate stool throughout the imaged colon. More the imaged large bowel showed no colonic wall thickening or large bowel obstruction.  No edema adjacent to the imaged colon.     BONES: No destructive lytic or blastic bone lesion. Multilevel bridging anterior osteophytes in the imaged thoracic spine. There is interfacet hypertrophy  with spurring in the distal lumbar spine.   ABDOMINAL WALL: Unremarkable.       Extensive choledocholithiasis with mild cholelithiasis. There is diffuse intrahepatic and extrahepatic biliary ductal dilatation ending at the stone in the region of the ampulla of Vater. Please see above for details. Recommend ERCP in follow-up.   Cholelithiasis with mild gallbladder dilatation but no adjacent fluid.   Nonspecific hyperdense material in the stomach. This could be ingested medication and/or blood (such as from gastritis). Clinical correlation is needed.   Several parapelvic left renal cysts.   Mild-to-moderate stool throughout the imaged colon.   MACRO: None   Signed by: Giuseppe Mixon 8/4/2025 1:19 PM Dictation workstation:   [id number]     Assessment/Plan   Jessy Masterson is a 89 y.o. female with a PMHx s/f HTN and dementia who presented to [facility] on 8/4/2025 on the advice of her PCP for epigastric pain, nausea, vomiting and cholelithiasis/choledocholithiasis on CT scan.  GI service was consulted for choledocholithiasis and ERCP.  Etiologies include cholelithiasis, choledocholithiasis, cholangitis.     - Keep patient n.p.o.  - Monitor LFTs and bilirubin  - Continue antibiotics  - ERCP today  - Further recommendations pending ERCP results  - General Surgery following.  Plans for LC during this admission  - Supportive care as per primary team  - GI will follow    Case discussed with Dr. Kit Garciasytro Ismael, APRN-CNP         [1] No family history on file.  [2] ciprofloxacin, 400 mg, intravenous, q12h  enoxaparin, 40 mg, subcutaneous, q24h  pantoprazole, 40 mg, oral, Daily before breakfast   Or  pantoprazole, 40 mg, intravenous, Daily before breakfast  [3] lactated Ringer's, 75 mL/hr, Last Rate: 75 mL/hr (08/05/25 0044)  [4] PRN medications: acetaminophen **OR** acetaminophen **OR** acetaminophen, guaiFENesin, hydrALAZINE, morphine, ondansetron **OR** ondansetron, polyethylene glycol

## 2025-08-05 NOTE — PROGRESS NOTES
08/05/25 0920   Discharge Planning   Living Arrangements Children   Support Systems Children   Assistance Needed Patient uses a rollator when outside and a cane while inside   Type of Residence Private residence   Number of Stairs to Enter Residence 1   Number of Stairs Within Residence 0   Do you have animals or pets at home? Yes   Type of Animals or Pets dog and 2 outdoor cats   Home or Post Acute Services None   Expected Discharge Disposition Home   Does the patient need discharge transport arranged? No  (Daughter will transport home)   Financial Resource Strain   How hard is it for you to pay for the very basics like food, housing, medical care, and heating? Not hard   Housing Stability   In the last 12 months, was there a time when you were not able to pay the mortgage or rent on time? N   In the past 12 months, how many times have you moved where you were living? 0   At any time in the past 12 months, were you homeless or living in a shelter (including now)? N   Transportation Needs   In the past 12 months, has lack of transportation kept you from medical appointments or from getting medications? no   In the past 12 months, has lack of transportation kept you from meetings, work, or from getting things needed for daily living? No   Stroke Family Assessment   Stroke Family Assessment Needed No   Intensity of Service   Intensity of Service 0-30 min     Met with patient and her daughter at the bedside to discuss discharge plan.  Patient admitted with choledocholithiasis.  Patient speaks Polish and understands some English.  PLAN/BARRIER: GI consult, ERCP, possible lap dominic  DISP: home  HHC: daughter denies need for home health care  DME: none  O2: none  WOUNDS: none  ADOD: 1-3 days  PCP: Dr. Uriarte  [facility]: [facility]  Kira Palacios RN

## 2025-08-05 NOTE — CARE PLAN
The clinical goals for the shift include pt remains safe      Problem: Pain - Adult  Goal: Verbalizes/displays adequate comfort level or baseline comfort level  Outcome: Progressing     Problem: Safety - Adult  Goal: Free from fall injury  Outcome: Progressing     Problem: Discharge Planning  Goal: Discharge to home or other facility with appropriate resources  Outcome: Progressing     Problem: Chronic Conditions and Co-morbidities  Goal: Patient's chronic conditions and co-morbidity symptoms are monitored and maintained or improved  Outcome: Progressing     Problem: Nutrition  Goal: Nutrient intake appropriate for maintaining nutritional needs  Outcome: Progressing     Problem: Pain  Goal: Takes deep breaths with improved pain control throughout the shift  Outcome: Progressing  Goal: Turns in bed with improved pain control throughout the shift  Outcome: Progressing  Goal: Walks with improved pain control throughout the shift  Outcome: Progressing  Goal: Performs ADL's with improved pain control throughout shift  Outcome: Progressing  Goal: Participates in PT with improved pain control throughout the shift  Outcome: Progressing  Goal: Free from opioid side effects throughout the shift  Outcome: Progressing  Goal: Free from acute confusion related to pain meds throughout the shift  Outcome: Progressing     Problem: Fall/Injury  Goal: Not fall by end of shift  Outcome: Progressing  Goal: Be free from injury by end of the shift  Outcome: Progressing  Goal: Verbalize understanding of personal risk factors for fall in the hospital  Outcome: Progressing  Goal: Verbalize understanding of risk factor reduction measures to prevent injury from fall in the home  Outcome: Progressing  Goal: Use assistive devices by end of the shift  Outcome: Progressing  Goal: Pace activities to prevent fatigue by end of the shift  Outcome: Progressing

## 2025-08-05 NOTE — CONSULTS
Reason For Consult  Choledocholithiasis    History Of Present Illness  Jessy Masterson is a 89 y.o. female with PMH of HTN and dementia who was directed to the ED by her PCP due to choledocholithiasis as discovered on outpatient CT A/P. Patient present with daughter in room. Primarily speaks Polish, daughter aided in providing patient history. Patient has been having N/V and epigastric pain x weeks. PCP initially tried to manage symptoms with PPI's due to suspected PUD. After symptoms progressed, PCP ordered CT A/P and directed them to come to [facility] for further evaluation and management. Currently patient is comfortable. Pain was described as more mild today. Located on the R side of her abdomen. No N/V. Decreased appetite. Daughter has noticed some darkening of the skin. Only abdominal surgery patient has had was hysterectomy decades ago. Not on anticoagulation.     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    Surgical History  She has a past surgical history that includes Other surgical history (08/07/2020) and Bladder suspension.     Social History  She reports that she has never smoked. She has never used smokeless tobacco. She reports that she does not currently use alcohol. She reports that she does not use drugs.    Family History  Family History[1]     Allergies  Aspirin, Ibuprofen, and Penicillins    Review of Systems  A full 10 point ROS was completed. Nothing positive other than what was mentioned in the HPI.    Physical Exam  Constitutional:       Appearance: Normal appearance. She is obese.   HENT:      Head: Normocephalic and atraumatic.      Nose: Nose normal.     Cardiovascular:      Rate and Rhythm: Normal rate and regular rhythm.       Pulses: Normal pulses.   Pulmonary:      Effort: Pulmonary effort is normal.      Breath sounds: Normal breath sounds.   Abdominal:      General: Bowel sounds are normal. There is no distension.      Palpations: Abdomen is soft.      Tenderness: There is abdominal tenderness in the right upper quadrant and right lower quadrant. There is no guarding or rebound.      Comments: Abdomen was non-distended. Bowel sounds were normoactive in all four quadrants. Abdomen was soft. Had tenderness to deep palpation on the R side.     Skin:     General: Skin is warm and dry.     Neurological:      General: No focal deficit present.      Mental Status: She is alert and oriented to person, place, and time.     Psychiatric:         Mood and Affect: Mood normal.         Behavior: Behavior normal.         Thought Content: Thought content normal.         Judgment: Judgment normal.       Last Recorded Vitals  Blood pressure 151/81, pulse 90, temperature 36.7 °C (98.1 °F), resp. rate 16, height (!) 1.524 m (5'), weight 74.8 kg (165 lb), SpO2 97%.    Relevant Results  Scheduled medications  Scheduled Medications[2]  Continuous medications  Continuous Medications[3]  PRN medications  PRN Medications[4]    Results for orders placed or performed during the hospital encounter of 08/04/25 (from the past 24 hours)   Troponin I, High Sensitivity   Result Value Ref Range    Troponin I, High Sensitivity 19 (H) 0 - 13 ng/L   Magnesium   Result Value Ref Range    Magnesium 1.91 1.60 - 2.40 mg/dL   Protime-INR   Result Value Ref Range    Protime 12.6 (H) 9.8 - 12.4 seconds    INR 1.1 0.9 - 1.1   Type And Screen   Result Value Ref Range    ABO TYPE A     Rh TYPE NEG     ANTIBODY SCREEN NEG    VERIFY ABO/Rh Group Test   Result Value Ref Range    ABO TYPE A     Rh TYPE NEG      No orders to display     ASSESSMENT/PLAN:  Jessy Masterson is a 89 y.o. female who was discovered to have choledocholithiasis on CT A/P performed on 8/4/25 with  findings of extensive choledocholithiasis with mild gallbladder dilation but no adjacent fluid. 14 mm diameter stone in the mid common bile duct and another in the more distal common bile duct measuring 11 mm. LFTs were notable for being elevated Alk Phos: 518, ALT: 295, AST: 238, and Bili: 8.0.  - No emergent surgical indications at this time.  - Consulted GI.  - Plan for ERCP prior to surgical interventions.  - Diet: NPO MN.  - Continue filiberto-operative abx for prophylaxis.  - PRN pain control regimen.   - PRN antiemetic.  - Continue maintenance IV fluids.  - Monitor I&Os.  - Trend LFTs with daily labs.    Dispo: Consulted GI for ERCP prior to surgical intervention. Surgery to follow.    I spent 60 minutes in the professional and overall care of this patient.    Saroj Orellana PA-C         [1] No family history on file.  [2] ciprofloxacin, 400 mg, intravenous, q12h  enoxaparin, 40 mg, subcutaneous, q24h  [START ON 8/5/2025] pantoprazole, 40 mg, oral, Daily before breakfast   Or  [START ON 8/5/2025] pantoprazole, 40 mg, intravenous, Daily before breakfast  [3] lactated Ringer's, 75 mL/hr  [4] PRN medications: acetaminophen **OR** acetaminophen **OR** acetaminophen, guaiFENesin, hydrALAZINE, morphine, ondansetron **OR** ondansetron, polyethylene glycol

## 2025-08-05 NOTE — CARE PLAN
The patient's goals for the shift include      The clinical goals for the shift include safety, comfort      Problem: Pain - Adult  Goal: Verbalizes/displays adequate comfort level or baseline comfort level  Outcome: Progressing     Problem: Safety - Adult  Goal: Free from fall injury  Outcome: Progressing

## 2025-08-05 NOTE — PROGRESS NOTES
Jessy Masterson is a 89 y.o. female     Patient seen this morning  Pain under control  Scheduled for ERCP followed by surgery    Review of Systems     Constitutional: no fever, no chills, not feeling poorly, not feeling tired   Cardiovascular: no chest pain   Respiratory: no cough, wheezing or shortness of breath a  Gastrointestinal: no abdominal pain, no constipation, no melena, no nausea, no diarrhea, no vomiting and no blood in stools.   Neurological: no headache,   All other systems have been reviewed and are negative for complaint.       Vitals:    08/05/25 1548   BP: 175/79   Pulse:    Resp: 16   Temp: 36.6 °C (97.8 °F)   SpO2: 97%        Scheduled medications  Scheduled Medications[1]  Continuous medications  Continuous Medications[2]  PRN medications  PRN Medications[3]    Lab Review   Results from last 7 days   Lab Units 08/05/25  0537 08/04/25  1300   WBC AUTO x10*3/uL 6.4 9.1   HEMOGLOBIN g/dL 11.7* 12.7   HEMATOCRIT % 34.2* 39.2   PLATELETS AUTO x10*3/uL 257 276     Results from last 7 days   Lab Units 08/05/25  0537 08/04/25  1300   SODIUM mmol/L 139 134*   POTASSIUM mmol/L 3.6 4.3   CHLORIDE mmol/L 107 99   CO2 mmol/L 24 21   BUN mg/dL 20 30*   CREATININE mg/dL 0.75 1.15*   CALCIUM mg/dL 8.6 8.7   PROTEIN TOTAL g/dL 5.9* 6.4   BILIRUBIN TOTAL mg/dL 4.9* 8.0*   ALK PHOS U/L 399* 518*   ALT U/L 214* 295*   AST U/L 158* 238*   GLUCOSE mg/dL 99 97     Results from last 7 days   Lab Units 08/04/25  1728   TROPHS ng/L 19*        No orders to display         Physical Exam    Constitutional   General appearance: Alert and in no acute distress.   Pulmonary   Respiratory assessment: No respiratory distress, normal respiratory rhythm and effort.    Auscultation of Lungs: Clear bilateral breath sounds.   Cardiovascular   Auscultation of heart: Apical pulse normal, heart rate and rhythm normal, normal S1 and S2, no murmurs and no pericardial rub.    Exam for edema: No peripheral edema.   Abdomen    Abdominal Exam: No bruits, normal bowel sounds, soft, non-tender, no abdominal mass palpated.    Liver and Spleen exam: No hepato-splenomegaly.   Musculoskeletal     Inspection/palpation of joints, bones and muscles: No joint swelling. Normal movement of all extremities.   Neurologic   Cranial nerves: Nerves 2-12 were intact, no focal neuro defects.         Assessment/Plan      #Acute choledocholithiasis  #Transaminitis  #Acute kidney injury  Starting IV pain medications antiemetics  Continue fluids and antibiotics  Kidney functions have improved  Consulting surgery for cholecystectomy  Consulting GI for ERCP     #Hypertension  Holding blood pressure medicines  Will use IV hydralazine as needed       [1] ciprofloxacin, 400 mg, intravenous, q12h  enoxaparin, 40 mg, subcutaneous, q24h  lisinopril, 40 mg, oral, Once  metroNIDAZOLE, 500 mg, intravenous, q8h  pantoprazole, 40 mg, oral, Daily before breakfast   Or  pantoprazole, 40 mg, intravenous, Daily before breakfast    [2] lactated Ringer's, 75 mL/hr, Last Rate: 75 mL/hr (08/05/25 1350)    [3] PRN medications: acetaminophen **OR** acetaminophen **OR** acetaminophen, guaiFENesin, hydrALAZINE, morphine, ondansetron **OR** ondansetron, polyethylene glycol

## 2025-08-05 NOTE — CONSULTS
"Nutrition Consult Note  Nutrition Assessment      Reason for Assessment: Admission nursing screening    Jessy Masterson is a 89 y.o. year old female patient with Choledocholithiasis [K80.50]    referred for MST-2 wt loss and poor appetite   .   Chart reviewed and pt visited.  Medical History[1]    Per chart review:  -Pt admitted c/o epigastric pain [id number]  -Nausea  -Gallstone blockage  -ERCP today; no surgical interview yet   -Possible lap dominic on this admission    Pt sleeping at time of visit    Scheduled medications  Scheduled Medications[2]  Continuous medications  Continuous Medications[3]  PRN medications  PRN Medications[4]    Nutrition Significant Labs:  BMP Trend:   Results from last 7 days   Lab Units 08/05/25 0537 08/04/25  1300   GLUCOSE mg/dL 99 97   CALCIUM mg/dL 8.6 8.7   SODIUM mmol/L 139 134*   POTASSIUM mmol/L 3.6 4.3   CO2 mmol/L 24 21   CHLORIDE mmol/L 107 99   BUN mg/dL 20 30*   CREATININE mg/dL 0.75 1.15*    , BG POCT trend:    , Liver Function Trend:   Results from last 7 days   Lab Units 08/05/25 0537 08/04/25  1300   ALK PHOS U/L 399* 518*   AST U/L 158* 238*   ALT U/L 214* 295*   BILIRUBIN TOTAL mg/dL 4.9* 8.0*    , Renal Lab Trend:   Results from last 7 days   Lab Units 08/05/25 0537 08/04/25 1728 08/04/25  1300   POTASSIUM mmol/L 3.6  --  4.3   SODIUM mmol/L 139  --  134*   MAGNESIUM mg/dL  --  1.91  --    EGFR mL/min/1.73m*2 77  --  46*   BUN mg/dL 20  --  30*   CREATININE mg/dL 0.75  --  1.15*    , TPN/PPN Labs:   Results from last 7 days   Lab Units 08/05/25 0537 08/04/25 1728 08/04/25  1300   GLUCOSE mg/dL 99  --  97   POTASSIUM mmol/L 3.6  --  4.3   MAGNESIUM mg/dL  --  1.91  --    SODIUM mmol/L 139  --  134*   CHLORIDE mmol/L 107  --  99   ALT U/L 214*  --  295*   AST U/L 158*  --  238*   ALK PHOS U/L 399*  --  518*   BILIRUBIN TOTAL mg/dL 4.9*  --  8.0*    , Lipid Panel: No results found for: \"CHOL\", \"HDL\", \"CHHDL\", \"LDLF\", \"VLDL\", \"TRIG\" , Vit D: No results " "found for: \"VITD25\" , Vit B12:   Lab Results   Component Value Date    [id number] 626 06/26/2025    , Iron Panel: No results found for: \"IRON\", \"TIBC\", \"FERRITIN\" , Folate:   Lab Results   Component Value Date    FOLATE 15.5 06/07/2024        Dietary Orders (From admission, onward)       Start     Ordered    08/06/25 0001  NPO Diet Except: Ice chips, Sips of clear liquids, Sips with meds; Effective midnight  Diet effective midnight        Question Answer Comment   Except: Ice chips    Except: Sips of clear liquids    Except: Sips with meds        08/05/25 1620 08/05/25 1621  Adult diet Fat restricted 60 gm  Diet effective now        Question:  Diet type  Answer:  Fat restricted 60 gm    08/05/25 1620 08/04/25 2040  May Participate in Room Service  ( ROOM SERVICE MAY PARTICIPATE)  Once        Question:  .  Answer:  Yes    08/04/25 2039                  History:  Energy Intake: Poor < 50 %  Food and Nutrient History: NPO x2days    Anthropometrics:  Height: (!) 152.4 cm (5')  Weight: 74.8 kg (165 lb)  BMI (Calculated): 32.22       Wt Readings from Last 5 Encounters:  08/05/25 80.0 kg (176.1 lb)- bedscale today  08/04/25 74.8 kg (165 lb)  06/24/25 75.8 kg (167 lb)  12/12/24 78.2 kg (172 lb 4.8 oz)- office visit  06/07/24 75.9 kg (167 lb 4.8 oz)    Significant Weight Loss: No    Energy Needs:  Height: (!) 152.4 cm (5')  Temp: 36.6 °C (97.8 °F)    Total Energy Estimated Needs in 24 hours (kCal): 1500 kCal  Energy Estimated Needs per kg Body Weight in 24 hours (kCal/kg): 1650 kCal/kg  Method for Estimating Needs: 20-22kcal/kg    Total Protein Estimated Needs in 24 Hours (g): 70 g  Protein Estimated Needs per kg Body Weight in 24 Hours (g/kg): 90 g/kg  Method for Estimating 24 Hour Protein Needs: 1.5-2.0g/kg (IBW)    Method for Estimating 24 Hour Fluid Needs: 1mL/kcal or MD recommendations       Nutrition Focused Physical Findings:  Orbital Fat Pads: Mild-Moderate (slight dark circles and slight hollowing) (Visual- " pt sleeping)  Buccal Fat Pads: Well nourished (full, rounded cheeks)    Temporalis: Well nourished (well-defined muscle)    Edema: none       Skin: Negative     Nutrition Diagnosis        Patient has Nutrition Diagnosis: Yes  Nutrition Diagnosis 1: Inadequate protein energy intake  Diagnosis Status (1): New  Related to (1): acute condition  As Evidenced by (1): NPO x2days       Nutrition Interventions/Recommendations   Individualized Nutrition Prescription Provided for : Pt NPO x2days. Should NPO be >5days, consider alternate route for nutrition. Diet just advanced to low fat diet.    Food and/or Nutrient Delivery Interventions  Meals and Snacks: General healthful diet, Fat-modified diet  Goal: Tolerate diet; >75% consumed     Education Documentation  N/A    Nutrition Monitoring and Evaluation   Food and Nutrient Related History  Estimated Energy Intake: Energy intake greater or equal to 75% of estimated energy needs    Fluid Intake: Estimated fluid intake    Intake / Amount of food: Meets > 75% estimated energy needs, Consumes at least 75% or more of meals/snacks/supplements    Anthropometrics: Body Composition/Growth/Weight History  Body Weight: Body weight - Maintain stable weight    Biochemical Data, Medical Tests and Procedures  Electrolyte and Renal Panel: Other (Comment), Sodium  Criteria: As clinically indicated    Gastrointestinal Profile: Other (Comment), Alkaline phosphatase, Alanine aminotransferase (ALT), Aspartate aminotransferase (AST), Bilirubin, total  Criteria: As clinically indicated    Criteria: As clinically indicated    Nutritional Anemia Profile: Other (Comment)  Criteria: As clinically indicated    Vitamin Profile: Other (Comment)  Criteria: As clinically indicated    Nutrition Focused Physical Findings  Adipose Finding: Loss of subcutaneous fat    Bones Finding: Other (Comment)    Digestive System Finding: Vomiting, Nausea, Diarrhea, Constipation, Abdominal pain, Loose stool, Abdominal  bloating       Skin Finding: Promote intact skin - Promote skin integrity    Time Spent (min): 45 minutes  Last Date of Nutrition Visit: 08/05/25  Nutrition Follow-Up Needed?: Dietitian to reassess per policy  Follow up Comment: NEFTALY Morgan            [1]   Past Medical History:  Diagnosis Date    Chronic orthostatic hypotension 05/19/2023    Dementia     GERD (gastroesophageal reflux disease)     Glaucoma     HTN (hypertension)     Ischemic optic neuropathy, 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

## 2025-08-05 NOTE — CONSULTS
Patient is a 89-year-old female with multiple medical conditions who was admitted to the with nausea, vomiting and epigastric abdominal pain she has had for some time now.  She speaks limited English and translation is provided by her daughter.  Apparently she has been having some epigastric pain for the past several weeks.  It was particularly bad on Sunday and she sought treatment with her doctor ordered a CT scan that shows gallstones and bile duct stones.    Workup has included a CT scan that shows large gallstone without evidence of acute cholecystitis.  She is noted to have multiple stones within the common bile duct.    Comfortable, offers no complaints    PMH: Orthostatic hypotension, ischemic optic neuropathy, low back pain, HTN, mild dementia    PSH: Bladder suspension, hysterectomy      Exam  Comfortable  /78   Pulse 84   Temp 36.6 °C (97.9 °F) (Temporal)   Resp 17   Ht (!) 1.524 m (5')   Wt 74.8 kg (165 lb)   SpO2 94%   BMI 32.22 kg/m²   Abdomen soft, mild epigastric tenderness    === 08/04/25 ===    CT ABDOMEN WO IV CONTRAST    - Impression -  Extensive choledocholithiasis with mild cholelithiasis. There is  diffuse intrahepatic and extrahepatic biliary ductal dilatation  ending at the stone in the region of the ampulla of Vater. Please see  above for details. Recommend ERCP in follow-up.    Cholelithiasis with mild gallbladder dilatation but no adjacent fluid.    Nonspecific hyperdense material in the stomach. This could be  ingested medication and/or blood (such as from gastritis). Clinical  correlation is needed.    Several parapelvic left renal cysts.    Mild-to-moderate stool throughout the imaged colon.       Lab Results   Component Value Date    GLUCOSE 99 08/05/2025     08/05/2025    K 3.6 08/05/2025     08/05/2025    CO2 24 08/05/2025    ANIONGAP 12 08/05/2025    BUN 20 08/05/2025    CREATININE 0.75 08/05/2025    EGFR 77 08/05/2025    CALCIUM 8.6 08/05/2025    ALBUMIN  3.4 08/05/2025    ALKPHOS 399 (H) 08/05/2025    PROT 5.9 (L) 08/05/2025     (H) 08/05/2025    BILITOT 4.9 (H) 08/05/2025     (H) 08/05/2025       Impression: Choledocholithiasis, Lidia lith cholelithiasis.    GI to see for consideration of ERCP.    Once the duct has been cleared we will likely take to surgery this admission for laparoscopic cholecystectomy

## 2025-08-06 ENCOUNTER — APPOINTMENT (OUTPATIENT)
Dept: RADIOLOGY | Facility: HOSPITAL | Age: 89
DRG: 418 | End: 2025-08-06
Payer: MEDICARE

## 2025-08-06 ENCOUNTER — APPOINTMENT (OUTPATIENT)
Dept: GASTROENTEROLOGY | Facility: HOSPITAL | Age: 89
DRG: 418 | End: 2025-08-06
Payer: MEDICARE

## 2025-08-06 PROBLEM — D64.9 ANEMIA: Status: ACTIVE | Noted: 2025-08-06

## 2025-08-06 PROBLEM — R79.89 ELEVATED LIVER FUNCTION TESTS: Status: ACTIVE | Noted: 2025-08-06

## 2025-08-06 LAB
ALBUMIN SERPL BCP-MCNC: 3.2 G/DL (ref 3.4–5)
ALP SERPL-CCNC: 356 U/L (ref 33–136)
ALT SERPL W P-5'-P-CCNC: 153 U/L (ref 7–45)
ANION GAP SERPL CALC-SCNC: 14 MMOL/L (ref 10–20)
AST SERPL W P-5'-P-CCNC: 90 U/L (ref 9–39)
BILIRUB SERPL-MCNC: 2.6 MG/DL (ref 0–1.2)
BUN SERPL-MCNC: 14 MG/DL (ref 6–23)
CALCIUM SERPL-MCNC: 8.3 MG/DL (ref 8.6–10.3)
CHLORIDE SERPL-SCNC: 106 MMOL/L (ref 98–107)
CO2 SERPL-SCNC: 22 MMOL/L (ref 21–32)
CREAT SERPL-MCNC: 0.74 MG/DL (ref 0.5–1.05)
EGFRCR SERPLBLD CKD-EPI 2021: 78 ML/MIN/1.73M*2
ERYTHROCYTE [DISTWIDTH] IN BLOOD BY AUTOMATED COUNT: 13.9 % (ref 11.5–14.5)
GLUCOSE SERPL-MCNC: 104 MG/DL (ref 74–99)
HCT VFR BLD AUTO: 35.5 % (ref 36–46)
HGB BLD-MCNC: 12 G/DL (ref 12–16)
MCH RBC QN AUTO: 32.2 PG (ref 26–34)
MCHC RBC AUTO-ENTMCNC: 33.8 G/DL (ref 32–36)
MCV RBC AUTO: 95 FL (ref 80–100)
NRBC BLD-RTO: 0 /100 WBCS (ref 0–0)
PLATELET # BLD AUTO: 277 X10*3/UL (ref 150–450)
POTASSIUM SERPL-SCNC: 3.6 MMOL/L (ref 3.5–5.3)
PROT SERPL-MCNC: 5.7 G/DL (ref 6.4–8.2)
RBC # BLD AUTO: 3.73 X10*6/UL (ref 4–5.2)
SODIUM SERPL-SCNC: 138 MMOL/L (ref 136–145)
WBC # BLD AUTO: 5.5 X10*3/UL (ref 4.4–11.3)

## 2025-08-06 PROCEDURE — 43277 ERCP EA DUCT/AMPULLA DILATE: CPT | Performed by: INTERNAL MEDICINE

## 2025-08-06 PROCEDURE — 2500000004 HC RX 250 GENERAL PHARMACY W/ HCPCS (ALT 636 FOR OP/ED): Performed by: NURSE ANESTHETIST, CERTIFIED REGISTERED

## 2025-08-06 PROCEDURE — 36415 COLL VENOUS BLD VENIPUNCTURE: CPT

## 2025-08-06 PROCEDURE — 84075 ASSAY ALKALINE PHOSPHATASE: CPT

## 2025-08-06 PROCEDURE — A43264 PR ERCP REMOVE CALCULI/DEBRIS BILIARY/PANCREAS DUCT: Performed by: ANESTHESIOLOGY

## 2025-08-06 PROCEDURE — A43264 PR ERCP REMOVE CALCULI/DEBRIS BILIARY/PANCREAS DUCT: Performed by: NURSE ANESTHETIST, CERTIFIED REGISTERED

## 2025-08-06 PROCEDURE — 2500000004 HC RX 250 GENERAL PHARMACY W/ HCPCS (ALT 636 FOR OP/ED): Performed by: INTERNAL MEDICINE

## 2025-08-06 PROCEDURE — 99100 ANES PT EXTEME AGE<1 YR&>70: CPT | Performed by: ANESTHESIOLOGY

## 2025-08-06 PROCEDURE — 3700000002 HC GENERAL ANESTHESIA TIME - EACH INCREMENTAL 1 MINUTE: Performed by: INTERNAL MEDICINE

## 2025-08-06 PROCEDURE — 43264 ERCP REMOVE DUCT CALCULI: CPT | Performed by: INTERNAL MEDICINE

## 2025-08-06 PROCEDURE — 3700000001 HC GENERAL ANESTHESIA TIME - INITIAL BASE CHARGE: Performed by: INTERNAL MEDICINE

## 2025-08-06 PROCEDURE — 1200000002 HC GENERAL ROOM WITH TELEMETRY DAILY

## 2025-08-06 PROCEDURE — C1726 CATH, BAL DIL, NON-VASCULAR: HCPCS | Performed by: INTERNAL MEDICINE

## 2025-08-06 PROCEDURE — 2500000001 HC RX 250 WO HCPCS SELF ADMINISTERED DRUGS (ALT 637 FOR MEDICARE OP): Performed by: INTERNAL MEDICINE

## 2025-08-06 PROCEDURE — 2550000001 HC RX 255 CONTRASTS: Performed by: INTERNAL MEDICINE

## 2025-08-06 PROCEDURE — 2500000004 HC RX 250 GENERAL PHARMACY W/ HCPCS (ALT 636 FOR OP/ED): Performed by: ANESTHESIOLOGY

## 2025-08-06 PROCEDURE — C1769 GUIDE WIRE: HCPCS | Performed by: INTERNAL MEDICINE

## 2025-08-06 PROCEDURE — 99231 SBSQ HOSP IP/OBS SF/LOW 25: CPT | Performed by: INTERNAL MEDICINE

## 2025-08-06 PROCEDURE — 2720000007 HC OR 272 NO HCPCS: Performed by: INTERNAL MEDICINE

## 2025-08-06 PROCEDURE — 2500000005 HC RX 250 GENERAL PHARMACY W/O HCPCS: Performed by: NURSE ANESTHETIST, CERTIFIED REGISTERED

## 2025-08-06 PROCEDURE — 85027 COMPLETE CBC AUTOMATED: CPT | Performed by: INTERNAL MEDICINE

## 2025-08-06 PROCEDURE — 7100000001 HC RECOVERY ROOM TIME - INITIAL BASE CHARGE: Performed by: INTERNAL MEDICINE

## 2025-08-06 PROCEDURE — 7100000002 HC RECOVERY ROOM TIME - EACH INCREMENTAL 1 MINUTE: Performed by: INTERNAL MEDICINE

## 2025-08-06 RX ORDER — PROPOFOL 10 MG/ML
INJECTION, EMULSION INTRAVENOUS AS NEEDED
Status: DISCONTINUED | OUTPATIENT
Start: 2025-08-06 | End: 2025-08-06

## 2025-08-06 RX ORDER — LIDOCAINE HYDROCHLORIDE 10 MG/ML
0.1 INJECTION, SOLUTION EPIDURAL; INFILTRATION; INTRACAUDAL; PERINEURAL ONCE
Status: CANCELLED | OUTPATIENT
Start: 2025-08-06 | End: 2025-08-06

## 2025-08-06 RX ORDER — ROCURONIUM BROMIDE 10 MG/ML
INJECTION, SOLUTION INTRAVENOUS AS NEEDED
Status: DISCONTINUED | OUTPATIENT
Start: 2025-08-06 | End: 2025-08-06

## 2025-08-06 RX ORDER — METOCLOPRAMIDE HYDROCHLORIDE 5 MG/ML
10 INJECTION INTRAMUSCULAR; INTRAVENOUS ONCE AS NEEDED
Status: DISCONTINUED | OUTPATIENT
Start: 2025-08-06 | End: 2025-08-08

## 2025-08-06 RX ORDER — SODIUM CHLORIDE, SODIUM LACTATE, POTASSIUM CHLORIDE, CALCIUM CHLORIDE 600; 310; 30; 20 MG/100ML; MG/100ML; MG/100ML; MG/100ML
100 INJECTION, SOLUTION INTRAVENOUS CONTINUOUS
Status: ACTIVE | OUTPATIENT
Start: 2025-08-06 | End: 2025-08-06

## 2025-08-06 RX ORDER — SODIUM CHLORIDE, SODIUM LACTATE, POTASSIUM CHLORIDE, CALCIUM CHLORIDE 600; 310; 30; 20 MG/100ML; MG/100ML; MG/100ML; MG/100ML
100 INJECTION, SOLUTION INTRAVENOUS CONTINUOUS
Status: CANCELLED | OUTPATIENT
Start: 2025-08-06 | End: 2025-08-06

## 2025-08-06 RX ORDER — MEPERIDINE HYDROCHLORIDE 25 MG/ML
12.5 INJECTION INTRAMUSCULAR; INTRAVENOUS; SUBCUTANEOUS EVERY 10 MIN PRN
Status: CANCELLED | OUTPATIENT
Start: 2025-08-06

## 2025-08-06 RX ORDER — ONDANSETRON HYDROCHLORIDE 2 MG/ML
4 INJECTION, SOLUTION INTRAVENOUS ONCE AS NEEDED
Status: CANCELLED | OUTPATIENT
Start: 2025-08-06

## 2025-08-06 RX ORDER — LIDOCAINE HYDROCHLORIDE 20 MG/ML
INJECTION, SOLUTION INFILTRATION; PERINEURAL AS NEEDED
Status: DISCONTINUED | OUTPATIENT
Start: 2025-08-06 | End: 2025-08-06

## 2025-08-06 RX ORDER — LIDOCAINE HYDROCHLORIDE 10 MG/ML
0.1 INJECTION, SOLUTION EPIDURAL; INFILTRATION; INTRACAUDAL; PERINEURAL ONCE
Status: DISCONTINUED | OUTPATIENT
Start: 2025-08-06 | End: 2025-08-08 | Stop reason: HOSPADM

## 2025-08-06 RX ORDER — OXYCODONE HYDROCHLORIDE 5 MG/1
5 TABLET ORAL EVERY 4 HOURS PRN
Status: DISCONTINUED | OUTPATIENT
Start: 2025-08-06 | End: 2025-08-08 | Stop reason: HOSPADM

## 2025-08-06 RX ORDER — HYDRALAZINE HYDROCHLORIDE 20 MG/ML
10 INJECTION INTRAMUSCULAR; INTRAVENOUS ONCE
Status: COMPLETED | OUTPATIENT
Start: 2025-08-06 | End: 2025-08-06

## 2025-08-06 RX ORDER — OXYCODONE HYDROCHLORIDE 5 MG/1
5 TABLET ORAL EVERY 4 HOURS PRN
Refills: 0 | Status: CANCELLED | OUTPATIENT
Start: 2025-08-06

## 2025-08-06 RX ORDER — METOCLOPRAMIDE HYDROCHLORIDE 5 MG/ML
10 INJECTION INTRAMUSCULAR; INTRAVENOUS ONCE AS NEEDED
Status: CANCELLED | OUTPATIENT
Start: 2025-08-06

## 2025-08-06 RX ORDER — ONDANSETRON HYDROCHLORIDE 2 MG/ML
4 INJECTION, SOLUTION INTRAVENOUS ONCE AS NEEDED
Status: DISCONTINUED | OUTPATIENT
Start: 2025-08-06 | End: 2025-08-08

## 2025-08-06 RX ORDER — HYDROMORPHONE HYDROCHLORIDE 0.2 MG/ML
0.2 INJECTION INTRAMUSCULAR; INTRAVENOUS; SUBCUTANEOUS EVERY 5 MIN PRN
Status: DISCONTINUED | OUTPATIENT
Start: 2025-08-06 | End: 2025-08-08

## 2025-08-06 RX ADMIN — DEXAMETHASONE SODIUM PHOSPHATE 4 MG: 4 INJECTION, SOLUTION INTRAMUSCULAR; INTRAVENOUS at 17:55

## 2025-08-06 RX ADMIN — PANTOPRAZOLE SODIUM 40 MG: 40 TABLET, DELAYED RELEASE ORAL at 06:02

## 2025-08-06 RX ADMIN — CARBOXYMETHYLCELLULOSE SODIUM 2 DROP: 5 SOLUTION/ DROPS OPHTHALMIC at 17:35

## 2025-08-06 RX ADMIN — CIPROFLOXACIN 400 MG: 2 INJECTION, SOLUTION INTRAVENOUS at 09:47

## 2025-08-06 RX ADMIN — IOHEXOL 10 ML: 300 INJECTION, SOLUTION INTRAVENOUS at 18:08

## 2025-08-06 RX ADMIN — SODIUM CHLORIDE, POTASSIUM CHLORIDE, SODIUM LACTATE AND CALCIUM CHLORIDE: 600; 310; 30; 20 INJECTION, SOLUTION INTRAVENOUS at 17:29

## 2025-08-06 RX ADMIN — ENOXAPARIN SODIUM 40 MG: 100 INJECTION SUBCUTANEOUS at 20:50

## 2025-08-06 RX ADMIN — HYDRALAZINE HYDROCHLORIDE 10 MG: 20 INJECTION INTRAMUSCULAR; INTRAVENOUS at 09:47

## 2025-08-06 RX ADMIN — METRONIDAZOLE 500 MG: 500 INJECTION, SOLUTION INTRAVENOUS at 20:51

## 2025-08-06 RX ADMIN — ROCURONIUM BROMIDE 40 MG: 10 INJECTION, SOLUTION INTRAVENOUS at 17:33

## 2025-08-06 RX ADMIN — ONDANSETRON 4 MG: 2 INJECTION INTRAMUSCULAR; INTRAVENOUS at 17:55

## 2025-08-06 RX ADMIN — HYDRALAZINE HYDROCHLORIDE 10 MG: 20 INJECTION INTRAMUSCULAR; INTRAVENOUS at 18:41

## 2025-08-06 RX ADMIN — METRONIDAZOLE 500 MG: 500 INJECTION, SOLUTION INTRAVENOUS at 13:50

## 2025-08-06 RX ADMIN — LIDOCAINE HYDROCHLORIDE 100 MG: 20 INJECTION, SOLUTION INFILTRATION; PERINEURAL at 17:33

## 2025-08-06 RX ADMIN — METRONIDAZOLE 500 MG: 500 INJECTION, SOLUTION INTRAVENOUS at 04:53

## 2025-08-06 RX ADMIN — SUGAMMADEX 200 MG: 100 INJECTION, SOLUTION INTRAVENOUS at 18:06

## 2025-08-06 RX ADMIN — CIPROFLOXACIN 400 MG: 2 INJECTION, SOLUTION INTRAVENOUS at 23:06

## 2025-08-06 RX ADMIN — PROPOFOL 150 MG: 10 INJECTION, EMULSION INTRAVENOUS at 17:33

## 2025-08-06 ASSESSMENT — PAIN SCALES - GENERAL
PAINLEVEL_OUTOF10: 0 - NO PAIN
PAINLEVEL_OUTOF10: 3
PAINLEVEL_OUTOF10: 0 - NO PAIN
PAINLEVEL_OUTOF10: 0 - NO PAIN
PAINLEVEL_OUTOF10: 7
PAINLEVEL_OUTOF10: 0 - NO PAIN
PAINLEVEL_OUTOF10: 7

## 2025-08-06 ASSESSMENT — COGNITIVE AND FUNCTIONAL STATUS - GENERAL
TOILETING: A LITTLE
DRESSING REGULAR LOWER BODY CLOTHING: A LITTLE
WALKING IN HOSPITAL ROOM: A LITTLE
CLIMB 3 TO 5 STEPS WITH RAILING: A LOT
MOVING FROM LYING ON BACK TO SITTING ON SIDE OF FLAT BED WITH BEDRAILS: A LITTLE
STANDING UP FROM CHAIR USING ARMS: A LITTLE
TURNING FROM BACK TO SIDE WHILE IN FLAT BAD: A LITTLE
HELP NEEDED FOR BATHING: A LITTLE
MOBILITY SCORE: 17
DAILY ACTIVITIY SCORE: 19
PERSONAL GROOMING: A LITTLE
DRESSING REGULAR UPPER BODY CLOTHING: A LITTLE
MOVING TO AND FROM BED TO CHAIR: A LITTLE

## 2025-08-06 ASSESSMENT — PAIN - FUNCTIONAL ASSESSMENT
PAIN_FUNCTIONAL_ASSESSMENT: 0-10
PAIN_FUNCTIONAL_ASSESSMENT: UNABLE TO SELF-REPORT
PAIN_FUNCTIONAL_ASSESSMENT: 0-10

## 2025-08-06 NOTE — PROGRESS NOTES
Pharmacy Medication History     Source of Information: PATIENT    Additional concerns with the patient's PTA list.     Notified Provider via Haiku : No-NO CHANGES    The following updates were made to the Prior to Admission medication list:     Medications ADDED:   N/A  Medications CHANGED:  N/A  Medications REMOVED:   N/A  Medications NOT TAKING:   N/A    Allergy reviewed : Yes    Meds 2 Beds : N/A    Outpatient pharmacy confirmed and updated in chart : Yes    Pharmacy name: [id number]- [place], [place]    The list below reflectives the updated PTA list. Please review each medication in order reconciliation for additional clarification and justification.    Prior to Admission Medications   Prescriptions Last Dose      cholecalciferol (Vitamin D-3) 25 MCG (1000 UT) capsule 8/4/2025      Sig: Take by mouth.   cyanocobalamin (Vitamin B-12) 1,000 mcg tablet 8/4/2025      Sig: Take 1 tablet (1,000 mcg) by mouth once daily.   latanoprost (Xalatan) 0.005 % ophthalmic solution 8/4/2025      Sig: instill one drop in each eye at bedtime   lisinopril 40 mg tablet 8/4/2025      Sig: TAKE ONE TABLET BY MOUTH ONCE A DAY   omeprazole (PriLOSEC) 40 mg DR capsule Past Month      Sig: Take 1 capsule (40 mg) by mouth once daily in the morning. Take before meals. Do not crush or chew.      Facility-Administered Medications: None       The list below reflectives the updated allergy list. Please review each documented allergy for additional clarification and justification.    Allergies   Allergen Reactions    Aspirin Other     upset stomach    Ibuprofen Unknown     motrin    Penicillins Hives, Rash and Unknown     tolerated ceftriaxone 2022 08/06/25 at 3:27 PM - Cristela Khan

## 2025-08-06 NOTE — CARE PLAN
The patient's goals for the shift include      The clinical goals for the shift include remain free of falls/injury manage pain        Problem: Pain - Adult  Goal: Verbalizes/displays adequate comfort level or baseline comfort level  Outcome: Progressing     Problem: Safety - Adult  Goal: Free from fall injury  Outcome: Progressing     Problem: Discharge Planning  Goal: Discharge to home or other facility with appropriate resources  Outcome: Progressing     Problem: Chronic Conditions and Co-morbidities  Goal: Patient's chronic conditions and co-morbidity symptoms are monitored and maintained or improved  Outcome: Progressing     Problem: Nutrition  Goal: Nutrient intake appropriate for maintaining nutritional needs  Outcome: Progressing     Problem: Pain  Goal: Takes deep breaths with improved pain control throughout the shift  Outcome: Progressing  Goal: Turns in bed with improved pain control throughout the shift  Outcome: Progressing  Goal: Walks with improved pain control throughout the shift  Outcome: Progressing  Goal: Performs ADL's with improved pain control throughout shift  Outcome: Progressing  Goal: Participates in PT with improved pain control throughout the shift  Outcome: Progressing  Goal: Free from opioid side effects throughout the shift  Outcome: Progressing  Goal: Free from acute confusion related to pain meds throughout the shift  Outcome: Progressing     Problem: Fall/Injury  Goal: Not fall by end of shift  Outcome: Progressing  Goal: Be free from injury by end of the shift  Outcome: Progressing  Goal: Verbalize understanding of personal risk factors for fall in the hospital  Outcome: Progressing  Goal: Verbalize understanding of risk factor reduction measures to prevent injury from fall in the home  Outcome: Progressing  Goal: Use assistive devices by end of the shift  Outcome: Progressing  Goal: Pace activities to prevent fatigue by end of the shift  Outcome: Progressing

## 2025-08-06 NOTE — ANESTHESIA POSTPROCEDURE EVALUATION
Patient: Jessy Masterson    Procedure Summary       Date: 08/06/25 Room / Location: [facility]    Anesthesia Start: 1729 Anesthesia Stop: 1818    Procedure: ERCP Diagnosis: Choledocholithiasis    Scheduled Providers: Sherman Florian DO; Douglas Cerna MD Responsible Provider: Orion Saldana MD    Anesthesia Type: general ASA Status: 3            Anesthesia Type: general    Vitals Value Taken Time   /66 08/06/25 18:47   Temp 36.5 °C (97.7 °F) 08/06/25 18:12   Pulse 99 08/06/25 18:48   Resp 20 08/06/25 18:30   SpO2 96 % 08/06/25 18:48   Vitals shown include unfiled device data.    Anesthesia Post Evaluation    Patient location during evaluation: PACU  Patient participation: complete - patient participated  Level of consciousness: awake  Pain management: adequate  Airway patency: patent  Cardiovascular status: acceptable  Respiratory status: acceptable  Hydration status: acceptable  Postoperative Nausea and Vomiting: none        No notable events documented.

## 2025-08-06 NOTE — ANESTHESIA PROCEDURE NOTES
Airway  Date/Time: 8/6/2025 5:36 PM  Reason: elective    Airway not difficult    Staffing  Performed: CRNA   Authorized by: Douglas Cerna MD    Performed by: BRIDGETTE Varma-EVELYN  Patient location during procedure: OR    Patient Condition  Indications for airway management: anesthesia  Patient position: sniffing  MILS maintained throughout  Planned trial extubation  Sedation level: no sedation     Final Airway Details   Preoxygenated: yes  Final airway type: endotracheal airway  Successful airway: ETT  Cuffed: yes   Successful intubation technique: direct laryngoscopy  Adjuncts used in placement: intubating stylet and cricoid pressure  Endotracheal tube insertion site: oral  Blade: Susy  Blade size: #3  ETT size (mm): 7.0  Cormack-Lehane Classification: grade IIa - partial view of glottis  Placement verified by: chest auscultation and capnometry   Measured from: lips  Number of attempts at approach: 1  Number of other approaches attempted: 0    Additional Comments  Small mouth stiff neck anterior Dlx1 cords seen with cricoid

## 2025-08-06 NOTE — PROGRESS NOTES
Physical Therapy                 Therapy Communication Note    Patient Name: Jessy Masterson  MRN: [id number]  Department: [facility] [address]  Room: [address] [place] [address]  Today's Date: 8/6/2025     Discipline: Physical Therapy    Missed Visit: PT Missed Visit: Yes     Missed Visit Reason: Missed Visit Reason: Patient refused, Patient sleeping    Missed Time: Attempt    Comment: Pt was encountered lying on her side, daughter is present. Her daughter spoke with patient about participating with PT and adamantly refuses stating she is fatigued, and tired and would like to rest.

## 2025-08-06 NOTE — PROGRESS NOTES
Jessy Masterson is a 89 y.o. female     ER CP was postponed yesterday and is scheduled to get done today hopefully    Review of Systems     Constitutional: no fever, no chills, not feeling poorly, not feeling tired   Cardiovascular: no chest pain   Respiratory: no cough, wheezing or shortness of breath a  Gastrointestinal: no abdominal pain, no constipation, no melena, no nausea, no diarrhea, no vomiting and no blood in stools.   Neurological: no headache,   All other systems have been reviewed and are negative for complaint.       Vitals:    08/06/25 1102   BP: 155/67   Pulse:    Resp:    Temp:    SpO2:         Scheduled medications  Scheduled Medications[1]  Continuous medications  Continuous Medications[2]  PRN medications  PRN Medications[3]    Lab Review   Results from last 7 days   Lab Units 08/06/25  0710 08/05/25  0537 08/04/25  1300   WBC AUTO x10*3/uL 5.5 6.4 9.1   HEMOGLOBIN g/dL 12.0 11.7* 12.7   HEMATOCRIT % 35.5* 34.2* 39.2   PLATELETS AUTO x10*3/uL 277 257 276     Results from last 7 days   Lab Units 08/06/25  0710 08/05/25  0537 08/04/25  1300   SODIUM mmol/L 138 139 134*   POTASSIUM mmol/L 3.6 3.6 4.3   CHLORIDE mmol/L 106 107 99   CO2 mmol/L 22 24 21   BUN mg/dL 14 20 30*   CREATININE mg/dL 0.74 0.75 1.15*   CALCIUM mg/dL 8.3* 8.6 8.7   PROTEIN TOTAL g/dL 5.7* 5.9* 6.4   BILIRUBIN TOTAL mg/dL 2.6* 4.9* 8.0*   ALK PHOS U/L 356* 399* 518*   ALT U/L 153* 214* 295*   AST U/L 90* 158* 238*   GLUCOSE mg/dL 104* 99 97     Results from last 7 days   Lab Units 08/04/25  1728   TROPHS ng/L 19*        FL GI ERCP    (Results Pending)         Physical Exam    Constitutional   General appearance: Alert and in no acute distress.   Pulmonary   Respiratory assessment: No respiratory distress, normal respiratory rhythm and effort.    Auscultation of Lungs: Clear bilateral breath sounds.   Cardiovascular   Auscultation of heart: Apical pulse normal, heart rate and rhythm normal, normal S1 and S2, no  murmurs and no pericardial rub.    Exam for edema: No peripheral edema.   Abdomen   Abdominal Exam: No bruits, normal bowel sounds, soft, non-tender, no abdominal mass palpated.    Liver and Spleen exam: No hepato-splenomegaly.   Musculoskeletal     Inspection/palpation of joints, bones and muscles: No joint swelling. Normal movement of all extremities.   Neurologic   Cranial nerves: Nerves 2-12 were intact, no focal neuro defects.         Assessment/Plan      #Acute choledocholithiasis  #Transaminitis  #Acute kidney injury  Starting IV pain medications antiemetics  Continue fluids and antibiotics  Kidney functions have improved  Consulting surgery for cholecystectomy  Consulting GI for ERCP     #Hypertension  Holding blood pressure medicines  Will use IV hydralazine as needed         [1] ciprofloxacin, 400 mg, intravenous, q12h  enoxaparin, 40 mg, subcutaneous, q24h  metroNIDAZOLE, 500 mg, intravenous, q8h  pantoprazole, 40 mg, oral, Daily before breakfast   Or  pantoprazole, 40 mg, intravenous, Daily before breakfast     [2]    [3] PRN medications: acetaminophen **OR** acetaminophen **OR** acetaminophen, guaiFENesin, hydrALAZINE, morphine, ondansetron **OR** ondansetron, polyethylene glycol

## 2025-08-07 LAB
ALBUMIN SERPL BCP-MCNC: 3.4 G/DL (ref 3.4–5)
ALP SERPL-CCNC: 392 U/L (ref 33–136)
ALT SERPL W P-5'-P-CCNC: 136 U/L (ref 7–45)
ANION GAP SERPL CALC-SCNC: 18 MMOL/L (ref 10–20)
AST SERPL W P-5'-P-CCNC: 71 U/L (ref 9–39)
BILIRUB SERPL-MCNC: 2.1 MG/DL (ref 0–1.2)
BUN SERPL-MCNC: 16 MG/DL (ref 6–23)
CALCIUM SERPL-MCNC: 8.4 MG/DL (ref 8.6–10.3)
CHLORIDE SERPL-SCNC: 101 MMOL/L (ref 98–107)
CO2 SERPL-SCNC: 23 MMOL/L (ref 21–32)
CREAT SERPL-MCNC: 0.84 MG/DL (ref 0.5–1.05)
EGFRCR SERPLBLD CKD-EPI 2021: 67 ML/MIN/1.73M*2
ERYTHROCYTE [DISTWIDTH] IN BLOOD BY AUTOMATED COUNT: 13.7 % (ref 11.5–14.5)
GLUCOSE SERPL-MCNC: 119 MG/DL (ref 74–99)
HCT VFR BLD AUTO: 38.5 % (ref 36–46)
HGB BLD-MCNC: 13.2 G/DL (ref 12–16)
MCH RBC QN AUTO: 31.9 PG (ref 26–34)
MCHC RBC AUTO-ENTMCNC: 34.3 G/DL (ref 32–36)
MCV RBC AUTO: 93 FL (ref 80–100)
NRBC BLD-RTO: 0 /100 WBCS (ref 0–0)
PLATELET # BLD AUTO: 344 X10*3/UL (ref 150–450)
POTASSIUM SERPL-SCNC: 4.1 MMOL/L (ref 3.5–5.3)
PROT SERPL-MCNC: 5.7 G/DL (ref 6.4–8.2)
RBC # BLD AUTO: 4.14 X10*6/UL (ref 4–5.2)
SODIUM SERPL-SCNC: 138 MMOL/L (ref 136–145)
WBC # BLD AUTO: 6.8 X10*3/UL (ref 4.4–11.3)

## 2025-08-07 PROCEDURE — 99232 SBSQ HOSP IP/OBS MODERATE 35: CPT | Performed by: INTERNAL MEDICINE

## 2025-08-07 PROCEDURE — 36415 COLL VENOUS BLD VENIPUNCTURE: CPT | Performed by: NURSE PRACTITIONER

## 2025-08-07 PROCEDURE — 97161 PT EVAL LOW COMPLEX 20 MIN: CPT | Mod: GP

## 2025-08-07 PROCEDURE — 97165 OT EVAL LOW COMPLEX 30 MIN: CPT | Mod: GO | Performed by: OCCUPATIONAL THERAPIST

## 2025-08-07 PROCEDURE — 85027 COMPLETE CBC AUTOMATED: CPT | Performed by: NURSE PRACTITIONER

## 2025-08-07 PROCEDURE — 2500000004 HC RX 250 GENERAL PHARMACY W/ HCPCS (ALT 636 FOR OP/ED): Performed by: INTERNAL MEDICINE

## 2025-08-07 PROCEDURE — 1200000002 HC GENERAL ROOM WITH TELEMETRY DAILY

## 2025-08-07 PROCEDURE — 84075 ASSAY ALKALINE PHOSPHATASE: CPT | Performed by: NURSE PRACTITIONER

## 2025-08-07 RX ADMIN — METRONIDAZOLE 500 MG: 500 INJECTION, SOLUTION INTRAVENOUS at 13:50

## 2025-08-07 RX ADMIN — ENOXAPARIN SODIUM 40 MG: 100 INJECTION SUBCUTANEOUS at 20:38

## 2025-08-07 RX ADMIN — METRONIDAZOLE 500 MG: 500 INJECTION, SOLUTION INTRAVENOUS at 05:07

## 2025-08-07 RX ADMIN — CIPROFLOXACIN 400 MG: 2 INJECTION, SOLUTION INTRAVENOUS at 20:37

## 2025-08-07 RX ADMIN — CIPROFLOXACIN 400 MG: 2 INJECTION, SOLUTION INTRAVENOUS at 08:46

## 2025-08-07 RX ADMIN — PANTOPRAZOLE SODIUM 40 MG: 40 INJECTION, POWDER, FOR SOLUTION INTRAVENOUS at 08:46

## 2025-08-07 RX ADMIN — HYDRALAZINE HYDROCHLORIDE 10 MG: 20 INJECTION INTRAMUSCULAR; INTRAVENOUS at 05:06

## 2025-08-07 RX ADMIN — METRONIDAZOLE 500 MG: 500 INJECTION, SOLUTION INTRAVENOUS at 21:58

## 2025-08-07 ASSESSMENT — PAIN SCALES - GENERAL
PAINLEVEL_OUTOF10: 0 - NO PAIN

## 2025-08-07 ASSESSMENT — COGNITIVE AND FUNCTIONAL STATUS - GENERAL
DRESSING REGULAR LOWER BODY CLOTHING: A LITTLE
TURNING FROM BACK TO SIDE WHILE IN FLAT BAD: A LITTLE
TURNING FROM BACK TO SIDE WHILE IN FLAT BAD: A LITTLE
MOBILITY SCORE: 17
TOILETING: A LITTLE
MOVING FROM LYING ON BACK TO SITTING ON SIDE OF FLAT BED WITH BEDRAILS: A LITTLE
DRESSING REGULAR UPPER BODY CLOTHING: A LITTLE
TOILETING: A LITTLE
WALKING IN HOSPITAL ROOM: A LITTLE
TOILETING: A LITTLE
WALKING IN HOSPITAL ROOM: A LITTLE
PERSONAL GROOMING: A LITTLE
DRESSING REGULAR LOWER BODY CLOTHING: A LOT
STANDING UP FROM CHAIR USING ARMS: A LITTLE
CLIMB 3 TO 5 STEPS WITH RAILING: A LOT
MOVING TO AND FROM BED TO CHAIR: A LITTLE
DRESSING REGULAR UPPER BODY CLOTHING: A LITTLE
HELP NEEDED FOR BATHING: A LITTLE
CLIMB 3 TO 5 STEPS WITH RAILING: A LOT
WALKING IN HOSPITAL ROOM: A LITTLE
MOVING FROM LYING ON BACK TO SITTING ON SIDE OF FLAT BED WITH BEDRAILS: A LITTLE
MOBILITY SCORE: 17
MOBILITY SCORE: 18
MOVING TO AND FROM BED TO CHAIR: A LITTLE
STANDING UP FROM CHAIR USING ARMS: A LITTLE
MOVING FROM LYING ON BACK TO SITTING ON SIDE OF FLAT BED WITH BEDRAILS: A LITTLE
PERSONAL GROOMING: A LITTLE
HELP NEEDED FOR BATHING: A LOT
HELP NEEDED FOR BATHING: A LITTLE
MOVING TO AND FROM BED TO CHAIR: A LITTLE
DRESSING REGULAR LOWER BODY CLOTHING: A LITTLE
DRESSING REGULAR UPPER BODY CLOTHING: A LITTLE
CLIMB 3 TO 5 STEPS WITH RAILING: A LITTLE
DAILY ACTIVITIY SCORE: 17
STANDING UP FROM CHAIR USING ARMS: A LITTLE
DAILY ACTIVITIY SCORE: 19
PERSONAL GROOMING: A LITTLE
DAILY ACTIVITIY SCORE: 19
TURNING FROM BACK TO SIDE WHILE IN FLAT BAD: A LITTLE

## 2025-08-07 ASSESSMENT — PAIN - FUNCTIONAL ASSESSMENT
PAIN_FUNCTIONAL_ASSESSMENT: 0-10

## 2025-08-07 ASSESSMENT — ACTIVITIES OF DAILY LIVING (ADL)
ADL_ASSISTANCE: NEEDS ASSISTANCE
ADL_ASSISTANCE: INDEPENDENT

## 2025-08-07 NOTE — PROGRESS NOTES
08/07/25 1208   Discharge Planning   Home or Post Acute Services None   Expected Discharge Disposition Home   Stroke Family Assessment   Stroke Family Assessment Needed No   Intensity of Service   Intensity of Service 0-30 min     PLAN/BARRIER: Antonio alfaro tomorrow, ID following  DISP: home  DME: none  O2: room air  WOUNDS: none  ADOD: 1-2 days  Kira Palacios RN

## 2025-08-07 NOTE — CARE PLAN
The patient's goals for the shift include      The clinical goals for the shift include maintain safety    Over the shift, the patient is making progress toward the following goals. Barriers to progression include     Problem: Pain - Adult  Goal: Verbalizes/displays adequate comfort level or baseline comfort level  Outcome: Progressing     Problem: Safety - Adult  Goal: Free from fall injury  Outcome: Progressing     Problem: Discharge Planning  Goal: Discharge to home or other facility with appropriate resources  Outcome: Progressing     Problem: Chronic Conditions and Co-morbidities  Goal: Patient's chronic conditions and co-morbidity symptoms are monitored and maintained or improved  Outcome: Progressing     Problem: Nutrition  Goal: Nutrient intake appropriate for maintaining nutritional needs  Outcome: Progressing     Problem: Pain  Goal: Takes deep breaths with improved pain control throughout the shift  Outcome: Progressing  Goal: Turns in bed with improved pain control throughout the shift  Outcome: Progressing  Goal: Walks with improved pain control throughout the shift  Outcome: Progressing  Goal: Performs ADL's with improved pain control throughout shift  Outcome: Progressing  Goal: Participates in PT with improved pain control throughout the shift  Outcome: Progressing  Goal: Free from opioid side effects throughout the shift  Outcome: Progressing  Goal: Free from acute confusion related to pain meds throughout the shift  Outcome: Progressing     Problem: Fall/Injury  Goal: Not fall by end of shift  Outcome: Progressing  Goal: Be free from injury by end of the shift  Outcome: Progressing  Goal: Verbalize understanding of personal risk factors for fall in the hospital  Outcome: Progressing  Goal: Verbalize understanding of risk factor reduction measures to prevent injury from fall in the home  Outcome: Progressing  Goal: Use assistive devices by end of the shift  Outcome: Progressing  Goal: Pace  activities to prevent fatigue by end of the shift  Outcome: Progressing

## 2025-08-07 NOTE — PROGRESS NOTES
Physical Therapy    Physical Therapy Evaluation    Patient Name: Jessy Masterson  MRN: [id number]  Department: [place] [id number]  Room: [address] [place] [address]  Today's Date: 8/7/2025   Time Calculation  Start Time: 1216  Stop Time: 1233  Time Calculation (min): 17 min    Assessment/Plan   PT Assessment  PT Assessment Results: Decreased strength, Decreased range of motion, Decreased endurance, Impaired balance  Rehab Prognosis: Excellent  Barriers to Discharge Home: Caregiver assistance  End of Session Communication: Bedside nurse  Assessment Comment: Pt completed evaluation. She was assisted to her chair with the assistance of PT and her daughter who translated into Polish. She is mobilizing with assistance and will benefit from continued PT in acute setting as she is at high risk for loss of strength and further decline.  End of Session Patient Position: Alarm on, Up in chair  IP OR SWING BED PT PLAN  Inpatient or Swing Bed: Inpatient  PT Plan  Treatment/Interventions: Bed mobility, Transfer training, Gait training, Stair training  PT Plan: Ongoing PT  PT Frequency: 3 times per week  PT Discharge Recommendations: Low intensity level of continued care  Equipment Recommended upon Discharge: Wheeled walker  PT Recommended Transfer Status: Assist x1  PT - OK to Discharge: Yes    Subjective     PT Visit Info:  PT Received On: 08/07/25  General Visit Information:  General  Reason for Referral: Pt admitted to [facility] for treatment of , Acute choledocholithiasis , Transaminitis, Acute kidney injury with anticipated lap dominic tomorrow  (8/7)  Referred By: Kendra Jones, BRIDGETTE-CNP  Past Medical History Relevant to Rehab: Medical History[1]    Family/Caregiver Present: Yes  Caregiver Feedback: Daughter (primary caregiver)  Prior to Session Communication: Bedside nurse  Patient Position Received: Bed, 3 rail up, Alarm on  Preferred Learning Style: auditory, verbal, visual  General Comment: agreeable to session, daughter primary   for Polish language  Home Living:  Home Living  Type of Home: House  Lives With: Adult children, Other (Comment) (family)  Home Adaptive Equipment: Walker rolling or standard, Cane  Home Layout: One level  Home Access: Stairs to enter with rails  Entrance Stairs-Rails: Both  Entrance Stairs-Number of Steps: 2  Bathroom Shower/Tub: Walk-in shower  Bathroom Toilet: Standard  Bathroom Equipment: Grab bars in shower, Grab bars around toilet  Home Living Comments: Pt lives in home with numerous family members to assist her. She is an indpendent ambulator at baseline with a walker at home. She has near 24 hr support at home.  Prior Level of Function:  Prior Function Per Pt/Caregiver Report  Level of [place]: Needs assistance with ADLs, Needs assistance with homemaking, Needs assistance with functional transfers  Receives Help From: Family  ADL Assistance: Needs assistance  Homemaking Assistance: Needs assistance  Ambulatory Assistance: Independent  Prior Function Comments: 4ww at baseline, no falls in last 6 months.  Precautions:  Precautions  Medical Precautions: Fall precautions             Objective   Pain:  Pain Assessment  Pain Assessment: 0-10  0-10 (Numeric) Pain Score: 0 - No pain  Cognition:  Cognition  Overall Cognitive Status: Impaired at baseline    General Assessments:                  Activity Tolerance  Endurance: Tolerates 10 - 20 min exercise with multiple rests    Sensation  Light Touch: No apparent deficits    Strength  Strength Comments: grossly 4/5 LE's  Strength  Strength Comments: grossly 4/5 LE's                Postural Control  Postural Control: Within Functional Limits    Static Sitting Balance  Static Sitting-Balance Support: Bilateral upper extremity supported  Static Sitting-Level of Assistance: Close supervision  Static Sitting-Comment/Number of Minutes: sat EOB SBA with good seated balance and no LOB    Static Standing Balance  Static Standing-Balance Support: Bilateral upper extremity  supported  Static Standing-Level of Assistance: Close supervision  Static Standing-Comment/Number of Minutes: FWW used and no LOB noted.  Dynamic Standing Balance  Dynamic Standing-Balance Support: Bilateral upper extremity supported  Dynamic Standing-Comments: Stood and able to turn head without LOB and look for stable surface to sit upon with cues for finding.  Functional Assessments:  Bed Mobility  Bed Mobility: Yes  Bed Mobility 1  Bed Mobility 1: Supine to sitting  Level of Assistance 1: Minimum assistance  Bed Mobility Comments 1: Cues for safe log roll technique. Min A HHA from her daughter.    Transfers  Transfer: Yes  Transfer 1  Technique 1: Sit to stand, Stand to sit  Transfer Device 1: Walker  Transfer Level of Assistance 1: Close supervision  Trials/Comments 1: Pt performed STS with SBA, just req'd cues for some obstacle avoidance and finding stable surface to sit upon behind her, visual cues for finding arm rests.    Ambulation/Gait Training  Ambulation/Gait Training Performed: Yes  Ambulation/Gait Training 1  Surface 1: Level tile  Device 1: Rolling walker  Assistance 1: Contact guard  Quality of Gait 1: Decreased step length  Comments/Distance (ft) 1: Ambulates total of 32' with CGA and FWW used. Cues for obstacle avoidance and safety with lines. 1 standing rest break to look outside the window.  Extremity/Trunk Assessments:              Outcome Measures:  [facility] Basic Mobility  Turning from your back to your side while in a flat bed without using bedrails: A little  Moving from lying on your back to sitting on the side of a flat bed without using bedrails: A little  Moving to and from bed to chair (including a wheelchair): A little  Standing up from a chair using your arms (e.g. wheelchair or bedside chair): A little  To walk in hospital room: A little  Climbing 3-5 steps with railing: A little  Basic Mobility - Total Score: 18    Encounter Problems       Encounter Problems (Active)       Balance        LTG - Patient will demonstrate Intervention to enhance balance for safe completion of daily activities       Start:  08/07/25    Expected End:  08/21/25               Mobility       LTG - Patient will navigate 2 steps with HR used and min A.       Start:  08/07/25    Expected End:  08/21/25            STG - Patient will ambulate mod I with LRAD and >75'       Start:  08/07/25    Expected End:  08/21/25               PT Transfers       STG - Transfer from bed to chair with mod I and LRAD       Start:  08/07/25    Expected End:  08/21/25            STG - Patient will perform bed mobility independently.       Start:  08/07/25    Expected End:  08/21/25                   Education Documentation  No documentation found.  Education Comments  No comments found.                 [1]   Past Medical History:  Diagnosis Date    Chronic orthostatic hypotension 05/19/2023    Dementia     GERD (gastroesophageal reflux disease)     Glaucoma     HTN (hypertension)     Ischemic optic neuropathy, 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    Rectal bleeding 05/19/2023

## 2025-08-07 NOTE — PROGRESS NOTES
Occupational Therapy    Evaluation    Patient Name: Jessy Masterson  MRN: [id number]  Department: [place] [id number]  Room: [address] [place] [address]  Today's Date: 8/7/2025  Time Calculation  Start Time: 1418  Stop Time: 1431  Time Calculation (min): 13 min        Assessment:  OT Assessment: Pt presents to OT this date and demos decreased balance, strength, endurance, LE ROM and cognition/safety awareness resulting in decreased safety and independence with ADLs and functional transfers/mobility. Pt requires skilled OT services to address above deficits to safely return to OF.  Prognosis: Good  Barriers to Discharge Home: Cognition needs  Cognition Needs: 24hr supervision for safety awareness needed, Cognition-related high falls risk, Insight of patient limited regarding functional ability/needs, Recollection or understanding of precautions/restrictions limited  Evaluation/Treatment Tolerance: Patient limited by fatigue  Medical Staff Made Aware: Yes  End of Session Communication: Bedside nurse  End of Session Patient Position: Bed, 3 rail up, Alarm on  OT Assessment Results: Decreased ADL status, Decreased upper extremity strength, Decreased safe judgment during ADL, Decreased cognition, Decreased endurance, Decreased functional mobility, Decreased trunk control for functional activities, Decreased IADLs  Prognosis: Good  Evaluation/Treatment Tolerance: Patient limited by fatigue  Medical Staff Made Aware: Yes  Strengths: Premorbid level of function, Support of Caregivers, Support and attitude of living partners  Barriers to Participation: Comorbidities, Insight into problems  Plan:  Treatment Interventions: ADL retraining, Functional transfer training, UE strengthening/ROM, Endurance training, Cognitive reorientation, Patient/family training, Equipment evaluation/education, Compensatory technique education  OT Frequency: 3 times per week  OT Discharge Recommendations: Low intensity level of continued care, 24 hr supervision due  to cognition  Equipment Recommended upon Discharge:  (TBD)  OT Recommended Transfer Status: Assist of 1  OT - OK to Discharge: Yes (OT POC established this date)  Treatment Interventions: ADL retraining, Functional transfer training, UE strengthening/ROM, Endurance training, Cognitive reorientation, Patient/family training, Equipment evaluation/education, Compensatory technique education    Subjective     OT Visit Info:  OT Received On: 08/07/25  General:  General  Reason for Referral: Pt is an 88 y/o female who presented with c/o epigastric pain and syncope. [place] [facility] choledocholithiasis, Transaminitis, Acute kidney injury with anticipated lap dominic tomorrow (8/7).  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 Pt's sister translating d/t pt is primarily Polish speaking.  Precautions:  Medical Precautions: Fall precautions            Pain:  Pain Assessment  Pain Assessment: 0-10  0-10 (Numeric) Pain Score: 0 - No pain    Objective   Cognition:  Overall Cognitive Status: Impaired at baseline  Orientation Level: Disoriented to time, Disoriented to situation  Memory: Exceptions to WFL  Long-Term Memory: Impaired  Short-Term Memory: Impaired  Working Memory: Impaired  Insight: Moderate  Impulsive: Mildly  Processing Speed: Delayed           Home Living:  Type of  Home: House  Lives With: Adult children, Other (Comment) (dtr)  Home Adaptive Equipment: Walker rolling or standard, Cane  Home Layout: One level  Home Access: Stairs to enter with rails  Entrance Stairs-Rails: Both  Entrance Stairs-Number of Steps: 2  Bathroom Shower/Tub: Walk-in shower  Bathroom Toilet: Standard  Bathroom Equipment: Grab bars in shower, Grab bars around toilet  Prior Function:  Level of [place]: Independent with ADLs and functional transfers, Needs assistance with homemaking  Receives Help From: Family  ADL Assistance: Independent  Homemaking Assistance: Needs assistance (family assists)  Ambulatory Assistance: Independent (using walker)  Prior Function Comments: pt uses walker at baseline. Per sister pt is Mod I with ADLs at baseline and denies recent falls       ADL:  Grooming Deficit: Setup (anticipated)  LE Dressing Assistance: Maximal  LE Dressing Deficit: Don/doff R sock, Don/doff L sock  Activity Tolerance:  Endurance: Tolerates 10 - 20 min exercise with multiple rests  Bed Mobility/Transfers: Bed Mobility  Bed Mobility: Yes  Bed Mobility 1  Bed Mobility 1: Sitting to supine  Level of Assistance 1: Minimum assistance, Minimal verbal cues  Bed Mobility Comments 1: assist at LEs, cues for sequencing, modified log roll    Transfers  Transfer: Yes  Transfer 1  Technique 1: Sit to stand, Stand to sit  Transfer Device 1: Gait belt, Walker  Transfer Level of Assistance 1:  (Min A from chair, SBA from EOB)  Trials/Comments 1: cues for safe hand placement and walker safety      Functional Mobility:  Functional Mobility  Functional Mobility Performed: Yes  Functional Mobility 1  Comments 1: Pt functionally navigated x min household distance using FWW with Min A x1. Cues for walker safety, assist for balance and safe walker management.  Sitting Balance:  Static Sitting Balance  Static Sitting-Balance Support: Bilateral upper extremity supported, Feet supported  Static Sitting-Level of Assistance:  Close supervision  Static Sitting-Comment/Number of Minutes: at EOB  Dynamic Sitting Balance  Dynamic Sitting-Comments: SBA at EOB  Standing Balance:  Static Standing Balance  Static Standing-Balance Support: Bilateral upper extremity supported  Static Standing-Level of Assistance: Contact guard  Static Standing-Comment/Number of Minutes: FWW  Dynamic Standing Balance  Dynamic Standing-Comments: Min A using FWW        Vision:Vision - Basic Assessment  Current Vision: Wears glasses all the time  Sensation:  Light Touch: No apparent deficits  Strength:  Strength Comments: B UEs grossly 3/5 based on function       Coordination:  Movements are Fluid and Coordinated: Yes        Extremities: RUE   RUE : Within Functional Limits and LUE   LUE: Within Functional Limits      Outcome Measures:[facility] Daily Activity  Putting on and taking off regular lower body clothing: A lot  Bathing (including washing, rinsing, drying): A lot  Putting on and taking off regular upper body clothing: A little  Toileting, which includes using toilet, bedpan or urinal: A little  Taking care of personal grooming such as brushing teeth: A little  Eating Meals: None  Daily Activity - Total Score: 17        Education Documentation  Body Mechanics, taught by Jinny Francisco OT at 8/7/2025  3:06 PM.  Learner: Patient  Readiness: Acceptance  Method: Explanation  Response: Needs Reinforcement    Precautions, taught by Jinny Francisco OT at 8/7/2025  3:06 PM.  Learner: Patient  Readiness: Acceptance  Method: Explanation  Response: Needs Reinforcement    ADL Training, taught by Jinny Francisco OT at 8/7/2025  3:06 PM.  Learner: Patient  Readiness: Acceptance  Method: Explanation  Response: Needs Reinforcement         Goals:  Encounter Problems       Encounter Problems (Active)       ADLs       Patient will perform UB and LB bathing with stand by assist level of assistance and grab bars, shower chair, and long-handled sponge.       Start:  08/07/25     Expected End:  08/21/25            Patient with complete upper body dressing with set-up level of assistance donning and doffing all UE clothes with PRN adaptive equipment.       Start:  08/07/25    Expected End:  08/21/25            Patient with complete lower body dressing with stand by assist level of assistance donning and doffing all LE clothes  with PRN adaptive equipment.       Start:  08/07/25    Expected End:  08/21/25            Patient will complete daily grooming tasks with modified independent level of assistance and PRN adaptive equipment while standing.       Start:  08/07/25    Expected End:  08/21/25            Patient will complete toileting including hygiene clothing management/hygiene with modified independent level of assistance and raised toilet seat and grab bars.       Start:  08/07/25    Expected End:  08/21/25               MOBILITY       Patient will perform Functional mobility x Household distances/Community Distances with supervision level of assistance and least restrictive device in order to improve safety and functional mobility.       Start:  08/07/25    Expected End:  08/21/25               TRANSFERS       Patient will perform bed mobility modified independent level of assistance via log roll in order to improve safety and independence with mobility       Start:  08/07/25    Expected End:  08/21/25            Patient will complete functional transfers with least restrictive device with supervision level of assistance.       Start:  08/07/25    Expected End:  08/21/25

## 2025-08-07 NOTE — PROGRESS NOTES
Jessy Masterson is a 89 y.o. female     ERCP confirmed ascending ascending cholangitis    Review of Systems     Constitutional: no fever, no chills, not feeling poorly, not feeling tired   Cardiovascular: no chest pain   Respiratory: no cough, wheezing or shortness of breath a  Gastrointestinal: no abdominal pain, no constipation, no melena, no nausea, no diarrhea, no vomiting and no blood in stools.   Neurological: no headache,   All other systems have been reviewed and are negative for complaint.       Vitals:    08/07/25 1546   BP: 151/72   Pulse: 85   Resp: 19   Temp: 36.7 °C (98.1 °F)   SpO2: 98%        Scheduled medications  Scheduled Medications[1]  Continuous medications  Continuous Medications[2]  PRN medications  PRN Medications[3]    Lab Review   Results from last 7 days   Lab Units 08/07/25  0741 08/06/25  0710 08/05/25  0537   WBC AUTO x10*3/uL 6.8 5.5 6.4   HEMOGLOBIN g/dL 13.2 12.0 11.7*   HEMATOCRIT % 38.5 35.5* 34.2*   PLATELETS AUTO x10*3/uL 344 277 257     Results from last 7 days   Lab Units 08/07/25  0741 08/06/25  0710 08/05/25  0537   SODIUM mmol/L 138 138 139   POTASSIUM mmol/L 4.1 3.6 3.6   CHLORIDE mmol/L 101 106 107   CO2 mmol/L 23 22 24   BUN mg/dL 16 14 20   CREATININE mg/dL 0.84 0.74 0.75   CALCIUM mg/dL 8.4* 8.3* 8.6   PROTEIN TOTAL g/dL 5.7* 5.7* 5.9*   BILIRUBIN TOTAL mg/dL 2.1* 2.6* 4.9*   ALK PHOS U/L 392* 356* 399*   ALT U/L 136* 153* 214*   AST U/L 71* 90* 158*   GLUCOSE mg/dL 119* 104* 99     Results from last 7 days   Lab Units 08/04/25  1728   TROPHS ng/L 19*        FL GI ERCP   Final Result            Physical Exam    Constitutional   General appearance: Alert and in no acute distress.   Pulmonary   Respiratory assessment: No respiratory distress, normal respiratory rhythm and effort.    Auscultation of Lungs: Clear bilateral breath sounds.   Cardiovascular   Auscultation of heart: Apical pulse normal, heart rate and rhythm normal, normal S1 and S2, no murmurs and  no pericardial rub.    Exam for edema: No peripheral edema.   Abdomen   Abdominal Exam: No bruits, normal bowel sounds, soft, non-tender, no abdominal mass palpated.    Liver and Spleen exam: No hepato-splenomegaly.   Musculoskeletal     Inspection/palpation of joints, bones and muscles: No joint swelling. Normal movement of all extremities.   Neurologic   Cranial nerves: Nerves 2-12 were intact, no focal neuro defects.         Assessment/Plan      #Acute choledocholithiasis  #Transaminitis  #Acute kidney injury  It is post ERCP which confirms ascending cholangitis  Will get ID on board  Cholecystectomy planned for tomorrow     #Hypertension  Holding blood pressure medicines  Will use IV hydralazine as needed           [1] ciprofloxacin, 400 mg, intravenous, q12h  enoxaparin, 40 mg, subcutaneous, q24h  lidocaine, 0.1 mL, subcutaneous, Once  metroNIDAZOLE, 500 mg, intravenous, q8h  pantoprazole, 40 mg, oral, Daily before breakfast   Or  pantoprazole, 40 mg, intravenous, Daily before breakfast     [2]    [3] PRN medications: acetaminophen **OR** acetaminophen **OR** acetaminophen, guaiFENesin, hydrALAZINE, HYDROmorphone, HYDROmorphone, meperidine, metoclopramide, morphine, ondansetron **OR** ondansetron, ondansetron, oxyCODONE, oxygen, polyethylene glycol

## 2025-08-07 NOTE — CONSULTS
INFECTIOUS DISEASE INPATIENT INITIAL CONSULTATION    Referred By: Chana Whelan    Reason For Consult:  Ascending cholangitis    HPI:  This is a 89 y.o. female with PMH of dementia, HTN who presented with epigastric pain, n/v.    Outpatient workup eventually revealed choledocholithiasis and so admitted for this. Went for ERCP on 8/6 and noted to have purulence from the major papilla. Multiple stones noted which were swept out and clearance of bile duct achieved.      Afebrile here. WBC normal. No culture data. Tbili down from 8 to 2.6 now and AST/ALT improving. Is on IV Cipro/Flagyl.    ASSESSMENT/PLAN:    Choledocholithiasis and Ascending Cholangitis - s/p ERCP 8/6 with stones cleared  Penicillin Allergy - limits abx options    Continue on IV Cipro/Flagyl. Plans for lap dominic tomorrow. Anticipate home with PO Cipro/Flagyl when ready after surgery.    Monitoring for adverse effects of abx such as rash/itching/diarrhea.    Will follow. Thanks!    Dionicio Gonzales MD  [facility]  Office #[phone]    PHYSICAL EXAM:  Gen - NAD  Heart - RRR  Lungs - clear bilaterally, no wh  Abd - soft, no ttp, BS present  Skin - no rash    ABX: IV Cipro/Flagyl    IMAGING:   CT A/P 8/4  Impression:     Extensive choledocholithiasis with mild cholelithiasis. There is  diffuse intrahepatic and extrahepatic biliary ductal dilatation  ending at the stone in the region of the ampulla of Vater. Please see  above for details. Recommend ERCP in follow-up.    Cholelithiasis with mild gallbladder dilatation but no adjacent fluid.    Nonspecific hyperdense material in the stomach. This could be  ingested medication and/or blood (such as from gastritis). Clinical  correlation is needed.    Several parapelvic left renal cysts.    Mild-to-moderate stool throughout the imaged colon.       LABS:  Lab Results   Component Value Date    WBC 5.5 08/06/2025    HGB 12.0 08/06/2025    HCT 35.5 (L) 08/06/2025    MCV 95 08/06/2025    PLT  "277 08/06/2025      Results from last 72 hours   Lab Units 08/06/25  0710   SODIUM mmol/L 138   POTASSIUM mmol/L 3.6   CHLORIDE mmol/L 106   CO2 mmol/L 22   BUN mg/dL 14   CREATININE mg/dL 0.74   GLUCOSE mg/dL 104*   CALCIUM mg/dL 8.3*   ANION GAP mmol/L 14   EGFR mL/min/1.73m*2 78     Results from last 72 hours   Lab Units 08/06/25  0710 08/05/25  0537   ALK PHOS U/L 356* 399*   BILIRUBIN TOTAL mg/dL 2.6* 4.9*   BILIRUBIN DIRECT mg/dL  --  3.2*   PROTEIN TOTAL g/dL 5.7* 5.9*   ALT U/L 153* 214*   AST U/L 90* 158*   ALBUMIN g/dL 3.2* 3.4     Estimated Creatinine Clearance: 47.5 mL/min (by C-G formula based on SCr of 0.74 mg/dL).  No results found for: \"CRP\", \"SEDRATE\"    Allergies:  Aspirin, Ibuprofen, and Penicillins     Vitals (Last 24 Hours):  Heart Rate:  []   Temp:  [36.3 °C (97.3 °F)-37.2 °C (99 °F)]   Resp:  [17-20]   BP: (124-189)/()   Height:  [152.4 cm (5')]   Weight:  [74.8 kg (164 lb 14.5 oz)]   SpO2:  [94 %-98 %]     "

## 2025-08-08 ENCOUNTER — ANESTHESIA (OUTPATIENT)
Dept: OPERATING ROOM | Facility: HOSPITAL | Age: 89
End: 2025-08-08
Payer: MEDICARE

## 2025-08-08 ENCOUNTER — ANESTHESIA EVENT (OUTPATIENT)
Dept: OPERATING ROOM | Facility: HOSPITAL | Age: 89
End: 2025-08-08
Payer: MEDICARE

## 2025-08-08 LAB
ALBUMIN SERPL BCP-MCNC: 3 G/DL (ref 3.4–5)
ALP SERPL-CCNC: 286 U/L (ref 33–136)
ALT SERPL W P-5'-P-CCNC: 101 U/L (ref 7–45)
ANION GAP SERPL CALC-SCNC: 10 MMOL/L (ref 10–20)
AST SERPL W P-5'-P-CCNC: 65 U/L (ref 9–39)
ATRIAL RATE: 82 BPM
BILIRUB SERPL-MCNC: 1.6 MG/DL (ref 0–1.2)
BUN SERPL-MCNC: 23 MG/DL (ref 6–23)
CALCIUM SERPL-MCNC: 8.2 MG/DL (ref 8.6–10.3)
CHLORIDE SERPL-SCNC: 105 MMOL/L (ref 98–107)
CO2 SERPL-SCNC: 27 MMOL/L (ref 21–32)
CREAT SERPL-MCNC: 1.03 MG/DL (ref 0.5–1.05)
EGFRCR SERPLBLD CKD-EPI 2021: 52 ML/MIN/1.73M*2
ERYTHROCYTE [DISTWIDTH] IN BLOOD BY AUTOMATED COUNT: 14.3 % (ref 11.5–14.5)
GLUCOSE BLD MANUAL STRIP-MCNC: 163 MG/DL (ref 74–99)
GLUCOSE SERPL-MCNC: 100 MG/DL (ref 74–99)
HCT VFR BLD AUTO: 35.6 % (ref 36–46)
HGB BLD-MCNC: 11.7 G/DL (ref 12–16)
MCH RBC QN AUTO: 31.8 PG (ref 26–34)
MCHC RBC AUTO-ENTMCNC: 32.9 G/DL (ref 32–36)
MCV RBC AUTO: 97 FL (ref 80–100)
NRBC BLD-RTO: 0 /100 WBCS (ref 0–0)
P AXIS: 72 DEGREES
P OFFSET: 188 MS
P ONSET: 139 MS
PLATELET # BLD AUTO: 311 X10*3/UL (ref 150–450)
POTASSIUM SERPL-SCNC: 3.4 MMOL/L (ref 3.5–5.3)
PR INTERVAL: 158 MS
PROT SERPL-MCNC: 5.6 G/DL (ref 6.4–8.2)
Q ONSET: 218 MS
QRS COUNT: 13 BEATS
QRS DURATION: 82 MS
QT INTERVAL: 402 MS
QTC CALCULATION(BAZETT): 469 MS
QTC FREDERICIA: 446 MS
R AXIS: -2 DEGREES
RBC # BLD AUTO: 3.68 X10*6/UL (ref 4–5.2)
SODIUM SERPL-SCNC: 139 MMOL/L (ref 136–145)
T AXIS: 34 DEGREES
T OFFSET: 419 MS
VENTRICULAR RATE: 82 BPM
WBC # BLD AUTO: 7.4 X10*3/UL (ref 4.4–11.3)

## 2025-08-08 PROCEDURE — 2500000005 HC RX 250 GENERAL PHARMACY W/O HCPCS: Performed by: ANESTHESIOLOGY

## 2025-08-08 PROCEDURE — 2500000004 HC RX 250 GENERAL PHARMACY W/ HCPCS (ALT 636 FOR OP/ED): Performed by: NURSE PRACTITIONER

## 2025-08-08 PROCEDURE — 2500000004 HC RX 250 GENERAL PHARMACY W/ HCPCS (ALT 636 FOR OP/ED): Performed by: INTERNAL MEDICINE

## 2025-08-08 PROCEDURE — BF131ZZ FLUOROSCOPY OF GALLBLADDER AND BILE DUCTS USING LOW OSMOLAR CONTRAST: ICD-10-PCS | Performed by: SURGERY

## 2025-08-08 PROCEDURE — 3700000001 HC GENERAL ANESTHESIA TIME - INITIAL BASE CHARGE: Performed by: SURGERY

## 2025-08-08 PROCEDURE — 7100000002 HC RECOVERY ROOM TIME - EACH INCREMENTAL 1 MINUTE: Performed by: SURGERY

## 2025-08-08 PROCEDURE — 2780000003 HC OR 278 NO HCPCS: Performed by: SURGERY

## 2025-08-08 PROCEDURE — 2500000004 HC RX 250 GENERAL PHARMACY W/ HCPCS (ALT 636 FOR OP/ED): Performed by: NURSE ANESTHETIST, CERTIFIED REGISTERED

## 2025-08-08 PROCEDURE — 76998 US GUIDE INTRAOP: CPT | Performed by: SURGERY

## 2025-08-08 PROCEDURE — 3600000009 HC OR TIME - EACH INCREMENTAL 1 MINUTE - PROCEDURE LEVEL FOUR: Performed by: SURGERY

## 2025-08-08 PROCEDURE — 1100000001 HC PRIVATE ROOM DAILY

## 2025-08-08 PROCEDURE — 2500000001 HC RX 250 WO HCPCS SELF ADMINISTERED DRUGS (ALT 637 FOR MEDICARE OP): Performed by: NURSE PRACTITIONER

## 2025-08-08 PROCEDURE — 0FT44ZZ RESECTION OF GALLBLADDER, PERCUTANEOUS ENDOSCOPIC APPROACH: ICD-10-PCS | Performed by: SURGERY

## 2025-08-08 PROCEDURE — 80053 COMPREHEN METABOLIC PANEL: CPT | Performed by: NURSE PRACTITIONER

## 2025-08-08 PROCEDURE — 7100000001 HC RECOVERY ROOM TIME - INITIAL BASE CHARGE: Performed by: SURGERY

## 2025-08-08 PROCEDURE — 36415 COLL VENOUS BLD VENIPUNCTURE: CPT | Performed by: NURSE PRACTITIONER

## 2025-08-08 PROCEDURE — 2500000005 HC RX 250 GENERAL PHARMACY W/O HCPCS: Performed by: SURGERY

## 2025-08-08 PROCEDURE — 3600000004 HC OR TIME - INITIAL BASE CHARGE - PROCEDURE LEVEL FOUR: Performed by: SURGERY

## 2025-08-08 PROCEDURE — 85027 COMPLETE CBC AUTOMATED: CPT | Performed by: NURSE PRACTITIONER

## 2025-08-08 PROCEDURE — 2500000001 HC RX 250 WO HCPCS SELF ADMINISTERED DRUGS (ALT 637 FOR MEDICARE OP): Performed by: INTERNAL MEDICINE

## 2025-08-08 PROCEDURE — 47562 LAPAROSCOPIC CHOLECYSTECTOMY: CPT | Performed by: SURGERY

## 2025-08-08 PROCEDURE — 99232 SBSQ HOSP IP/OBS MODERATE 35: CPT | Performed by: INTERNAL MEDICINE

## 2025-08-08 PROCEDURE — 2720000007 HC OR 272 NO HCPCS: Performed by: SURGERY

## 2025-08-08 PROCEDURE — 82947 ASSAY GLUCOSE BLOOD QUANT: CPT

## 2025-08-08 PROCEDURE — 3700000002 HC GENERAL ANESTHESIA TIME - EACH INCREMENTAL 1 MINUTE: Performed by: SURGERY

## 2025-08-08 RX ORDER — ROCURONIUM BROMIDE 10 MG/ML
INJECTION, SOLUTION INTRAVENOUS AS NEEDED
Status: DISCONTINUED | OUTPATIENT
Start: 2025-08-08 | End: 2025-08-08

## 2025-08-08 RX ORDER — TRAMADOL HYDROCHLORIDE 50 MG/1
50 TABLET, FILM COATED ORAL EVERY 8 HOURS PRN
Status: DISPENSED | OUTPATIENT
Start: 2025-08-08

## 2025-08-08 RX ORDER — POLYETHYLENE GLYCOL 3350 17 G/17G
17 POWDER, FOR SOLUTION ORAL DAILY
Status: DISPENSED | OUTPATIENT
Start: 2025-08-09

## 2025-08-08 RX ORDER — ONDANSETRON HYDROCHLORIDE 2 MG/ML
4 INJECTION, SOLUTION INTRAVENOUS ONCE AS NEEDED
Status: DISCONTINUED | OUTPATIENT
Start: 2025-08-08 | End: 2025-08-08 | Stop reason: HOSPADM

## 2025-08-08 RX ORDER — DROPERIDOL 2.5 MG/ML
0.62 INJECTION, SOLUTION INTRAMUSCULAR; INTRAVENOUS ONCE AS NEEDED
Status: DISCONTINUED | OUTPATIENT
Start: 2025-08-08 | End: 2025-08-08 | Stop reason: HOSPADM

## 2025-08-08 RX ORDER — LIDOCAINE HYDROCHLORIDE 10 MG/ML
0.1 INJECTION, SOLUTION EPIDURAL; INFILTRATION; INTRACAUDAL; PERINEURAL ONCE
Status: DISCONTINUED | OUTPATIENT
Start: 2025-08-08 | End: 2025-08-08 | Stop reason: HOSPADM

## 2025-08-08 RX ORDER — OXYCODONE HYDROCHLORIDE 5 MG/1
5 TABLET ORAL EVERY 4 HOURS PRN
Status: DISCONTINUED | OUTPATIENT
Start: 2025-08-08 | End: 2025-08-08 | Stop reason: HOSPADM

## 2025-08-08 RX ORDER — LIDOCAINE 560 MG/1
1 PATCH PERCUTANEOUS; TOPICAL; TRANSDERMAL DAILY
Status: DISPENSED | OUTPATIENT
Start: 2025-08-09

## 2025-08-08 RX ORDER — PHENYLEPHRINE HCL IN 0.9% NACL 1 MG/10 ML
SYRINGE (ML) INTRAVENOUS AS NEEDED
Status: DISCONTINUED | OUTPATIENT
Start: 2025-08-08 | End: 2025-08-08

## 2025-08-08 RX ORDER — LIDOCAINE HYDROCHLORIDE 20 MG/ML
INJECTION, SOLUTION INFILTRATION; PERINEURAL AS NEEDED
Status: DISCONTINUED | OUTPATIENT
Start: 2025-08-08 | End: 2025-08-08

## 2025-08-08 RX ORDER — PROPOFOL 10 MG/ML
INJECTION, EMULSION INTRAVENOUS AS NEEDED
Status: DISCONTINUED | OUTPATIENT
Start: 2025-08-08 | End: 2025-08-08

## 2025-08-08 RX ORDER — ONDANSETRON HYDROCHLORIDE 2 MG/ML
INJECTION, SOLUTION INTRAVENOUS AS NEEDED
Status: DISCONTINUED | OUTPATIENT
Start: 2025-08-08 | End: 2025-08-08

## 2025-08-08 RX ORDER — METOCLOPRAMIDE HYDROCHLORIDE 5 MG/ML
10 INJECTION INTRAMUSCULAR; INTRAVENOUS ONCE AS NEEDED
Status: DISCONTINUED | OUTPATIENT
Start: 2025-08-08 | End: 2025-08-08 | Stop reason: HOSPADM

## 2025-08-08 RX ORDER — INSULIN LISPRO 100 [IU]/ML
0-5 INJECTION, SOLUTION INTRAVENOUS; SUBCUTANEOUS
Status: DISPENSED | OUTPATIENT
Start: 2025-08-08

## 2025-08-08 RX ORDER — SODIUM CHLORIDE, SODIUM LACTATE, POTASSIUM CHLORIDE, CALCIUM CHLORIDE 600; 310; 30; 20 MG/100ML; MG/100ML; MG/100ML; MG/100ML
100 INJECTION, SOLUTION INTRAVENOUS CONTINUOUS
Status: DISCONTINUED | OUTPATIENT
Start: 2025-08-08 | End: 2025-08-08 | Stop reason: HOSPADM

## 2025-08-08 RX ORDER — FENTANYL CITRATE 50 UG/ML
INJECTION, SOLUTION INTRAMUSCULAR; INTRAVENOUS AS NEEDED
Status: DISCONTINUED | OUTPATIENT
Start: 2025-08-08 | End: 2025-08-08

## 2025-08-08 RX ORDER — DIPHENHYDRAMINE HYDROCHLORIDE 50 MG/ML
25 INJECTION, SOLUTION INTRAMUSCULAR; INTRAVENOUS ONCE AS NEEDED
Status: DISCONTINUED | OUTPATIENT
Start: 2025-08-08 | End: 2025-08-08 | Stop reason: HOSPADM

## 2025-08-08 RX ORDER — MORPHINE SULFATE 2 MG/ML
2 INJECTION, SOLUTION INTRAMUSCULAR; INTRAVENOUS EVERY 4 HOURS PRN
Status: DISPENSED | OUTPATIENT
Start: 2025-08-08

## 2025-08-08 RX ORDER — CIPROFLOXACIN 2 MG/ML
INJECTION, SOLUTION INTRAVENOUS AS NEEDED
Status: DISCONTINUED | OUTPATIENT
Start: 2025-08-08 | End: 2025-08-08

## 2025-08-08 RX ORDER — ALBUTEROL SULFATE 0.83 MG/ML
2.5 SOLUTION RESPIRATORY (INHALATION) ONCE AS NEEDED
Status: DISCONTINUED | OUTPATIENT
Start: 2025-08-08 | End: 2025-08-08 | Stop reason: HOSPADM

## 2025-08-08 RX ORDER — SODIUM CHLORIDE 0.9 G/100ML
INJECTION, SOLUTION IRRIGATION AS NEEDED
Status: DISCONTINUED | OUTPATIENT
Start: 2025-08-08 | End: 2025-08-08 | Stop reason: HOSPADM

## 2025-08-08 RX ORDER — BUPIVACAINE HCL/EPINEPHRINE 0.5-1:200K
VIAL (ML) INJECTION AS NEEDED
Status: DISCONTINUED | OUTPATIENT
Start: 2025-08-08 | End: 2025-08-08 | Stop reason: HOSPADM

## 2025-08-08 RX ORDER — MEPERIDINE HYDROCHLORIDE 25 MG/ML
12.5 INJECTION INTRAMUSCULAR; INTRAVENOUS; SUBCUTANEOUS EVERY 10 MIN PRN
Status: DISCONTINUED | OUTPATIENT
Start: 2025-08-08 | End: 2025-08-08 | Stop reason: HOSPADM

## 2025-08-08 RX ORDER — ACETAMINOPHEN 325 MG/1
650 TABLET ORAL EVERY 4 HOURS PRN
Status: DISCONTINUED | OUTPATIENT
Start: 2025-08-08 | End: 2025-08-08 | Stop reason: HOSPADM

## 2025-08-08 RX ADMIN — CIPROFLOXACIN 400 MG: 2 INJECTION, SOLUTION INTRAVENOUS at 08:50

## 2025-08-08 RX ADMIN — SODIUM CHLORIDE, SODIUM LACTATE, POTASSIUM CHLORIDE, AND CALCIUM CHLORIDE: .6; .31; .03; .02 INJECTION, SOLUTION INTRAVENOUS at 10:32

## 2025-08-08 RX ADMIN — TRAMADOL HYDROCHLORIDE 50 MG: 50 TABLET, COATED ORAL at 18:06

## 2025-08-08 RX ADMIN — ACETAMINOPHEN 650 MG: 325 TABLET ORAL at 13:06

## 2025-08-08 RX ADMIN — METRONIDAZOLE 500 MG: 500 INJECTION, SOLUTION INTRAVENOUS at 04:56

## 2025-08-08 RX ADMIN — FENTANYL CITRATE 50 MCG: 50 INJECTION, SOLUTION INTRAMUSCULAR; INTRAVENOUS at 10:37

## 2025-08-08 RX ADMIN — METRONIDAZOLE 500 MG: 500 INJECTION, SOLUTION INTRAVENOUS at 13:06

## 2025-08-08 RX ADMIN — LIDOCAINE HYDROCHLORIDE 100 MG: 20 INJECTION, SOLUTION INFILTRATION; PERINEURAL at 10:37

## 2025-08-08 RX ADMIN — ENOXAPARIN SODIUM 40 MG: 100 INJECTION SUBCUTANEOUS at 21:04

## 2025-08-08 RX ADMIN — CIPROFLOXACIN 400 MG: 400 INJECTION, SOLUTION INTRAVENOUS at 10:35

## 2025-08-08 RX ADMIN — ONDANSETRON 4 MG: 2 INJECTION INTRAMUSCULAR; INTRAVENOUS at 10:53

## 2025-08-08 RX ADMIN — DEXAMETHASONE SODIUM PHOSPHATE 4 MG: 4 INJECTION, SOLUTION INTRAMUSCULAR; INTRAVENOUS at 10:53

## 2025-08-08 RX ADMIN — HYDRALAZINE HYDROCHLORIDE 10 MG: 20 INJECTION INTRAMUSCULAR; INTRAVENOUS at 13:06

## 2025-08-08 RX ADMIN — ROCURONIUM BROMIDE 40 MG: 10 INJECTION, SOLUTION INTRAVENOUS at 10:37

## 2025-08-08 RX ADMIN — ACETAMINOPHEN 650 MG: 325 TABLET ORAL at 17:44

## 2025-08-08 RX ADMIN — FENTANYL CITRATE 50 MCG: 50 INJECTION, SOLUTION INTRAMUSCULAR; INTRAVENOUS at 10:39

## 2025-08-08 RX ADMIN — SUGAMMADEX 200 MG: 100 INJECTION, SOLUTION INTRAVENOUS at 11:21

## 2025-08-08 RX ADMIN — MORPHINE SULFATE 2 MG: 2 INJECTION, SOLUTION INTRAMUSCULAR; INTRAVENOUS at 22:05

## 2025-08-08 RX ADMIN — PROPOFOL 120 MG: 10 INJECTION, EMULSION INTRAVENOUS at 10:37

## 2025-08-08 RX ADMIN — Medication 100 MCG: at 10:45

## 2025-08-08 RX ADMIN — PANTOPRAZOLE SODIUM 40 MG: 40 INJECTION, POWDER, FOR SOLUTION INTRAVENOUS at 04:56

## 2025-08-08 RX ADMIN — Medication: at 11:45

## 2025-08-08 RX ADMIN — METRONIDAZOLE 500 MG: 500 INJECTION, SOLUTION INTRAVENOUS at 23:48

## 2025-08-08 RX ADMIN — CIPROFLOXACIN 400 MG: 2 INJECTION, SOLUTION INTRAVENOUS at 21:04

## 2025-08-08 ASSESSMENT — PAIN SCALES - GENERAL
PAINLEVEL_OUTOF10: 8
PAINLEVEL_OUTOF10: 2
PAINLEVEL_OUTOF10: 5 - MODERATE PAIN
PAINLEVEL_OUTOF10: 0 - NO PAIN
PAINLEVEL_OUTOF10: 8
PAINLEVEL_OUTOF10: 0 - NO PAIN
PAINLEVEL_OUTOF10: 4
PAINLEVEL_OUTOF10: 2
PAIN_LEVEL: 0
PAINLEVEL_OUTOF10: 0 - NO PAIN
PAINLEVEL_OUTOF10: 0 - NO PAIN
PAINLEVEL_OUTOF10: 8

## 2025-08-08 ASSESSMENT — PAIN - FUNCTIONAL ASSESSMENT
PAIN_FUNCTIONAL_ASSESSMENT: 0-10
PAIN_FUNCTIONAL_ASSESSMENT: 0-10
PAIN_FUNCTIONAL_ASSESSMENT: WONG-BAKER FACES
PAIN_FUNCTIONAL_ASSESSMENT: 0-10
PAIN_FUNCTIONAL_ASSESSMENT: VAS (VISUAL ANALOG SCALE)
PAIN_FUNCTIONAL_ASSESSMENT: 0-10

## 2025-08-08 ASSESSMENT — COLUMBIA-SUICIDE SEVERITY RATING SCALE - C-SSRS
6. HAVE YOU EVER DONE ANYTHING, STARTED TO DO ANYTHING, OR PREPARED TO DO ANYTHING TO END YOUR LIFE?: NO
2. HAVE YOU ACTUALLY HAD ANY THOUGHTS OF KILLING YOURSELF?: NO
1. IN THE PAST MONTH, HAVE YOU WISHED YOU WERE DEAD OR WISHED YOU COULD GO TO SLEEP AND NOT WAKE UP?: NO

## 2025-08-08 ASSESSMENT — COGNITIVE AND FUNCTIONAL STATUS - GENERAL
TOILETING: A LITTLE
DAILY ACTIVITIY SCORE: 19
CLIMB 3 TO 5 STEPS WITH RAILING: A LOT
WALKING IN HOSPITAL ROOM: A LITTLE
STANDING UP FROM CHAIR USING ARMS: A LITTLE
MOVING FROM LYING ON BACK TO SITTING ON SIDE OF FLAT BED WITH BEDRAILS: A LITTLE
DRESSING REGULAR LOWER BODY CLOTHING: A LITTLE
CLIMB 3 TO 5 STEPS WITH RAILING: A LOT
TOILETING: A LITTLE
DRESSING REGULAR UPPER BODY CLOTHING: A LITTLE
MOVING TO AND FROM BED TO CHAIR: A LITTLE
MOVING FROM LYING ON BACK TO SITTING ON SIDE OF FLAT BED WITH BEDRAILS: A LITTLE
PERSONAL GROOMING: A LITTLE
STANDING UP FROM CHAIR USING ARMS: A LITTLE
DRESSING REGULAR UPPER BODY CLOTHING: A LITTLE
HELP NEEDED FOR BATHING: A LITTLE
TURNING FROM BACK TO SIDE WHILE IN FLAT BAD: A LITTLE
DAILY ACTIVITIY SCORE: 19
MOBILITY SCORE: 17
DRESSING REGULAR LOWER BODY CLOTHING: A LITTLE
WALKING IN HOSPITAL ROOM: A LITTLE
MOBILITY SCORE: 17
MOVING TO AND FROM BED TO CHAIR: A LITTLE
TURNING FROM BACK TO SIDE WHILE IN FLAT BAD: A LITTLE
PERSONAL GROOMING: A LITTLE
HELP NEEDED FOR BATHING: A LITTLE

## 2025-08-08 ASSESSMENT — PAIN DESCRIPTION - ORIENTATION: ORIENTATION: RIGHT

## 2025-08-08 ASSESSMENT — PAIN DESCRIPTION - LOCATION
LOCATION: ABDOMEN

## 2025-08-08 NOTE — PROGRESS NOTES
Physical Therapy                 Therapy Communication Note    Patient Name: Jessy Masterson  MRN: [id number]  Department: [facility]  Room: [facility]  Today's Date: 8/8/2025     Discipline: Physical Therapy    Missed Visit: PT Missed Visit: Yes     Missed Visit Reason: Missed Visit Reason: Patient in a medical procedure (Patient currently off division in OR for cholecystectomy.  PT to defer.)    Missed Time: Attempt    Comment:

## 2025-08-08 NOTE — PROGRESS NOTES
Occupational Therapy                 Therapy Communication Note    Patient Name: Jessy Masterson  MRN: [id number]  Department: [facility] OR  Room: [facility] OR  Today's Date: 8/8/2025     Discipline: Occupational Therapy    Missed Visit: OT Missed Visit: Yes     Missed Visit Reason: Missed Visit Reason: Patient in a medical procedure (Pt currently off floor for  a laparoscopic cholecystectomy.)    Missed Time: Attempt

## 2025-08-08 NOTE — INTERVAL H&P NOTE
Patient's History and Physical was reviewed, and since general surgery was consulted patient has undergone ERCP for CBD clearance.     Plan:    - The procedure was explained once again. Questions were sought and answered.   - The risks and benefit of the procedure were discussed. The patient is willing proceed.   - Consent was reviewed and signed.   - Further recommendations after the procedure.     Discussed with Dr. Onders Mackenzie A Simerlink, MD  PGY-5 General Surgery  [facility] [id number]

## 2025-08-08 NOTE — PROGRESS NOTES
Jessy Masterson is a 89 y.o. female     Status post cholecystectomy  Resting comfortably    Review of Systems     Constitutional: no fever, no chills, not feeling poorly, not feeling tired   Cardiovascular: no chest pain   Respiratory: no cough, wheezing or shortness of breath a  Gastrointestinal: no abdominal pain, no constipation, no melena, no nausea, no diarrhea, no vomiting and no blood in stools.   Neurological: no headache,   All other systems have been reviewed and are negative for complaint.       Vitals:    08/08/25 1248   BP: 180/62   Pulse: 80   Resp: 20   Temp: 36.9 °C (98.4 °F)   SpO2: 98%        Scheduled medications  Scheduled Medications[1]  Continuous medications  Continuous Medications[2]  PRN medications  PRN Medications[3]    Lab Review   Results from last 7 days   Lab Units 08/08/25  0537 08/07/25  0741 08/06/25  0710   WBC AUTO x10*3/uL 7.4 6.8 5.5   HEMOGLOBIN g/dL 11.7* 13.2 12.0   HEMATOCRIT % 35.6* 38.5 35.5*   PLATELETS AUTO x10*3/uL 311 344 277     Results from last 7 days   Lab Units 08/08/25  0537 08/07/25  0741 08/06/25  0710   SODIUM mmol/L 139 138 138   POTASSIUM mmol/L 3.4* 4.1 3.6   CHLORIDE mmol/L 105 101 106   CO2 mmol/L 27 23 22   BUN mg/dL 23 16 14   CREATININE mg/dL 1.03 0.84 0.74   CALCIUM mg/dL 8.2* 8.4* 8.3*   PROTEIN TOTAL g/dL 5.6* 5.7* 5.7*   BILIRUBIN TOTAL mg/dL 1.6* 2.1* 2.6*   ALK PHOS U/L 286* 392* 356*   ALT U/L 101* 136* 153*   AST U/L 65* 71* 90*   GLUCOSE mg/dL 100* 119* 104*     Results from last 7 days   Lab Units 08/04/25  1728   TROPHS ng/L 19*        FL GI ERCP   Final Result            Physical Exam    Constitutional   General appearance: Alert and in no acute distress.   Pulmonary   Respiratory assessment: No respiratory distress, normal respiratory rhythm and effort.    Auscultation of Lungs: Clear bilateral breath sounds.   Cardiovascular   Auscultation of heart: Apical pulse normal, heart rate and rhythm normal, normal S1 and S2, no  murmurs and no pericardial rub.    Exam for edema: No peripheral edema.   Abdomen   Abdominal Exam: No bruits, normal bowel sounds, soft, non-tender, no abdominal mass palpated.    Liver and Spleen exam: No hepato-splenomegaly.   Musculoskeletal     Inspection/palpation of joints, bones and muscles: No joint swelling. Normal movement of all extremities.   Neurologic   Cranial nerves: Nerves 2-12 were intact, no focal neuro defects.         Assessment/Plan      #Acute choledocholithiasis  #Transaminitis    Status postcholecystectomy  Stable  Should be okay to go home tomorrow if cleared by surgery  Will go on oral ciprofloxacin and Flagyl for 1 more week     #Hypertension  Holding blood pressure medicines  Will use IV hydralazine as needed             [1] ciprofloxacin, 400 mg, intravenous, q12h  enoxaparin, 40 mg, subcutaneous, q24h  metroNIDAZOLE, 500 mg, intravenous, q8h  pantoprazole, 40 mg, oral, Daily before breakfast   Or  pantoprazole, 40 mg, intravenous, Daily before breakfast     [2]    [3] PRN medications: acetaminophen **OR** acetaminophen **OR** acetaminophen, guaiFENesin, hydrALAZINE, morphine, ondansetron **OR** ondansetron, polyethylene glycol

## 2025-08-08 NOTE — ANESTHESIA PREPROCEDURE EVALUATION
Patient: Jessy Masterson    Procedure Information       Date/Time: 08/08/25 0955    Procedure: CHOLECYSTECTOMY, LAPAROSCOPIC    Location: [facility] OR 18 / [facility] OR    Surgeons: Vivek Kirkland MD            Relevant Problems   Cardiac  Orthostatic hypotension   (+) Abnormal EKG   (+) HTN (hypertension)      Neuro  Optic neuropathy   (+) Dementia   (+) Depression, recurrent      GI   (+) Gastroesophageal reflux disease without esophagitis      Liver  Choledocholithiasis  Dilated CBD  ERCP performed earlier this week  Hypoalbuminemia 3.0   (+) Choledocholithiasis   (+) Elevated liver function tests      Endocrine   (+) Class 1 obesity due to excess calories with serious comorbidity and body mass index (BMI) of 32.0 to 32.9 in adult      Hematology   (+) Anemia (Slight  )      HEENT   (+) Asymmetrical sensorineural hearing loss   (+) Chronic open angle glaucoma of both eyes, moderate stage   (+) Glaucoma   (+) Hearing loss, right   (+) Primary open angle glaucoma   (+) Primary open angle glaucoma (POAG) of right eye, mild stage   (+) Sensorineural hearing loss, bilateral                                                         Pre-Anesthesia Evaluation                                         Jessy Masterson is a 89 y.o. female who presents for the above mentioned procedure due to Choledocholithiasis [K80.50]    Medical History[1]  Surgical History[2]  Social History[3]  RX Allergies[4]  Current Medications[5]  Prior to Admission medications   Medication Sig Start Date End Date Taking? Authorizing Provider   cholecalciferol (Vitamin D-3) 25 MCG (1000 UT) capsule Take by mouth.   Yes Historical Provider, MD   cyanocobalamin (Vitamin B-12) 1,000 mcg tablet Take 1 tablet (1,000 mcg) by mouth once daily.   Yes Historical Provider, MD   latanoprost (Xalatan) 0.005 % ophthalmic solution instill one drop in each eye at bedtime   Yes Historical Provider, MD   lisinopril 40 mg tablet TAKE ONE TABLET  "BY MOUTH ONCE A DAY 11/27/24  Yes Hank Savage MD   omeprazole (PriLOSEC) 40 mg DR capsule Take 1 capsule (40 mg) by mouth once daily in the morning. Take before meals. Do not crush or chew. 7/16/25 8/15/25 Yes Angelita Uriarte V, DO   CRANBERRY ORAL Take by mouth. 1/10/23 8/4/25  Historical Provider, MD     No medication comments found.    Visit Vitals  /80   Pulse 83   Temp 36.7 °C (98.1 °F) (Temporal)   Resp 18   Ht (!) 1.524 m (5')   Wt 74.8 kg (164 lb 14.5 oz)   SpO2 96%   BMI 32.21 kg/m²   Smoking Status Never   BSA 1.78 m²                Medical Gas Therapy: None (Room air)  Medical Gas Delivery Method: Simple mask        BP Readings from Last 4 Encounters:   08/08/25 169/80   08/04/25 118/73   07/16/25 141/80   06/24/25 108/72      If applicable:  No results found for: \"PREGTESTUR\", \"PREGSERUM\", \"HCG\", \"HCGQUANT\"        Recent Labs     08/08/25  0537 08/07/25  0741   WBC 7.4 6.8   HGB 11.7* 13.2   HCT 35.6* 38.5    344   MCV 97 93     Recent Labs     08/08/25  0537 08/07/25  0741   EGFR 52* 67   ANIONGAP 10 18   BUN 23 16   CREATININE 1.03 0.84    138   K 3.4* 4.1    101   CO2 27 23   GLUCOSE 100* 119*   CALCIUM 8.2* 8.4*     Recent Labs     08/04/25  1728 04/21/22  1510   TSH  --  3.05   TROPHS 19*  --      Recent Labs     08/08/25  0537 08/07/25  0741 08/05/25  0537 08/04/25  1300 03/30/22  0635 03/29/22  0000   BILITOT 1.6* 2.1*   < > 8.0*   < >  --    PROT 5.6* 5.7*   < > 6.4   < >  --    ALBUMIN 3.0* 3.4   < > 3.8   < >  --    ALKPHOS 286* 392*   < > 518*   < >  --    * 136*   < > 295*   < >  --    AST 65* 71*   < > 238*   < >  --    LIPASE  --   --   --  12  --  43    < > = values in this interval not displayed.     Recent Labs     08/04/25  1728   PROTIME 12.6*   INR 1.1     Lab Results   Component Value Date    ABO A 08/04/2025    LABRH NEG 08/04/2025    ABSCRN NEG 08/04/2025     No results found for: \"FERRITIN\", \"TIBC\", \"IRONSAT\"  No results found for: \"STAPHMRSASCR\"  No " "results for input(s): \"AMPHETAMINE\", \"MAMPHBLDS\", \"BARBITURATE\", \"BENZODIAZ\", \"BUPRENBLDS\", \"CANNABBLDS\", \"COCBLDS\", \"METHABLDS\", \"OXYBLDS\", \"PCPBLDS\", \"OPIATBLDS\", \"DRBLDCOMM\" in the last 38168 hours.  No results for input(s): \"PHART\", \"LLM3QTL\", \"PO2ART\", \"VVQ3YIQ\", \"K0RBQBYX\", \"BEART\", \"T1ZHMDDH\" in the last 99815 hours.    EKG   Encounter Date: 08/04/25   ECG 12 lead   Result Value    Ventricular Rate 82    Atrial Rate 82    MT Interval 158    QRS Duration 82    QT Interval 402    QTC Calculation(Bazett) 469    P Axis 72    R Axis -2    T Axis 34    QRS Count 13    Q Onset 218    P Onset 139    P Offset 188    T Offset 419    QTC Fredericia 446    Narrative    Sinus rhythm with occasional Premature ventricular complexes  Cannot rule out Anterior infarct , age undetermined  Abnormal ECG  When compared with ECG of 28-MAR-2022 19:55,  PREVIOUS ECG IS PRESENT      Ejection Fractions:No results found for: \"EF\"  Echocardiogram   Results for orders placed in visit on 01/21/21    Echocardiogram    [facility], [address] [place] [id number] [phone] and  Fax [phone]    TRANSTHORACIC ECHOCARDIOGRAM REPORT      Patient Name:     RAMONA Lynn Physician:   [id number] Hank Savage MD, [facility]  Study Date:       1/21/2021            Referring Physician: HANK SAVAGE  MRN/PID:          [id number]             PCP:                 [id number] MD  Accession/Order#: [id number]         Department Location: [facility]  YOB: 1935            Fellow:  Gender:           F                    Nurse:  Admit Date:       1/21/2021            Sonographer:         Katherine Alva \"RDCS,  RCS\"  Admission Status: Outpatient           Additional Staff:  Height:           154.94 cm            CC Report to:  Weight:           69.85 kg             Study Type:          Echocardiogram  BSA:              1.69 m2  Blood Pressure: 140 /68 mmHg    Diagnosis/ICD: R94.31-Abnormal " electrocardiogram [ECG] [EKG]; R07.89-Other chest  pain; I10-Essential (primary) hypertension  Indication:    Abnormal EKG, Chest Pain, Hypertension  Procedure/CPT: Echo Complete w Full Doppler-[id number]  Study Detail: The following Echo studies were performed: 2D, M-Mode, Doppler and  color flow. Agitated saline used as a contrast agent for  intraseptal flow evaluation. Total contrast used for this  procedure was 10 mL via IV push.      PHYSICIAN INTERPRETATION:  Left Ventricle: The left ventricular systolic function is normal, with an estimated ejection fraction of 65-70%. There are no regional wall motion abnormalities. The left ventricular cavity size is decreased. There is moderate left ventricular hypertrophy. Spectral Doppler shows an impaired relaxation pattern of left ventricular diastolic filling.  Left Atrium: The left atrium is mildly dilated.  Right Ventricle: The right ventricle is normal in size. There is normal right ventricular global systolic function.  Right Atrium: The right atrium is normal in size.  Aortic Valve: The aortic valve appears structurally normal. There is no evidence of aortic valve regurgitation. The peak instantaneous gradient of the aortic valve is 11.0 mmHg. The mean gradient of the aortic valve is 5.8 mmHg.  Mitral Valve: The mitral valve is normal in structure. There is mild mitral valve regurgitation.  Tricuspid Valve: The tricuspid valve is structurally normal. There is mild tricuspid regurgitation.  Pulmonic Valve: The pulmonic valve is structurally normal. There is mild pulmonic valve regurgitation.  Pericardium: There is no pericardial effusion noted.  Aorta: The aortic root is normal.  Pulmonary Artery: The main pulmonary artery is normal in size, and position, with normal bifurcation into the left and right pulmonary arteries.  Systemic Veins: The inferior vena cava appears to be of normal size.      CONCLUSIONS:  1. The left ventricular systolic function is normal with a  65-70% estimated ejection fraction.  2. Spectral Doppler shows an impaired relaxation pattern of left ventricular diastolic filling.  3. There is moderate left ventricular hypertrophy.  4. The left ventricular cavity size is decreased.  5. Normal right ventricular systolic pressure.    QUANTITATIVE DATA SUMMARY:  2D MEASUREMENTS:  Normal Ranges:  LAs:           2.86 cm   (2.7-4.0cm)  RVIDd:         2.71 cm   (0.9-3.6cm)  IVSd:          1.11 cm   (0.6-1.1cm)  LVPWd:         1.11 cm   (0.6-1.1cm)  LVIDd:         3.66 cm   (3.9-5.9cm)  LVIDs:         2.40 cm  LV Mass Index: 75.9 g/m2  LV % FS        34.4 %    LA VOLUME:  Normal Ranges:  LA Area A4C:     14.8 cm2  LA Area A2C:     15.6 cm2  LA Volume Index: 25.4 ml/m2  LA Vol A4C:      40.2 ml  LA Vol A2C:      34.8 ml    RA VOLUME BY A/L METHOD:  Normal Ranges:  RA Area A4C: 9.6 cm2    M-MODE MEASUREMENTS:  Normal Ranges:  AoV Exc: 1.40 cm (1.5-2.5cm)    AORTA MEASUREMENTS:  Normal Ranges:  AoV Exc: 1.40 cm (1.5-2.5cm)    LV SYSTOLIC FUNCTION BY 2D PLANIMETRY (MOD):  Normal Ranges:  EF-A4C View: 70.8 % (>55%)  EF-A2C View: 66.2 %  EF-Biplane:  68.1 %    LV DIASTOLIC FUNCTION:  Normal Ranges:  MV Peak E: 0.78 m/s (0.7-1.2 m/s)  MV Peak A: 1.37 m/s (0.42-0.7 m/s)  E/A Ratio: 0.57     (1.0-2.2)    MITRAL VALVE:  Normal Ranges:  MV DT: 266 msec (150-240msec)    AORTIC VALVE:  Normal Ranges:  AoV Vmax:                1.66 m/s  (<1.7m/s)  AoV Peak P.0 mmHg (<20mmHg)  AoV Mean P.8 mmHg  (1.7-11.5mmHg)  LVOT Max Augustus:            1.06 m/s  (<1.1m/s)  AoV VTI:                 36.44 cm  (18-25cm)  LVOT VTI:                26.36 cm  LVOT Diameter:           2.10 cm   (1.8-2.4cm)  AoV Area, VTI:           2.50 cm2  (2.5-5.5cm2)  AoV Area,Vmax:           2.22 cm2  (2.5-4.5cm2)  AoV Dimensionless Index: 0.72    RIGHT VENTRICLE:  RV 1   2.8 cm  RV 2   2.4 cm  RV 3   5.9 cm  TAPSE: 22.0 mm  RV s'  0.16 m/s    TRICUSPID VALVE/RVSP:  Normal Ranges:  Peak  "TR Velocity: 2.46 m/s  RV Syst Pressure: 27.1 mmHg (< 30mmHg)    AORTA:  Asc Ao Diam 2.74 cm      [id number] Hank Savage MD, FACC  Electronically signed on 1/28/2021 at 5:56:09 PM            Stress TestingNo results found for this or any previous visit from the past 94512 days.   No results found for this or any previous visit from the past 365 days.   No results found for this or any previous visit from the past 365 days.     Cardiac Catheterization  No results found for this or any previous visit from the past 365 days.   No results found for this or any previous visit from the past 365 days.   No results found for this or any previous visit from the past 365 days.     Right Heart Cath No results found for this or any previous visit from the past 365 days.    Coronary Artery Calcium Score No results found for this or any previous visit from the past 365 days.     AAA screen No results found for this or any previous visit from the past 365 days.     Carotid DopplerNo results found for this or any previous visit from the past 83849 days.      X Ray === 02/13/24 ===    XR KNEE 3 VIEWS LEFT    - Impression -  Degenerative changes.    Trace left suprapatellar effusion.      MACRO:  None    Signed by: Jessica Agrawal 2/14/2024 5:59 PM  Dictation workstation:   [id number]  Pulmonary Function Tests   No results found for this or any previous visit from the past 365 days.    No results found for: \"FEV1\", \"FVC\", \"VWA6TSG\", \"TLC\", \"DLCO\"   OTHER: No results found for this or any previous visit from the past 1825 days.    The ASCVD Risk score (Jayshree TOPETE, et al., 2019) failed to calculate for the following reasons:    The 2019 ASCVD risk score is only valid for ages 40 to 79    Code Status: Full Code                   Clinical information reviewed:   Tobacco  Allergies  Meds   Med Hx  Surg Hx   Fam Hx  Soc Hx        NPO Detail:  No data recorded     Physical Exam    Airway  Mallampati: II     Cardiovascular    Dental      "   Pulmonary    Abdominal            Anesthesia Plan    History of general anesthesia?: yes  History of complications of general anesthesia?: no    ASA 3     general     intravenous induction   Anesthetic plan and risks discussed with patient.                 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   • Rectal bleeding 05/19/2023   [2]  Past Surgical History:  Procedure Laterality Date   • BLADDER SUSPENSION     • CATARACT EXTRACTION W/  INTRAOCULAR LENS IMPLANT     • SINUS SURGERY     • TONSILLECTOMY     • TOTAL ABDOMINAL HYSTERECTOMY W/ BILATERAL SALPINGOOPHORECTOMY     [3]  Social History  Tobacco Use   • Smoking status: Never   • Smokeless tobacco: Never   Vaping Use   • Vaping status: Never Used   Substance Use Topics   • Alcohol use: Not Currently     Comment: occasional alcohol   • Drug use: Never   [4]  Allergies  Allergen Reactions   • Aspirin Other     upset stomach   • Ibuprofen Unknown     motrin   • Penicillins Hives, Rash and Unknown     tolerated ceftriaxone 2022     [5]    Current Facility-Administered Medications:   •  acetaminophen (Tylenol) tablet 650 mg, 650 mg, oral, q4h PRN **OR** acetaminophen (Tylenol) oral liquid 650 mg, 650 mg, oral, q4h PRN **OR** acetaminophen (Tylenol) suppository 650 mg, 650 mg, rectal, q4h PRN, Chana Whelan MD  •  ciprofloxacin (Cipro) 400 mg in dextrose 5%  mL, 400 mg, intravenous, q12h, Chana Whelan MD, Stopped at 08/07/25 2158  •  enoxaparin (Lovenox) syringe 40 mg, 40 mg, subcutaneous, q24h, Chana Whelan MD, 40 mg at 08/07/25 2038  •  guaiFENesin (Mucinex) 12 hr tablet 600 mg, 600 mg, oral, q12h PRN, Chana Whelan MD  •  hydrALAZINE (Apresoline) injection 10 mg, 10 mg, intravenous, q8h PRN, Chana Whelan MD, 10 mg at 08/07/25 0506  •  HYDROmorphone (Dilaudid)  injection 0.5 mg, 0.5 mg, intravenous, q5 min PRN, Douglas Cerna MD  •  HYDROmorphone PF (Dilaudid) injection 0.2 mg, 0.2 mg, intravenous, q5 min PRN, Douglas Cerna MD  •  lidocaine PF (Xylocaine) 10 mg/mL (1 %) injection 1 mg, 0.1 mL, subcutaneous, Once, Douglas Cerna MD  •  meperidine (PF) (Demerol) injection 12.5 mg, 12.5 mg, intravenous, q10 min PRN, Douglas Cerna MD  •  metoclopramide (Reglan) injection 10 mg, 10 mg, intravenous, Once PRN, Douglas Cerna MD  •  metroNIDAZOLE (Flagyl) 500 mg in sodium chloride (iso)  mL, 500 mg, intravenous, q8h, Chana Whelan MD, Stopped at 08/08/25 0500  •  morphine injection 2 mg, 2 mg, intravenous, q4h PRN, Chana Whelan MD  •  ondansetron (Zofran) tablet 4 mg, 4 mg, oral, q8h PRN **OR** ondansetron (Zofran) injection 4 mg, 4 mg, intravenous, q8h PRN, Chana Whelan MD, 4 mg at 08/06/25 1755  •  ondansetron (Zofran) injection 4 mg, 4 mg, intravenous, Once PRN, Douglas Cerna MD  •  oxyCODONE (Roxicodone) immediate release tablet 5 mg, 5 mg, oral, q4h PRN, Douglas Cerna MD  •  oxygen (O2) therapy, , inhalation, Continuous - O2/gases, Douglas Cerna MD  •  pantoprazole (ProtoNix) EC tablet 40 mg, 40 mg, oral, Daily before breakfast, 40 mg at 08/06/25 0602 **OR** pantoprazole (Protonix) injection 40 mg, 40 mg, intravenous, Daily before breakfast, Chana Whelan MD, 40 mg at 08/08/25 0456  •  polyethylene glycol (Glycolax, Miralax) packet 17 g, 17 g, oral, Daily PRN, Chana Whelan MD

## 2025-08-08 NOTE — ANESTHESIA PROCEDURE NOTES
Airway  Date/Time: 8/8/2025 10:42 AM  Reason: elective    Airway not difficult    Staffing  Performed: CRNA   Authorized by: Douglas Cerna MD    Performed by: BRIDGETTE Varma-EVELYN  Patient location during procedure: OR    Patient Condition  Indications for airway management: anesthesia  Patient position: sniffing  MILS maintained throughout  Sedation level: no sedation     Final Airway Details   Preoxygenated: yes  Final airway type: endotracheal airway  Successful airway: ETT  Cuffed: yes   Successful intubation technique: direct laryngoscopy  Adjuncts used in placement: intubating stylet and cricoid pressure  Endotracheal tube insertion site: oral  Blade: Susy  Blade size: #3  ETT size (mm): 7.0  Cormack-Lehane Classification: grade IIa - partial view of glottis  Placement verified by: chest auscultation and capnometry   Measured from: lips  ETT to lips (cm): 21  Number of attempts at approach: 1    Additional Comments  Small mouth short chin  head lift with cricoid bottom of cords seen

## 2025-08-08 NOTE — SIGNIFICANT EVENT
S/p ERCP yesterday with Dr. Florian.    Findings  The major papilla was native. The major papilla had a bulging appearance. The major papilla was emanating pus. The common bile duct was deeply cannulated after 3 attempts using a traction sphincterotome with guidewire. Cannulation was not difficult and no bleeding was observed. Complete large major papilla sphincterotomy was performed using a sphincterotome. No bleeding was noted at the procedure site. An initial contrast injection was performed successfully in the common bile duct, common hepatic duct, left main hepatic duct and right main hepatic duct. Ductal flow was adequate. The study's image quality was acceptable. Multiple filling defects suggestive of stones measuring 10 mm were visualized in the common bile duct. Performed major papilla sphincteroplasty. The dilator was expanded from 13 mm starting size to 15 mm ending size. StoneMaster was used to perform sphincterotomy and sphincteroplasty given the large size of stones. Multiple sweeps were performed in the common bile duct and common hepatic duct using a 11.5 mm balloon. Stones were removed, achieving complete clearance. At lease five large stones were removed.   LFTs and bilirubin trending down today.     Recommendation  -Cholecystectomy per general surgery. Plans for LC tomorrow  -Continue antibiotics per primary team given acute ascending cholangitis   -GI will sign off.  Please reach out if any questions or further assistance needed      Case discussed with Dr. Hill

## 2025-08-08 NOTE — ANESTHESIA PREPROCEDURE EVALUATION
Patient: Jessy Masterson    Procedure Information       Date/Time: 08/08/25 0955    Procedure: CHOLECYSTECTOMY, LAPAROSCOPIC    Location: [facility] OR 18 / [facility] OR    Surgeons: Vivek Kirkland MD            Relevant Problems   Cardiac  Orthostatic hypotension   (+) Abnormal EKG   (+) HTN (hypertension)      Neuro  Optic neuropathy   (+) Dementia   (+) Depression, recurrent      GI   (+) Gastroesophageal reflux disease without esophagitis      Liver  Choledocholithiasis  Dilated CBD  ERCP performed earlier this week  Hypoalbuminemia 3.0   (+) Choledocholithiasis   (+) Elevated liver function tests      Endocrine   (+) Class 1 obesity due to excess calories with serious comorbidity and body mass index (BMI) of 32.0 to 32.9 in adult      Hematology   (+) Anemia (Slight  )      HEENT   (+) Asymmetrical sensorineural hearing loss   (+) Chronic open angle glaucoma of both eyes, moderate stage   (+) Glaucoma   (+) Hearing loss, right   (+) Primary open angle glaucoma   (+) Primary open angle glaucoma (POAG) of right eye, mild stage   (+) Sensorineural hearing loss, bilateral                                                         Pre-Anesthesia Evaluation                                         Jessy Masterson is a 89 y.o. female who presents for the above mentioned procedure due to Choledocholithiasis [K80.50]    Medical History[1]  Surgical History[2]  Social History[3]  RX Allergies[4]  Current Medications[5]  Prior to Admission medications   Medication Sig Start Date End Date Taking? Authorizing Provider   cholecalciferol (Vitamin D-3) 25 MCG (1000 UT) capsule Take by mouth.   Yes Historical Provider, MD   cyanocobalamin (Vitamin B-12) 1,000 mcg tablet Take 1 tablet (1,000 mcg) by mouth once daily.   Yes Historical Provider, MD   latanoprost (Xalatan) 0.005 % ophthalmic solution instill one drop in each eye at bedtime   Yes Historical Provider, MD   lisinopril 40 mg tablet TAKE ONE TABLET  "BY MOUTH ONCE A DAY 11/27/24  Yes Hank Savage MD   omeprazole (PriLOSEC) 40 mg DR capsule Take 1 capsule (40 mg) by mouth once daily in the morning. Take before meals. Do not crush or chew. 7/16/25 8/15/25 Yes Angelita Uriarte V, DO   CRANBERRY ORAL Take by mouth. 1/10/23 8/4/25  Historical Provider, MD     No medication comments found.    Visit Vitals  /80   Pulse 83   Temp 36.7 °C (98.1 °F) (Temporal)   Resp 18   Ht (!) 1.524 m (5')   Wt 74.8 kg (164 lb 14.5 oz)   SpO2 96%   BMI 32.21 kg/m²   Smoking Status Never   BSA 1.78 m²                Medical Gas Therapy: None (Room air)  Medical Gas Delivery Method: Simple mask        BP Readings from Last 4 Encounters:   08/08/25 169/80   08/04/25 118/73   07/16/25 141/80   06/24/25 108/72      If applicable:  No results found for: \"PREGTESTUR\", \"PREGSERUM\", \"HCG\", \"HCGQUANT\"        Recent Labs     08/08/25  0537 08/07/25  0741   WBC 7.4 6.8   HGB 11.7* 13.2   HCT 35.6* 38.5    344   MCV 97 93     Recent Labs     08/08/25  0537 08/07/25  0741   EGFR 52* 67   ANIONGAP 10 18   BUN 23 16   CREATININE 1.03 0.84    138   K 3.4* 4.1    101   CO2 27 23   GLUCOSE 100* 119*   CALCIUM 8.2* 8.4*     Recent Labs     08/04/25  1728 04/21/22  1510   TSH  --  3.05   TROPHS 19*  --      Recent Labs     08/08/25  0537 08/07/25  0741 08/05/25  0537 08/04/25  1300 03/30/22  0635 03/29/22  0000   BILITOT 1.6* 2.1*   < > 8.0*   < >  --    PROT 5.6* 5.7*   < > 6.4   < >  --    ALBUMIN 3.0* 3.4   < > 3.8   < >  --    ALKPHOS 286* 392*   < > 518*   < >  --    * 136*   < > 295*   < >  --    AST 65* 71*   < > 238*   < >  --    LIPASE  --   --   --  12  --  43    < > = values in this interval not displayed.     Recent Labs     08/04/25  1728   PROTIME 12.6*   INR 1.1     Lab Results   Component Value Date    ABO A 08/04/2025    LABRH NEG 08/04/2025    ABSCRN NEG 08/04/2025     No results found for: \"FERRITIN\", \"TIBC\", \"IRONSAT\"  No results found for: \"STAPHMRSASCR\"  No " "results for input(s): \"AMPHETAMINE\", \"MAMPHBLDS\", \"BARBITURATE\", \"BENZODIAZ\", \"BUPRENBLDS\", \"CANNABBLDS\", \"COCBLDS\", \"METHABLDS\", \"OXYBLDS\", \"PCPBLDS\", \"OPIATBLDS\", \"DRBLDCOMM\" in the last 62847 hours.  No results for input(s): \"PHART\", \"PHB2XSX\", \"PO2ART\", \"NDP2QJB\", \"C5CHLXOH\", \"BEART\", \"J2OJPNEE\" in the last 89180 hours.    EKG   Encounter Date: 08/04/25   ECG 12 lead   Result Value    Ventricular Rate 82    Atrial Rate 82    NV Interval 158    QRS Duration 82    QT Interval 402    QTC Calculation(Bazett) 469    P Axis 72    R Axis -2    T Axis 34    QRS Count 13    Q Onset 218    P Onset 139    P Offset 188    T Offset 419    QTC Fredericia 446    Narrative    Sinus rhythm with occasional Premature ventricular complexes  Cannot rule out Anterior infarct , age undetermined  Abnormal ECG  When compared with ECG of 28-MAR-2022 19:55,  PREVIOUS ECG IS PRESENT      Ejection Fractions:No results found for: \"EF\"  Echocardiogram   Results for orders placed in visit on 01/21/21    Echocardiogram    [facility], [address] [place] [address] [id number] [phone] and  Fax [phone]    TRANSTHORACIC ECHOCARDIOGRAM REPORT      Patient Name:     RAMONA Lynn Physician:   [id number] Hank Savage MD, [facility]  Study Date:       1/21/2021            Referring Physician: HANK SAVAGE  MRN/PID:          [id number]             PCP:                 [id number] [address] MD  Accession/Order#: [id number]         Department Location: [facility]  YOB: 1935            Fellow:  Gender:           F                    Nurse:  Admit Date:       1/21/2021            Sonographer:         Katherine Alva \"RDCS,  RCS\"  Admission Status: Outpatient           Additional Staff:  Height:           154.94 cm            CC Report to:  Weight:           69.85 kg             Study Type:          Echocardiogram  BSA:              1.69 m2  Blood Pressure: 140 /68 mmHg    Diagnosis/ICD: R94.31-Abnormal " electrocardiogram [ECG] [EKG]; R07.89-Other chest  pain; I10-Essential (primary) hypertension  Indication:    Abnormal EKG, Chest Pain, Hypertension  Procedure/CPT: Echo Complete w Full Doppler-51632  Study Detail: The following Echo studies were performed: 2D, M-Mode, Doppler and  color flow. Agitated saline used as a contrast agent for  intraseptal flow evaluation. Total contrast used for this  procedure was 10 mL via IV push.      PHYSICIAN INTERPRETATION:  Left Ventricle: The left ventricular systolic function is normal, with an estimated ejection fraction of 65-70%. There are no regional wall motion abnormalities. The left ventricular cavity size is decreased. There is moderate left ventricular hypertrophy. Spectral Doppler shows an impaired relaxation pattern of left ventricular diastolic filling.  Left Atrium: The left atrium is mildly dilated.  Right Ventricle: The right ventricle is normal in size. There is normal right ventricular global systolic function.  Right Atrium: The right atrium is normal in size.  Aortic Valve: The aortic valve appears structurally normal. There is no evidence of aortic valve regurgitation. The peak instantaneous gradient of the aortic valve is 11.0 mmHg. The mean gradient of the aortic valve is 5.8 mmHg.  Mitral Valve: The mitral valve is normal in structure. There is mild mitral valve regurgitation.  Tricuspid Valve: The tricuspid valve is structurally normal. There is mild tricuspid regurgitation.  Pulmonic Valve: The pulmonic valve is structurally normal. There is mild pulmonic valve regurgitation.  Pericardium: There is no pericardial effusion noted.  Aorta: The aortic root is normal.  Pulmonary Artery: The main pulmonary artery is normal in size, and position, with normal bifurcation into the left and right pulmonary arteries.  Systemic Veins: The inferior vena cava appears to be of normal size.      CONCLUSIONS:  1. The left ventricular systolic function is normal with a  65-70% estimated ejection fraction.  2. Spectral Doppler shows an impaired relaxation pattern of left ventricular diastolic filling.  3. There is moderate left ventricular hypertrophy.  4. The left ventricular cavity size is decreased.  5. Normal right ventricular systolic pressure.    QUANTITATIVE DATA SUMMARY:  2D MEASUREMENTS:  Normal Ranges:  LAs:           2.86 cm   (2.7-4.0cm)  RVIDd:         2.71 cm   (0.9-3.6cm)  IVSd:          1.11 cm   (0.6-1.1cm)  LVPWd:         1.11 cm   (0.6-1.1cm)  LVIDd:         3.66 cm   (3.9-5.9cm)  LVIDs:         2.40 cm  LV Mass Index: 75.9 g/m2  LV % FS        34.4 %    LA VOLUME:  Normal Ranges:  LA Area A4C:     14.8 cm2  LA Area A2C:     15.6 cm2  LA Volume Index: 25.4 ml/m2  LA Vol A4C:      40.2 ml  LA Vol A2C:      34.8 ml    RA VOLUME BY A/L METHOD:  Normal Ranges:  RA Area A4C: 9.6 cm2    M-MODE MEASUREMENTS:  Normal Ranges:  AoV Exc: 1.40 cm (1.5-2.5cm)    AORTA MEASUREMENTS:  Normal Ranges:  AoV Exc: 1.40 cm (1.5-2.5cm)    LV SYSTOLIC FUNCTION BY 2D PLANIMETRY (MOD):  Normal Ranges:  EF-A4C View: 70.8 % (>55%)  EF-A2C View: 66.2 %  EF-Biplane:  68.1 %    LV DIASTOLIC FUNCTION:  Normal Ranges:  MV Peak E: 0.78 m/s (0.7-1.2 m/s)  MV Peak A: 1.37 m/s (0.42-0.7 m/s)  E/A Ratio: 0.57     (1.0-2.2)    MITRAL VALVE:  Normal Ranges:  MV DT: 266 msec (150-240msec)    AORTIC VALVE:  Normal Ranges:  AoV Vmax:                1.66 m/s  (<1.7m/s)  AoV Peak P.0 mmHg (<20mmHg)  AoV Mean P.8 mmHg  (1.7-11.5mmHg)  LVOT Max Augustus:            1.06 m/s  (<1.1m/s)  AoV VTI:                 36.44 cm  (18-25cm)  LVOT VTI:                26.36 cm  LVOT Diameter:           2.10 cm   (1.8-2.4cm)  AoV Area, VTI:           2.50 cm2  (2.5-5.5cm2)  AoV Area,Vmax:           2.22 cm2  (2.5-4.5cm2)  AoV Dimensionless Index: 0.72    RIGHT VENTRICLE:  RV 1   2.8 cm  RV 2   2.4 cm  RV 3   5.9 cm  TAPSE: 22.0 mm  RV s'  0.16 m/s    TRICUSPID VALVE/RVSP:  Normal Ranges:  Peak  "TR Velocity: 2.46 m/s  RV Syst Pressure: 27.1 mmHg (< 30mmHg)    AORTA:  Asc Ao Diam 2.74 cm      [id number] Hank Savage MD, FACC  Electronically signed on 1/28/2021 at 5:56:09 PM            Stress TestingNo results found for this or any previous visit from the past 36861 days.   No results found for this or any previous visit from the past 365 days.   No results found for this or any previous visit from the past 365 days.     Cardiac Catheterization  No results found for this or any previous visit from the past 365 days.   No results found for this or any previous visit from the past 365 days.   No results found for this or any previous visit from the past 365 days.     Right Heart Cath No results found for this or any previous visit from the past 365 days.    Coronary Artery Calcium Score No results found for this or any previous visit from the past 365 days.     AAA screen No results found for this or any previous visit from the past 365 days.     Carotid DopplerNo results found for this or any previous visit from the past 07980 days.      X Ray === 02/13/24 ===    XR KNEE 3 VIEWS LEFT    - Impression -  Degenerative changes.    Trace left suprapatellar effusion.      MACRO:  None    Signed by: Jessica Agrawal 2/14/2024 5:59 PM  Dictation workstation:   [id number]  Pulmonary Function Tests   No results found for this or any previous visit from the past 365 days.    No results found for: \"FEV1\", \"FVC\", \"QPM7DED\", \"TLC\", \"DLCO\"   OTHER: No results found for this or any previous visit from the past 1825 days.    The ASCVD Risk score (Jayshree TOPETE, et al., 2019) failed to calculate for the following reasons:    The 2019 ASCVD risk score is only valid for ages 40 to 79    Code Status: Full Code                   Clinical information reviewed:   Tobacco  Allergies  Meds   Med Hx  Surg Hx   Fam Hx  Soc Hx        NPO Detail:  No data recorded     Physical Exam    Airway  Mallampati: II     Cardiovascular    Dental      "   Pulmonary    Abdominal            Anesthesia Plan    History of general anesthesia?: yes  History of complications of general anesthesia?: no    ASA 3     general     intravenous induction   Anesthetic plan and risks discussed with patient.               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   • Rectal bleeding 05/19/2023   [2]  Past Surgical History:  Procedure Laterality Date   • BLADDER SUSPENSION     • CATARACT EXTRACTION W/  INTRAOCULAR LENS IMPLANT     • SINUS SURGERY     • TONSILLECTOMY     • TOTAL ABDOMINAL HYSTERECTOMY W/ BILATERAL SALPINGOOPHORECTOMY     [3]  Social History  Tobacco Use   • Smoking status: Never   • Smokeless tobacco: Never   Vaping Use   • Vaping status: Never Used   Substance Use Topics   • Alcohol use: Not Currently     Comment: occasional alcohol   • Drug use: Never   [4]  Allergies  Allergen Reactions   • Aspirin Other     upset stomach   • Ibuprofen Unknown     motrin   • Penicillins Hives, Rash and Unknown     tolerated ceftriaxone 2022     [5]    Current Facility-Administered Medications:   •  acetaminophen (Tylenol) tablet 650 mg, 650 mg, oral, q4h PRN **OR** acetaminophen (Tylenol) oral liquid 650 mg, 650 mg, oral, q4h PRN **OR** acetaminophen (Tylenol) suppository 650 mg, 650 mg, rectal, q4h PRN, Chana Whelan MD  •  ciprofloxacin (Cipro) 400 mg in dextrose 5%  mL, 400 mg, intravenous, q12h, Chana Whelan MD, Stopped at 08/07/25 2158  •  enoxaparin (Lovenox) syringe 40 mg, 40 mg, subcutaneous, q24h, Chana Whelan MD, 40 mg at 08/07/25 2038  •  guaiFENesin (Mucinex) 12 hr tablet 600 mg, 600 mg, oral, q12h PRN, Chana Whelan MD  •  hydrALAZINE (Apresoline) injection 10 mg, 10 mg, intravenous, q8h PRN, Chana Whelan MD, 10 mg at 08/07/25 0506  •  HYDROmorphone (Dilaudid)  injection 0.5 mg, 0.5 mg, intravenous, q5 min PRN, Douglas Cerna MD  •  HYDROmorphone PF (Dilaudid) injection 0.2 mg, 0.2 mg, intravenous, q5 min PRN, Douglas Ceran MD  •  lidocaine PF (Xylocaine) 10 mg/mL (1 %) injection 1 mg, 0.1 mL, subcutaneous, Once, Douglas Cerna MD  •  meperidine (PF) (Demerol) injection 12.5 mg, 12.5 mg, intravenous, q10 min PRN, Douglas Cerna MD  •  metoclopramide (Reglan) injection 10 mg, 10 mg, intravenous, Once PRN, Douglas Cerna MD  •  metroNIDAZOLE (Flagyl) 500 mg in sodium chloride (iso)  mL, 500 mg, intravenous, q8h, Chana Whelan MD, Stopped at 08/08/25 0500  •  morphine injection 2 mg, 2 mg, intravenous, q4h PRN, Chana Whelan MD  •  ondansetron (Zofran) tablet 4 mg, 4 mg, oral, q8h PRN **OR** ondansetron (Zofran) injection 4 mg, 4 mg, intravenous, q8h PRN, Chana Whelan MD, 4 mg at 08/06/25 1755  •  ondansetron (Zofran) injection 4 mg, 4 mg, intravenous, Once PRN, Douglas Cerna MD  •  oxyCODONE (Roxicodone) immediate release tablet 5 mg, 5 mg, oral, q4h PRN, Douglas Cerna MD  •  oxygen (O2) therapy, , inhalation, Continuous - O2/gases, Douglas Cerna MD  •  pantoprazole (ProtoNix) EC tablet 40 mg, 40 mg, oral, Daily before breakfast, 40 mg at 08/06/25 0602 **OR** pantoprazole (Protonix) injection 40 mg, 40 mg, intravenous, Daily before breakfast, Chana Whelan MD, 40 mg at 08/08/25 0456  •  polyethylene glycol (Glycolax, Miralax) packet 17 g, 17 g, oral, Daily PRN, Chana Whelan MD

## 2025-08-08 NOTE — OP NOTE
CHOLECYSTECTOMY, LAPAROSCOPIC Operative Note     Date: 2025  OR Location: [facility] OR    Name: Jessy Masterson, : 1935, Age: 89 y.o., MRN: [id number], Sex: female    Diagnosis  Pre-op Diagnosis      * Choledocholithiasis [K80.50] Post-op Diagnosis     * Choledocholithiasis [K80.50]     Procedures  CHOLECYSTECTOMY, LAPAROSCOPIC  30947 - [place] LAPAROSCOPY SURG CHOLECYSTECTOMY      Surgeons      * Vivek Kirkland - Primary    Resident/Fellow/Other Assistant:  Surgeons and Role:     * Mackenzie A Simerlink, MD - Resident - Assisting    Staff:   Circulator: Krystle Akins Person: Kathleen  Circulator: Bel    Anesthesia Staff: Anesthesiologist: Douglas Cerna MD  CRNA: BRIDGETTE Varma-EVELYN    Procedure Summary  Anesthesia: General  ASA: III  Estimated Blood Loss: 25mL  Intra-op Medications:   Administrations occurring from 0955 to 1115 on 25:   Medication Name Total Dose   BUPivacaine-EPINEPHrine (Marcaine w/EPI) 0.5 %-1:200,000 injection 13 mL   sodium chloride 0.9 % irrigation solution 1,000 mL   ciprofloxacin (Cipro) IVPB 400 mg 200 /mL D5W (premix) 400 mg   dexAMETHasone (Decadron) 4 mg/mL IV Syringe 2 mL 4 mg   fentaNYL (Sublimaze) injection 50 mcg/mL 100 mcg   LR bolus Cannot be calculated   lidocaine (Xylocaine) injection 2 % 100 mg   ondansetron (Zofran) 2 mg/mL injection 4 mg   phenylephrine 100 mcg/mL syringe 10 mL (prefilled) 100 mcg   propofol (Diprivan) injection 10 mg/mL 120 mg   rocuronium (ZeMuron) 50 mg/5 mL injection 40 mg              Anesthesia Record               Intraprocedure I/O Totals          Intake    LR bolus 250.00 mL    Total Intake 250 mL       Output    Urine 0 mL    Est. Blood Loss 5 mL    Total Output 5 mL       Net    Net Volume 245 mL          Specimen:   ID Type Source Tests Collected by Time   1 : GALLBLADDER WITH GALL STONES Tissue GALLBLADDER CHOLECYSTECTOMY SURGICAL PATHOLOGY EXAM Vivek Kirkland MD 2025 1112                 Drains and/or  Catheters: * None in log *    Tourniquet Times:         Implants:     Findings: Adhesions to gallbladder. Dilated duct on ultrasound cholangiogram.  No Common bile duct stones    Indications: Jessy Masterson is an 89 y.o. female who is having surgery for Choledocholithiasis [K80.50].  Patient had ERCP for common bile duct stones.  Still has residual gallbladder that is distended on CT scan with other stones.  Plan is for laparoscopic cholecystectomy.  Use the  discussed with the patient.    The patient was seen in the preoperative area. The risks, benefits, complications, treatment options, non-operative alternatives, expected recovery and outcomes were discussed with the patient. The possibilities of reaction to medication, pulmonary aspiration, injury to surrounding structures, bleeding, recurrent infection, the need for additional procedures, failure to diagnose a condition, and creating a complication requiring transfusion or operation were discussed with the patient. The patient concurred with the proposed plan, giving informed consent.  The site of surgery was properly noted/marked if necessary per policy. The patient has been actively warmed in preoperative area. Preoperative antibiotics have been ordered and given within 1 hours of incision. Venous thrombosis prophylaxis have been ordered including unilateral sequential compression device    Procedure Details: Patient brought to the op room general endotracheal anesthesia performed.  Patient abdomen is prepped and draped.  Using a vertical umbilical incision and the Saxena technique the abdomen was entered insufflated.  I then placed 2 trocars subcostally.  Lifted up the gallbladder.  Took down adhesions.  Identified the cystic duct and cystic artery.  Clipped and divided these.  I did a critical view and a pause.  I then removed the gallbladder bed.  There is some slight liver bed bleeding that we cauterized there is no residual  bleeding once I remove the gallbladder.  Use a 7.5 MHz ultrasound probe.  Identified the common hepatic duct common bile duct there is no stones but was dilated over 1.2 cm.  Gallbladder is placed in Endobag and brought out through my umbilical trocar site.  Closed the fascial defect at the umbilicus 0 Vicryl and skin incisions with 4-0 Vicryl  Evidence of Infection: No   Complications:  None; patient tolerated the procedure well.    Disposition: PACU - hemodynamically stable.  Condition: stable               Attending Attestation: I was present and scrubbed for the entire procedure.    Vivek Kirkland  Phone Number: [phone]

## 2025-08-08 NOTE — ANESTHESIA POSTPROCEDURE EVALUATION
Patient: Jessy Masterson    Procedure Summary       Date: 08/08/25 Room / Location: [facility] OR 18 / [facility] OR    Anesthesia Start: 1032 Anesthesia Stop: 1137    Procedure: CHOLECYSTECTOMY, LAPAROSCOPIC Diagnosis:       Choledocholithiasis      (Choledocholithiasis [K80.50])    Surgeons: Vivek Kirkland MD Responsible Provider: Douglas Cerna MD    Anesthesia Type: general ASA Status: 3            Anesthesia Type: general    Vitals Value Taken Time   /67 08/08/25 11:46   Temp 36.2 °C (97.2 °F) 08/08/25 11:30   Pulse 77 08/08/25 11:49   Resp 16 08/08/25 11:45   SpO2 97 % 08/08/25 11:49   Vitals shown include unfiled device data.    Anesthesia Post Evaluation    Patient location during evaluation: bedside  Patient participation: complete - patient cannot participate  Level of consciousness: awake  Pain score: 0  Pain management: adequate  Airway patency: patent  Cardiovascular status: acceptable and hemodynamically stable  Respiratory status: acceptable and nonlabored ventilation  Hydration status: acceptable  Postoperative Nausea and Vomiting: none        No notable events documented.

## 2025-08-08 NOTE — PROGRESS NOTES
INFECTIOUS DISEASE DAILY PROGRESS NOTE    SUBJECTIVE:    No overnight events. No new complaints. Afebrile. No rash/itching/diarrhea.    ASSESSMENT/PLAN:     Choledocholithiasis and Ascending Cholangitis - s/p ERCP 8/6 with stones cleared  Penicillin Allergy - limits abx options     Lap dominic today.     When ready for home can be on PO Cipro 500mg BID and PO Flagyl 500mg TID for 1 week from cholecystectomy.    Monitoring for adverse effects of abx such as rash/itching/diarrhea - none.    Will sign off. Please call back with questions. Thanks!     Dionicio Gonzales MD  [facility]  Office #[phone]     PHYSICAL EXAM:  Gen - NAD  Abd - soft, no ttp  Skin - no rash     ABX: IV Cipro/Flagyl    VITALS (Last 24 Hours)  /80   Pulse 83   Temp 36.7 °C (98.1 °F) (Temporal)   Resp 18   Ht (!) 1.524 m (5')   Wt 74.8 kg (164 lb 14.5 oz)   SpO2 96%   BMI 32.21 kg/m²     LABS:  Lab Results   Component Value Date    WBC 7.4 08/08/2025    HGB 11.7 (L) 08/08/2025    HCT 35.6 (L) 08/08/2025    MCV 97 08/08/2025     08/08/2025     Lab Results   Component Value Date    GLUCOSE 100 (H) 08/08/2025    CALCIUM 8.2 (L) 08/08/2025     08/08/2025    K 3.4 (L) 08/08/2025    CO2 27 08/08/2025     08/08/2025    BUN 23 08/08/2025    CREATININE 1.03 08/08/2025     Results from last 72 hours   Lab Units 08/08/25  0537   ALK PHOS U/L 286*   BILIRUBIN TOTAL mg/dL 1.6*   PROTEIN TOTAL g/dL 5.6*   ALT U/L 101*   AST U/L 65*   ALBUMIN g/dL 3.0*     Estimated Creatinine Clearance: 34.1 mL/min (by C-G formula based on SCr of 1.03 mg/dL).

## 2025-08-09 LAB
ALBUMIN SERPL BCP-MCNC: 3.2 G/DL (ref 3.4–5)
ALP SERPL-CCNC: 271 U/L (ref 33–136)
ALT SERPL W P-5'-P-CCNC: 108 U/L (ref 7–45)
ANION GAP SERPL CALC-SCNC: 14 MMOL/L (ref 10–20)
AST SERPL W P-5'-P-CCNC: 102 U/L (ref 9–39)
BILIRUB SERPL-MCNC: 1.4 MG/DL (ref 0–1.2)
BUN SERPL-MCNC: 24 MG/DL (ref 6–23)
CALCIUM SERPL-MCNC: 8.3 MG/DL (ref 8.6–10.3)
CHLORIDE SERPL-SCNC: 103 MMOL/L (ref 98–107)
CO2 SERPL-SCNC: 23 MMOL/L (ref 21–32)
CREAT SERPL-MCNC: 0.96 MG/DL (ref 0.5–1.05)
EGFRCR SERPLBLD CKD-EPI 2021: 57 ML/MIN/1.73M*2
ERYTHROCYTE [DISTWIDTH] IN BLOOD BY AUTOMATED COUNT: 14.6 % (ref 11.5–14.5)
GLUCOSE BLD MANUAL STRIP-MCNC: 107 MG/DL (ref 74–99)
GLUCOSE BLD MANUAL STRIP-MCNC: 120 MG/DL (ref 74–99)
GLUCOSE BLD MANUAL STRIP-MCNC: 170 MG/DL (ref 74–99)
GLUCOSE BLD MANUAL STRIP-MCNC: 184 MG/DL (ref 74–99)
GLUCOSE SERPL-MCNC: 104 MG/DL (ref 74–99)
HCT VFR BLD AUTO: 38.8 % (ref 36–46)
HGB BLD-MCNC: 12.4 G/DL (ref 12–16)
MCH RBC QN AUTO: 31.6 PG (ref 26–34)
MCHC RBC AUTO-ENTMCNC: 32 G/DL (ref 32–36)
MCV RBC AUTO: 99 FL (ref 80–100)
NRBC BLD-RTO: 0 /100 WBCS (ref 0–0)
PLATELET # BLD AUTO: 328 X10*3/UL (ref 150–450)
POTASSIUM SERPL-SCNC: 3.9 MMOL/L (ref 3.5–5.3)
PROT SERPL-MCNC: 6 G/DL (ref 6.4–8.2)
RBC # BLD AUTO: 3.93 X10*6/UL (ref 4–5.2)
SODIUM SERPL-SCNC: 136 MMOL/L (ref 136–145)
WBC # BLD AUTO: 11.8 X10*3/UL (ref 4.4–11.3)

## 2025-08-09 PROCEDURE — 1100000001 HC PRIVATE ROOM DAILY

## 2025-08-09 PROCEDURE — 2500000004 HC RX 250 GENERAL PHARMACY W/ HCPCS (ALT 636 FOR OP/ED): Performed by: NURSE PRACTITIONER

## 2025-08-09 PROCEDURE — 85027 COMPLETE CBC AUTOMATED: CPT | Performed by: NURSE PRACTITIONER

## 2025-08-09 PROCEDURE — 97110 THERAPEUTIC EXERCISES: CPT | Mod: GP,CQ

## 2025-08-09 PROCEDURE — 2500000001 HC RX 250 WO HCPCS SELF ADMINISTERED DRUGS (ALT 637 FOR MEDICARE OP): Performed by: INTERNAL MEDICINE

## 2025-08-09 PROCEDURE — 97116 GAIT TRAINING THERAPY: CPT | Mod: GP,CQ

## 2025-08-09 PROCEDURE — 2500000001 HC RX 250 WO HCPCS SELF ADMINISTERED DRUGS (ALT 637 FOR MEDICARE OP): Performed by: NURSE PRACTITIONER

## 2025-08-09 PROCEDURE — 2500000004 HC RX 250 GENERAL PHARMACY W/ HCPCS (ALT 636 FOR OP/ED): Performed by: INTERNAL MEDICINE

## 2025-08-09 PROCEDURE — 2500000005 HC RX 250 GENERAL PHARMACY W/O HCPCS: Performed by: STUDENT IN AN ORGANIZED HEALTH CARE EDUCATION/TRAINING PROGRAM

## 2025-08-09 PROCEDURE — 82947 ASSAY GLUCOSE BLOOD QUANT: CPT

## 2025-08-09 PROCEDURE — 36415 COLL VENOUS BLD VENIPUNCTURE: CPT | Performed by: NURSE PRACTITIONER

## 2025-08-09 PROCEDURE — 80053 COMPREHEN METABOLIC PANEL: CPT | Performed by: NURSE PRACTITIONER

## 2025-08-09 RX ORDER — POLYETHYLENE GLYCOL 3350 17 G/17G
17 POWDER, FOR SOLUTION ORAL DAILY PRN
Qty: 5 PACKET | Refills: 0 | Status: SHIPPED | OUTPATIENT
Start: 2025-08-09

## 2025-08-09 RX ORDER — TRAMADOL HYDROCHLORIDE 50 MG/1
50 TABLET, FILM COATED ORAL EVERY 8 HOURS PRN
Qty: 12 TABLET | Refills: 0 | Status: SHIPPED | OUTPATIENT
Start: 2025-08-09

## 2025-08-09 RX ORDER — ONDANSETRON 4 MG/1
4 TABLET, FILM COATED ORAL EVERY 8 HOURS PRN
Qty: 20 TABLET | Refills: 0 | Status: SHIPPED | OUTPATIENT
Start: 2025-08-09

## 2025-08-09 RX ORDER — ACETAMINOPHEN 325 MG/1
650 TABLET ORAL EVERY 6 HOURS PRN
Qty: 30 TABLET | Refills: 0 | Status: SHIPPED | OUTPATIENT
Start: 2025-08-09

## 2025-08-09 RX ADMIN — TRAMADOL HYDROCHLORIDE 50 MG: 50 TABLET, COATED ORAL at 22:02

## 2025-08-09 RX ADMIN — CIPROFLOXACIN 400 MG: 2 INJECTION, SOLUTION INTRAVENOUS at 08:53

## 2025-08-09 RX ADMIN — METRONIDAZOLE 500 MG: 500 INJECTION, SOLUTION INTRAVENOUS at 21:49

## 2025-08-09 RX ADMIN — TRAMADOL HYDROCHLORIDE 50 MG: 50 TABLET, COATED ORAL at 13:04

## 2025-08-09 RX ADMIN — METRONIDAZOLE 500 MG: 500 INJECTION, SOLUTION INTRAVENOUS at 13:04

## 2025-08-09 RX ADMIN — ENOXAPARIN SODIUM 40 MG: 100 INJECTION SUBCUTANEOUS at 20:24

## 2025-08-09 RX ADMIN — MORPHINE SULFATE 2 MG: 2 INJECTION, SOLUTION INTRAMUSCULAR; INTRAVENOUS at 07:31

## 2025-08-09 RX ADMIN — LIDOCAINE 4% 1 PATCH: 40 PATCH TOPICAL at 08:52

## 2025-08-09 RX ADMIN — PANTOPRAZOLE SODIUM 40 MG: 40 TABLET, DELAYED RELEASE ORAL at 06:13

## 2025-08-09 RX ADMIN — CIPROFLOXACIN 400 MG: 2 INJECTION, SOLUTION INTRAVENOUS at 20:20

## 2025-08-09 RX ADMIN — METRONIDAZOLE 500 MG: 500 INJECTION, SOLUTION INTRAVENOUS at 06:14

## 2025-08-09 ASSESSMENT — COGNITIVE AND FUNCTIONAL STATUS - GENERAL
STANDING UP FROM CHAIR USING ARMS: A LITTLE
PERSONAL GROOMING: A LITTLE
MOBILITY SCORE: 18
WALKING IN HOSPITAL ROOM: A LITTLE
DAILY ACTIVITIY SCORE: 19
MOVING FROM LYING ON BACK TO SITTING ON SIDE OF FLAT BED WITH BEDRAILS: A LITTLE
DRESSING REGULAR UPPER BODY CLOTHING: A LITTLE
WALKING IN HOSPITAL ROOM: A LITTLE
CLIMB 3 TO 5 STEPS WITH RAILING: A LITTLE
DRESSING REGULAR LOWER BODY CLOTHING: A LITTLE
MOVING TO AND FROM BED TO CHAIR: A LITTLE
TURNING FROM BACK TO SIDE WHILE IN FLAT BAD: A LITTLE
MOVING FROM LYING ON BACK TO SITTING ON SIDE OF FLAT BED WITH BEDRAILS: A LITTLE
HELP NEEDED FOR BATHING: A LITTLE
MOBILITY SCORE: 18
TURNING FROM BACK TO SIDE WHILE IN FLAT BAD: A LITTLE
STANDING UP FROM CHAIR USING ARMS: A LITTLE
TOILETING: A LITTLE
CLIMB 3 TO 5 STEPS WITH RAILING: A LITTLE
MOVING TO AND FROM BED TO CHAIR: A LITTLE

## 2025-08-09 ASSESSMENT — PAIN SCALES - GENERAL
PAINLEVEL_OUTOF10: 7
PAINLEVEL_OUTOF10: 5 - MODERATE PAIN
PAINLEVEL_OUTOF10: 5 - MODERATE PAIN
PAINLEVEL_OUTOF10: 8
PAINLEVEL_OUTOF10: 8

## 2025-08-09 ASSESSMENT — PAIN - FUNCTIONAL ASSESSMENT
PAIN_FUNCTIONAL_ASSESSMENT: UNABLE TO SELF-REPORT
PAIN_FUNCTIONAL_ASSESSMENT: 0-10

## 2025-08-09 ASSESSMENT — PAIN DESCRIPTION - LOCATION: LOCATION: ABDOMEN

## 2025-08-09 ASSESSMENT — PAIN DESCRIPTION - DESCRIPTORS: DESCRIPTORS: ACHING

## 2025-08-09 NOTE — PROGRESS NOTES
Physical Therapy    Physical Therapy Treatment    Patient Name: Jessy Masterson  MRN: [id number]  Department: [place] [address]  Room: [address] [place] [address]  Today's Date: 8/9/2025  Time Calculation  Start Time: 1015  Stop Time: 1045  Time Calculation (min): 30 min         Assessment/Plan   PT Assessment  PT Assessment Results: Decreased strength, Decreased range of motion, Decreased endurance, Impaired balance  Rehab Prognosis: Excellent  End of Session Communication: Bedside nurse  Assessment Comment: Patient is alert and cooperates well in session.C/o dizziness with standing which passes with instructions provided to improve breathng pattern. Visistor is present and active during session. Review of home exercises, safety precauitons and stair manipulation technique.  End of Session Patient Position: Up in chair, Alarm on  PT Plan  Inpatient/Swing Bed or Outpatient: Inpatient  PT Plan  Treatment/Interventions: Bed mobility, Transfer training, Gait training, Stair training, Balance training, Strengthening, Endurance training, Range of motion, Therapeutic exercise, Therapeutic activity, Home exercise program  PT Plan: Ongoing PT  PT Frequency: 3 times per week  PT Discharge Recommendations: Low intensity level of continued care  Equipment Recommended upon Discharge: Wheeled walker  PT Recommended Transfer Status: Assist x1  PT - OK to Discharge: Yes (Per POC)    PT Visit Info:  PT Received On: 08/09/25  Response to Previous Treatment: Patient with no complaints from previous session.     General Visit Information:   General  Reason for Referral: Pt is an 89 y/o female who presented with c/o epigastric pain and syncope. Pt admitted to [facility] [place], [place] for treatment of Acute choledocholithiasis, Transaminitis, Acute kidney injury with anticipated lap dominic tomorrow (8/7).  Referred By: BRIDGETTE Corado-CNP  Family/Caregiver Present: Yes  Caregiver Feedback: sister present & assists with translating d/t language barrier  General Comment:  Patient is supine in bed and agreeable to physical therapy    Subjective   Precautions:        Date/Time Vitals Session Patient Position Pulse Resp SpO2 BP MAP (mmHg)    08/09/25 1232 --  --  91  17  96 %  157/80  106            Objective   Pain:  Pain Assessment  Pain Assessment: 0-10  0-10 (Numeric) Pain Score: 8  Pain Type: Surgical pain  Pain Location: Abdomen  Cognition:  Cognition  Overall Cognitive Status: Within Functional Limits  Orientation Level: Oriented X4  Coordination:  Movements are Fluid and Coordinated: Yes  Postural Control:  Postural Control  Postural Control: Within Functional Limits  Static Sitting Balance  Static Sitting-Balance Support: Bilateral upper extremity supported  Static Sitting-Level of Assistance: Close supervision  Static Sitting-Comment/Number of Minutes: sitting edge of bed for 2-minutes with review of precautions with her and visitor.  Dynamic Sitting Balance  Dynamic Sitting-Balance Support: Feet supported, Bilateral upper extremity supported  Dynamic Sitting-Level of Assistance: Close supervision  Dynamic Sitting-Balance: Reaching for objects  Dynamic Sitting-Comments: Seated therex x 5-minutes  Static Standing Balance  Static Standing-Balance Support: Bilateral upper extremity supported  Static Standing-Level of Assistance: Close supervision  Static Standing-Comment/Number of Minutes: FWW used with not loss of balance noted.  Dynamic Standing Balance  Dynamic Standing-Balance Support: Bilateral upper extremity supported  Dynamic Standing-Level of Assistance: Contact guard  Dynamic Standing-Balance: Reaching for objects, Turning  Dynamic Standing-Comments: No loss of balance noted during activities.  Extremity/Trunk Assessments:    Activity Tolerance:  Activity Tolerance  Endurance: Tolerates 30 min exercise with multiple rests  Treatments:  Therapeutic Exercise  Therapeutic Exercise Performed: Yes  Therapeutic Exercise Activity 1: Patient is instructed in and performs seated  bilateral lower extremity ankle pumps, knee extension, marches, and hip abduction x 12reps  1. Working [facility] patient to improve strength and active range of motion.    Therapeutic Activity  Therapeutic Activity Performed: No    Bed Mobility  Bed Mobility: Yes  Bed Mobility 1  Bed Mobility 1: Sitting to supine  Level of Assistance 1: Minimum assistance, Minimal verbal cues  Bed Mobility Comments 1: Verbal cues provided for safe log roll with manual assistance needed to sit edge of bed.    Ambulation/Gait Training  Ambulation/Gait Training Performed: Yes  Ambulation/Gait Training 1  Surface 1: Level tile  Device 1: Rolling walker  Gait Support Devices: Gait belt  Assistance 1: Contact guard  Quality of Gait 1: Decreased step length  Comments/Distance (ft) 1: Patient ambulates 25 feet x 2 with FWW and CGA. Training provided for improved weight shifting to allow for increased knee and hip flexion/surface clearance.  Transfers  Transfer: Yes  Transfer 1  Transfer From 1: Sit to  Transfer to 1: Stand  Technique 1: Sit to stand, Stand to sit  Transfer Device 1: Gait belt, Walker  Transfer Level of Assistance 1: Close supervision  Trials/Comments 1: Patient is able to stand from bed with cues for improved safety awarness and hand and feet placement.    Stairs  Stairs: No    Outcome Measures:  [facility] Basic Mobility  Turning from your back to your side while in a flat bed without using bedrails: A little  Moving from lying on your back to sitting on the side of a flat bed without using bedrails: A little  Moving to and from bed to chair (including a wheelchair): A little  Standing up from a chair using your arms (e.g. wheelchair or bedside chair): A little  To walk in hospital room: A little  Climbing 3-5 steps with railing: A little  Basic Mobility - Total Score: 18    Education Documentation  Handouts, taught by Edelmira Parr PTA at 8/9/2025 12:33 PM.  Learner: Patient  Readiness: Acceptance  Method: Explanation, Demonstration,  Teach-back  Response: Verbalizes Understanding, Demonstrated Understanding    Precautions, taught by Edelmira Parr PTA at 8/9/2025 12:33 PM.  Learner: Patient  Readiness: Acceptance  Method: Explanation, Demonstration, Teach-back  Response: Verbalizes Understanding, Demonstrated Understanding    Body Mechanics, taught by Edelmira Parr PTA at 8/9/2025 12:33 PM.  Learner: Patient  Readiness: Acceptance  Method: Explanation, Demonstration, Teach-back  Response: Verbalizes Understanding, Demonstrated Understanding    Home Exercise Program, taught by Edelmira Parr PTA at 8/9/2025 12:33 PM.  Learner: Patient  Readiness: Acceptance  Method: Explanation, Demonstration, Teach-back  Response: Verbalizes Understanding, Demonstrated Understanding    Mobility Training, taught by Edelmira Parr PTA at 8/9/2025 12:33 PM.  Learner: Patient  Readiness: Acceptance  Method: Explanation, Demonstration, Teach-back  Response: Verbalizes Understanding, Demonstrated Understanding    Education Comments  No comments found.        OP EDUCATION:  Outpatient Education  Individual(s) Educated: Patient, Caregiver  Education Provided: Fall Risk, Post-Op Precautions, Home Exercise Program    Encounter Problems       Encounter Problems (Active)       Balance       LTG - Patient will demonstrate Intervention to enhance balance for safe completion of daily activities (Progressing)       Start:  08/07/25    Expected End:  08/21/25               Mobility       LTG - Patient will navigate 2 steps with HR used and min A. (Progressing)       Start:  08/07/25    Expected End:  08/21/25            STG - Patient will ambulate mod I with LRAD and >75' (Progressing)       Start:  08/07/25    Expected End:  08/21/25               PT Transfers       STG - Transfer from bed to chair with mod I and LRAD (Progressing)       Start:  08/07/25    Expected End:  08/21/25            STG - Patient will perform bed mobility independently. (Progressing)       Start:  08/07/25     Expected End:  08/21/25

## 2025-08-09 NOTE — PROGRESS NOTES
Jessy Masterson is a 89 y.o. female on day 5 of admission presenting with Choledocholithiasis.    Subjective   Patient seen this afternoon.  Tolerating diet.  Pain well controlled.  No further complaints.    Objective   PE:  Constitutional: A&Ox3, calm and cooperative, NAD  Cardiovascular: Normal rate and regular rhythm.  Respiratory/Thorax: Good symmetric chest expansion. No labored breathing.  Gastrointestinal: Abdomen slightly distended, soft, appropriately tender, no peritoneal signs, laparoscopy sites c/d/i, well approximate with Dermabond, no erythema or drainage  Genitourinary: Voiding independently   Neurological: A&Ox3, No focal deficits  Psychological: Appropriate mood and behavior  Skin: Warm and dry    Last Recorded Vitals  Blood pressure 155/73, pulse 83, temperature 36.1 °C (97 °F), temperature source Temporal, resp. rate 17, height (!) 1.524 m (5'), weight 74.8 kg (164 lb 14.5 oz), SpO2 95%.  Intake/Output last 3 Shifts:  I/O last 3 completed shifts:  In: 1317 (17.6 mL/kg) [I.V.:67 (0.9 mL/kg); IV Piggyback:1250]  Out: 455 (6.1 mL/kg) [Urine:450 (0.2 mL/kg/hr); Blood:5]  Weight: 74.8 kg     Relevant Results  Scheduled medications  Scheduled Medications[1]  Continuous medications  Continuous Medications[2]  PRN medications  PRN Medications[3]    Results for orders placed or performed during the hospital encounter of 08/04/25 (from the past 24 hours)   POCT GLUCOSE   Result Value Ref Range    POCT Glucose 163 (H) 74 - 99 mg/dL   Comprehensive metabolic panel   Result Value Ref Range    Glucose 104 (H) 74 - 99 mg/dL    Sodium 136 136 - 145 mmol/L    Potassium 3.9 3.5 - 5.3 mmol/L    Chloride 103 98 - 107 mmol/L    Bicarbonate 23 21 - 32 mmol/L    Anion Gap 14 10 - 20 mmol/L    Urea Nitrogen 24 (H) 6 - 23 mg/dL    Creatinine 0.96 0.50 - 1.05 mg/dL    eGFR 57 (L) >60 mL/min/1.73m*2    Calcium 8.3 (L) 8.6 - 10.3 mg/dL    Albumin 3.2 (L) 3.4 - 5.0 g/dL    Alkaline Phosphatase 271 (H) 33 - 136 U/L     Total Protein 6.0 (L) 6.4 - 8.2 g/dL     (H) 9 - 39 U/L    Bilirubin, Total 1.4 (H) 0.0 - 1.2 mg/dL     (H) 7 - 45 U/L   CBC   Result Value Ref Range    WBC 11.8 (H) 4.4 - 11.3 x10*3/uL    nRBC 0.0 0.0 - 0.0 /100 WBCs    RBC 3.93 (L) 4.00 - 5.20 x10*6/uL    Hemoglobin 12.4 12.0 - 16.0 g/dL    Hematocrit 38.8 36.0 - 46.0 %    MCV 99 80 - 100 fL    MCH 31.6 26.0 - 34.0 pg    MCHC 32.0 32.0 - 36.0 g/dL    RDW 14.6 (H) 11.5 - 14.5 %    Platelets 328 150 - 450 x10*3/uL   POCT GLUCOSE   Result Value Ref Range    POCT Glucose 107 (H) 74 - 99 mg/dL   POCT GLUCOSE   Result Value Ref Range    POCT Glucose 120 (H) 74 - 99 mg/dL     Assessment & Plan    POD1 laparoscopic cholecystectomy with Dr. Kirkland on 8/8/2025.  Labs and imaging reviewed.    Plan:  - Regular diet  - Encourage OOB/ambulation  - Encourage IS  - PRN antiemetic  - DVT proph: SCDs/lovenox  - Supportive care per primary  - Follow-up with Dr Kikrland in 2 weeks upon discharge, referral placed    Surgery to sign off, we are available as needed if there are any new questions or concerns.    Discussed and saw patient with Dr. Siegel.    I spent 35 minutes in the professional and overall care of this patient.    Jose Miguel Rossi PA-C         [1] ciprofloxacin, 400 mg, intravenous, q12h  enoxaparin, 40 mg, subcutaneous, q24h  insulin lispro, 0-5 Units, subcutaneous, Before meals & nightly  lidocaine, 1 patch, transdermal, Daily  metroNIDAZOLE, 500 mg, intravenous, q8h  pantoprazole, 40 mg, oral, Daily before breakfast   Or  pantoprazole, 40 mg, intravenous, Daily before breakfast  polyethylene glycol, 17 g, oral, Daily  [2]    [3] PRN medications: acetaminophen **OR** acetaminophen **OR** acetaminophen, guaiFENesin, hydrALAZINE, morphine, ondansetron **OR** ondansetron, traMADol

## 2025-08-09 NOTE — POST-PROCEDURE NOTE
Significant pain, getting dilaudid at time of assessment. Did not eat much, was nauseous after OR. Voiding via purewick tea colored     PE:  Constitutional: A&Ox3, calm and cooperative, NAD  Eyes: EOMI, clear sclera   Cardiovascular: Normal rate and regular rhythm  Respiratory/Thorax: CTAB, on RA  Gastrointestinal: abd soft, appropriately tender, lap sites CDI with steristrips, +BS x 4  Genitourinary: Voiding independently via ext cath- tea colored   Musculoskeletal: ROM intact  Extremities: No peripheral edema  Neurological: A&Ox3, No focal deficits   Psychological: Appropriate mood and behavior      Radiology and labs reviewed      POD0 lap dominic  Intra-op findings: Adhesions to gallbladder. Dilated duct on ultrasound cholangiogram. No Common bile duct stones     Plan:   - Diet: Low fat   - Continue current pain regimen, tramadol severe, add lidocaine patch   - PRN antiemetic   - DVT Proph: SCDs/ ambulate/ Heparin SQ   - Bowel regimen: miralax daily   - Monitor VS every 4 hours   - Labs ordered for AM   - IS every hour while awake   - Encourage ambulation / OOB as tolerated   - Monitor lap sites for drainage, erythema, excessive bruising   - Continue supportive care    Dispo: ADOD tomorrow pending pain control, diet toleration     Total time spent 35 minutes, and greater than 50% of  time was spent in counseling/coordination of care

## 2025-08-09 NOTE — DISCHARGE INSTRUCTIONS
Instructions After Laparoscopic Surgery:    Wound Care:  -Ice packs to wounds every hour the first day  -Ok to shower in 24 hours  -no baths or swimming for 2 weeks  -keep wound clean and dry  -do not apply topical creams or ointments  -call if you notice redness around wound, foul-smelling drainage, or increasing pain     Diet:  - GI soft low residual as discussed with Dietary  - Impact Advance Shakes 3 times a day for 5 days    Activity  -Take it easy for the first 48 hours  -stairs and walks are fine  -resume activities gradually over the first week  -ask Dr Kirkland before resuming strenuous physical exertions  -No driving while on pain medication    Medications  - Tylenol as needed for mild-moderate pain.  - Please take oxycodone as needed for severe pain only. Pain medication slows down bowel function so avoid taking unless neccessary.  - Resume your home medications unless otherwise directed    Other Instructions:  -It is important to drink adequate fluid and avoid dehydration. Signs of dehydration include dark urine, decreased frequency in urine, pale mucous membrane, or dry mucous membranes. You should drink at least 8 8oz glasses of fluids a day and it should be a variety of fluids (water, juice, tea, coffee, etc.).  If you start to experience nausea, emesis, fever, or abdominal pain please call Dr. Kirkland' office.  -Call the doctor for the following:   severe unrelieved pain   fevers > 101F   Nausea/vomiting   wound issues   insurance/return to work forms   shortness of breath   chest pains     No

## 2025-08-09 NOTE — CARE PLAN
The patient's goals for the shift include      The clinical goals for the shift include Pt will be safe throughout shift

## 2025-08-09 NOTE — PROGRESS NOTES
08/09/25 1518   Discharge Planning   Expected Discharge Disposition SNF  (TBD)   Does the patient need discharge transport arranged? Yes   [facility] coordination needed? Yes   Has discharge transport been arranged? No       BONI met with patient and daughter, Bri, at bedside regarding discharge planning. Daughter informed BONI that she needs her mom to go to a facility to build her strength back before she can return home as she is unable to care for patient in the state that she is in right now. SW explained how patient did in PT/OT and that it may be hard to get patient into a SNF. Daughter is adamant about patient going to SNF short-term. BONI emailed SNF list to daughter ([email or web address]) from [facility]. Daughter also informed SW that she is going to begin the process of applying for Medicaid for patient.

## 2025-08-10 VITALS
TEMPERATURE: 97.7 F | SYSTOLIC BLOOD PRESSURE: 147 MMHG | HEIGHT: 60 IN | OXYGEN SATURATION: 99 % | BODY MASS INDEX: 32.38 KG/M2 | WEIGHT: 164.9 LBS | HEART RATE: 89 BPM | DIASTOLIC BLOOD PRESSURE: 77 MMHG | RESPIRATION RATE: 18 BRPM

## 2025-08-10 LAB
ALBUMIN SERPL BCP-MCNC: 3.1 G/DL (ref 3.4–5)
ALP SERPL-CCNC: 249 U/L (ref 33–136)
ALT SERPL W P-5'-P-CCNC: 91 U/L (ref 7–45)
ANION GAP SERPL CALC-SCNC: 14 MMOL/L (ref 10–20)
AST SERPL W P-5'-P-CCNC: 74 U/L (ref 9–39)
BILIRUB SERPL-MCNC: 1.5 MG/DL (ref 0–1.2)
BUN SERPL-MCNC: 20 MG/DL (ref 6–23)
CALCIUM SERPL-MCNC: 8.4 MG/DL (ref 8.6–10.3)
CHLORIDE SERPL-SCNC: 101 MMOL/L (ref 98–107)
CO2 SERPL-SCNC: 23 MMOL/L (ref 21–32)
CREAT SERPL-MCNC: 0.82 MG/DL (ref 0.5–1.05)
EGFRCR SERPLBLD CKD-EPI 2021: 69 ML/MIN/1.73M*2
ERYTHROCYTE [DISTWIDTH] IN BLOOD BY AUTOMATED COUNT: 14.6 % (ref 11.5–14.5)
GLUCOSE BLD MANUAL STRIP-MCNC: 163 MG/DL (ref 74–99)
GLUCOSE BLD MANUAL STRIP-MCNC: 174 MG/DL (ref 74–99)
GLUCOSE BLD MANUAL STRIP-MCNC: 183 MG/DL (ref 74–99)
GLUCOSE SERPL-MCNC: 100 MG/DL (ref 74–99)
HCT VFR BLD AUTO: 36.9 % (ref 36–46)
HGB BLD-MCNC: 12.3 G/DL (ref 12–16)
MCH RBC QN AUTO: 31.9 PG (ref 26–34)
MCHC RBC AUTO-ENTMCNC: 33.3 G/DL (ref 32–36)
MCV RBC AUTO: 96 FL (ref 80–100)
NRBC BLD-RTO: 0 /100 WBCS (ref 0–0)
PLATELET # BLD AUTO: 353 X10*3/UL (ref 150–450)
POTASSIUM SERPL-SCNC: 4 MMOL/L (ref 3.5–5.3)
PROT SERPL-MCNC: 5.3 G/DL (ref 6.4–8.2)
RBC # BLD AUTO: 3.86 X10*6/UL (ref 4–5.2)
SODIUM SERPL-SCNC: 134 MMOL/L (ref 136–145)
WBC # BLD AUTO: 10.9 X10*3/UL (ref 4.4–11.3)

## 2025-08-10 PROCEDURE — 82947 ASSAY GLUCOSE BLOOD QUANT: CPT

## 2025-08-10 PROCEDURE — 2500000001 HC RX 250 WO HCPCS SELF ADMINISTERED DRUGS (ALT 637 FOR MEDICARE OP): Performed by: FAMILY MEDICINE

## 2025-08-10 PROCEDURE — 2500000001 HC RX 250 WO HCPCS SELF ADMINISTERED DRUGS (ALT 637 FOR MEDICARE OP): Performed by: INTERNAL MEDICINE

## 2025-08-10 PROCEDURE — 1100000001 HC PRIVATE ROOM DAILY

## 2025-08-10 PROCEDURE — 97530 THERAPEUTIC ACTIVITIES: CPT | Mod: GO

## 2025-08-10 PROCEDURE — 36415 COLL VENOUS BLD VENIPUNCTURE: CPT | Performed by: NURSE PRACTITIONER

## 2025-08-10 PROCEDURE — 2500000002 HC RX 250 W HCPCS SELF ADMINISTERED DRUGS (ALT 637 FOR MEDICARE OP, ALT 636 FOR OP/ED): Performed by: INTERNAL MEDICINE

## 2025-08-10 PROCEDURE — 2500000004 HC RX 250 GENERAL PHARMACY W/ HCPCS (ALT 636 FOR OP/ED): Performed by: NURSE PRACTITIONER

## 2025-08-10 PROCEDURE — 85027 COMPLETE CBC AUTOMATED: CPT | Performed by: NURSE PRACTITIONER

## 2025-08-10 PROCEDURE — 80053 COMPREHEN METABOLIC PANEL: CPT | Performed by: NURSE PRACTITIONER

## 2025-08-10 PROCEDURE — 2500000005 HC RX 250 GENERAL PHARMACY W/O HCPCS: Performed by: STUDENT IN AN ORGANIZED HEALTH CARE EDUCATION/TRAINING PROGRAM

## 2025-08-10 RX ORDER — CIPROFLOXACIN 500 MG/1
500 TABLET, FILM COATED ORAL EVERY 12 HOURS SCHEDULED
Status: DISPENSED | OUTPATIENT
Start: 2025-08-10 | End: 2025-08-12

## 2025-08-10 RX ORDER — LATANOPROST 50 UG/ML
1 SOLUTION/ DROPS OPHTHALMIC NIGHTLY
Status: DISPENSED | OUTPATIENT
Start: 2025-08-10

## 2025-08-10 RX ORDER — LISINOPRIL 20 MG/1
40 TABLET ORAL DAILY
Status: DISPENSED | OUTPATIENT
Start: 2025-08-10

## 2025-08-10 RX ORDER — METRONIDAZOLE 500 MG/1
500 TABLET ORAL EVERY 8 HOURS SCHEDULED
Status: DISPENSED | OUTPATIENT
Start: 2025-08-10 | End: 2025-08-12

## 2025-08-10 RX ADMIN — METRONIDAZOLE 500 MG: 500 TABLET ORAL at 22:11

## 2025-08-10 RX ADMIN — LIDOCAINE 4% 1 PATCH: 40 PATCH TOPICAL at 08:33

## 2025-08-10 RX ADMIN — CIPROFLOXACIN 400 MG: 2 INJECTION, SOLUTION INTRAVENOUS at 08:34

## 2025-08-10 RX ADMIN — METRONIDAZOLE 500 MG: 500 INJECTION, SOLUTION INTRAVENOUS at 05:13

## 2025-08-10 RX ADMIN — TRAMADOL HYDROCHLORIDE 50 MG: 50 TABLET, COATED ORAL at 22:12

## 2025-08-10 RX ADMIN — LISINOPRIL 40 MG: 20 TABLET ORAL at 08:33

## 2025-08-10 RX ADMIN — ENOXAPARIN SODIUM 40 MG: 100 INJECTION SUBCUTANEOUS at 22:11

## 2025-08-10 RX ADMIN — PANTOPRAZOLE SODIUM 40 MG: 40 INJECTION, POWDER, FOR SOLUTION INTRAVENOUS at 05:14

## 2025-08-10 RX ADMIN — METRONIDAZOLE 500 MG: 500 INJECTION, SOLUTION INTRAVENOUS at 12:33

## 2025-08-10 RX ADMIN — CIPROFLOXACIN 500 MG: 500 TABLET ORAL at 22:12

## 2025-08-10 RX ADMIN — INSULIN LISPRO 1 UNITS: 100 INJECTION, SOLUTION INTRAVENOUS; SUBCUTANEOUS at 21:57

## 2025-08-10 RX ADMIN — HYDRALAZINE HYDROCHLORIDE 10 MG: 20 INJECTION INTRAMUSCULAR; INTRAVENOUS at 05:22

## 2025-08-10 ASSESSMENT — COGNITIVE AND FUNCTIONAL STATUS - GENERAL
DRESSING REGULAR LOWER BODY CLOTHING: A LITTLE
TOILETING: A LITTLE
WALKING IN HOSPITAL ROOM: A LITTLE
DRESSING REGULAR LOWER BODY CLOTHING: A LITTLE
MOBILITY SCORE: 18
DAILY ACTIVITIY SCORE: 18
MOVING FROM LYING ON BACK TO SITTING ON SIDE OF FLAT BED WITH BEDRAILS: A LITTLE
PERSONAL GROOMING: A LITTLE
DRESSING REGULAR UPPER BODY CLOTHING: A LITTLE
EATING MEALS: A LITTLE
DAILY ACTIVITIY SCORE: 21
WALKING IN HOSPITAL ROOM: A LITTLE
DRESSING REGULAR LOWER BODY CLOTHING: A LITTLE
DRESSING REGULAR UPPER BODY CLOTHING: A LITTLE
TURNING FROM BACK TO SIDE WHILE IN FLAT BAD: A LITTLE
TURNING FROM BACK TO SIDE WHILE IN FLAT BAD: A LITTLE
MOVING FROM LYING ON BACK TO SITTING ON SIDE OF FLAT BED WITH BEDRAILS: A LITTLE
STANDING UP FROM CHAIR USING ARMS: A LITTLE
CLIMB 3 TO 5 STEPS WITH RAILING: A LITTLE
DAILY ACTIVITIY SCORE: 18
MOVING TO AND FROM BED TO CHAIR: A LITTLE
TOILETING: A LITTLE
TOILETING: A LITTLE
HELP NEEDED FOR BATHING: A LITTLE
EATING MEALS: A LITTLE
CLIMB 3 TO 5 STEPS WITH RAILING: A LITTLE
MOBILITY SCORE: 18
MOVING TO AND FROM BED TO CHAIR: A LITTLE
STANDING UP FROM CHAIR USING ARMS: A LITTLE
HELP NEEDED FOR BATHING: A LITTLE
PERSONAL GROOMING: A LITTLE
HELP NEEDED FOR BATHING: A LITTLE

## 2025-08-10 ASSESSMENT — PAIN SCALES - GENERAL
PAINLEVEL_OUTOF10: 8
PAINLEVEL_OUTOF10: 5 - MODERATE PAIN
PAINLEVEL_OUTOF10: 6

## 2025-08-10 ASSESSMENT — PAIN DESCRIPTION - DESCRIPTORS: DESCRIPTORS: SORE

## 2025-08-10 ASSESSMENT — PAIN INTENSITY VAS: VAS_PAIN_BASICVITALS_IP: 8

## 2025-08-10 ASSESSMENT — PAIN - FUNCTIONAL ASSESSMENT: PAIN_FUNCTIONAL_ASSESSMENT: 0-10

## 2025-08-10 NOTE — CARE PLAN
The patient's goals for the shift include      The clinical goals for the shift include maintain pt safety    Problem: Pain - Adult  Goal: Verbalizes/displays adequate comfort level or baseline comfort level  Outcome: Progressing     Problem: Safety - Adult  Goal: Free from fall injury  Outcome: Progressing     Problem: Discharge Planning  Goal: Discharge to home or other facility with appropriate resources  Outcome: Progressing     Problem: Chronic Conditions and Co-morbidities  Goal: Patient's chronic conditions and co-morbidity symptoms are monitored and maintained or improved  Outcome: Progressing     Problem: Nutrition  Goal: Nutrient intake appropriate for maintaining nutritional needs  Outcome: Progressing     Problem: Pain  Goal: Takes deep breaths with improved pain control throughout the shift  Outcome: Progressing  Goal: Turns in bed with improved pain control throughout the shift  Outcome: Progressing  Goal: Walks with improved pain control throughout the shift  Outcome: Progressing  Goal: Performs ADL's with improved pain control throughout shift  Outcome: Progressing  Goal: Participates in PT with improved pain control throughout the shift  Outcome: Progressing  Goal: Free from opioid side effects throughout the shift  Outcome: Progressing  Goal: Free from acute confusion related to pain meds throughout the shift  Outcome: Progressing     Problem: Fall/Injury  Goal: Not fall by end of shift  Outcome: Progressing  Goal: Be free from injury by end of the shift  Outcome: Progressing  Goal: Verbalize understanding of personal risk factors for fall in the hospital  Outcome: Progressing  Goal: Verbalize understanding of risk factor reduction measures to prevent injury from fall in the home  Outcome: Progressing  Goal: Use assistive devices by end of the shift  Outcome: Progressing  Goal: Pace activities to prevent fatigue by end of the shift  Outcome: Progressing     Problem: Skin  Goal: Decreased wound  size/increased tissue granulation at next dressing change  Outcome: Progressing  Flowsheets (Taken 8/9/2025 2248)  Decreased wound size/increased tissue granulation at next dressing change: Promote sleep for wound healing  Goal: Participates in plan/prevention/treatment measures  Outcome: Progressing  Flowsheets (Taken 8/9/2025 2248)  Participates in plan/prevention/treatment measures: Elevate heels  Goal: Prevent/manage excess moisture  Outcome: Progressing  Flowsheets (Taken 8/9/2025 2248)  Prevent/manage excess moisture:   Moisturize dry skin   Cleanse incontinence/protect with barrier cream  Goal: Prevent/minimize sheer/friction injuries  Outcome: Progressing  Flowsheets (Taken 8/9/2025 2248)  Prevent/minimize sheer/friction injuries:   HOB 30 degrees or less   Turn/reposition every 2 hours/use positioning/transfer devices  Goal: Promote/optimize nutrition  Outcome: Progressing  Flowsheets (Taken 8/9/2025 2248)  Promote/optimize nutrition: Monitor/record intake including meals  Goal: Promote skin healing  Outcome: Progressing  Flowsheets (Taken 8/9/2025 2248)  Promote skin healing: Turn/reposition every 2 hours/use positioning/transfer devices

## 2025-08-10 NOTE — CARE PLAN
The patient's goals for the shift include      The clinical goals for the shift include maintain pt safety    Problem: Pain - Adult  Goal: Verbalizes/displays adequate comfort level or baseline comfort level  Outcome: Progressing     Problem: Safety - Adult  Goal: Free from fall injury  Outcome: Progressing     Problem: Discharge Planning  Goal: Discharge to home or other facility with appropriate resources  Outcome: Progressing     Problem: Chronic Conditions and Co-morbidities  Goal: Patient's chronic conditions and co-morbidity symptoms are monitored and maintained or improved  Outcome: Progressing     Problem: Nutrition  Goal: Nutrient intake appropriate for maintaining nutritional needs  Outcome: Progressing     Problem: Pain  Goal: Takes deep breaths with improved pain control throughout the shift  Outcome: Progressing  Goal: Turns in bed with improved pain control throughout the shift  Outcome: Progressing  Goal: Walks with improved pain control throughout the shift  Outcome: Progressing  Goal: Performs ADL's with improved pain control throughout shift  Outcome: Progressing  Goal: Participates in PT with improved pain control throughout the shift  Outcome: Progressing  Goal: Free from opioid side effects throughout the shift  Outcome: Progressing  Goal: Free from acute confusion related to pain meds throughout the shift  Outcome: Progressing     Problem: Fall/Injury  Goal: Not fall by end of shift  Outcome: Progressing  Goal: Be free from injury by end of the shift  Outcome: Progressing  Goal: Verbalize understanding of personal risk factors for fall in the hospital  Outcome: Progressing  Goal: Verbalize understanding of risk factor reduction measures to prevent injury from fall in the home  Outcome: Progressing  Goal: Use assistive devices by end of the shift  Outcome: Progressing  Goal: Pace activities to prevent fatigue by end of the shift  Outcome: Progressing

## 2025-08-10 NOTE — PROGRESS NOTES
Occupational Therapy    OT Treatment    Patient Name: Jessy Masterson  MRN: [id number]  Department: [place] [id number]  Room: [address] [place] [address]  Today's Date: 8/10/2025  Time Calculation  Start Time: 1209  Stop Time: 1221  Time Calculation (min): 12 min        Assessment:  OT Assessment: Pt progressing towards goals. Family is supportive but cannot provide support during the day. Pt is moving well and would benefit from low intensity OT at discharge.  Prognosis: Good  Barriers to Discharge Home: Cognition needs  Cognition Needs: 24hr supervision for safety awareness needed, Cognition-related high falls risk, Insight of patient limited regarding functional ability/needs, Recollection or understanding of precautions/restrictions limited  Evaluation/Treatment Tolerance: Patient tolerated treatment well  Medical Staff Made Aware: Yes  End of Session Communication: Bedside nurse  End of Session Patient Position: Up in chair, Alarm on  OT Assessment Results: Decreased ADL status, Decreased upper extremity strength, Decreased safe judgment during ADL, Decreased cognition, Decreased endurance, Decreased functional mobility, Decreased trunk control for functional activities, Decreased IADLs  Prognosis: Good  Barriers to Discharge: Decreased caregiver support  Evaluation/Treatment Tolerance: Patient tolerated treatment well  Medical Staff Made Aware: Yes  Strengths: Housing layout, Support and attitude of living partners  Barriers to Participation: Support of Caregivers, Comorbidities  Plan:  Treatment Interventions: ADL retraining, Functional transfer training, UE strengthening/ROM, Endurance training, Cognitive reorientation, Patient/family training, Equipment evaluation/education, Compensatory technique education  OT Frequency: 3 times per week  OT Discharge Recommendations: Low intensity level of continued care, 24 hr supervision due to cognition  Equipment Recommended upon Discharge: Wheeled walker  OT Recommended Transfer  Status: Stand by assist, Assist of 1  OT - OK to Discharge:  (per POC)  Treatment Interventions: ADL retraining, Functional transfer training, UE strengthening/ROM, Endurance training, Cognitive reorientation, Patient/family training, Equipment evaluation/education, Compensatory technique education    Subjective   OT Visit Info:  OT Received On: 08/10/25  General Visit Info:  General  Reason for Referral: Pt is an 89 y/o female who presented with c/o epigastric pain and syncope. Pt admitted to [facility] [place], [place] for treatment of Acute choledocholithiasis, Transaminitis, Acute kidney injury with anticipated lap dominic tomorrow (8/7).  Referred By: Kendra Jones, APRN-CNP  Past Medical History Relevant to Rehab: Medical History[1]  Family/Caregiver Present: Yes  Caregiver Feedback: daughter present and assists with translating however speaks over patient and does seem to answer for pt  Prior to Session Communication: Bedside nurse  Patient Position Received: Bed, 3 rail up, Alarm on  Preferred Learning Style: auditory, verbal, visual  General Comment: agreeable to OT  Precautions:  Medical Precautions: Fall precautions  Post-Surgical Precautions: Abdominal surgery precautions  Pain:  Pain Assessment  0-10 (Numeric) Pain Score: 6  Pain Type: Surgical pain  Pain Location: Abdomen    Objective    Cognition:  Cognition  Orientation Level: Disoriented to time  Bed Mobility/Transfers: Bed Mobility 1  Bed Mobility 1: Supine to sitting  Level of Assistance 1: Contact guard, Minimal tactile cues, Minimal verbal cues  Bed Mobility Comments 1: tactile cues for logroll for abd precautions    Transfer 1  Transfer From 1: Bed to  Transfer to 1: Stand  Technique 1: Sit to stand, Stand to sit  Transfer Device 1: Gait belt, Walker  Transfer Level of Assistance 1: Contact guard  Trials/Comments 1: cues to push up from bed    Functional Mobility:  Functional Mobility 1  Surface 1: Level tile  Device 1: Rolling walker  Functional Mobility Support  Devices: Gait belt  Assistance 1: Close supervision  Comments 1: Min HH distances around hospital rooms including 2 turns and backing up to chair    Outcome Measures:[facility] Daily Activity  Putting on and taking off regular lower body clothing: A little  Bathing (including washing, rinsing, drying): A little  Putting on and taking off regular upper body clothing: None  Toileting, which includes using toilet, bedpan or urinal: A little  Taking care of personal grooming such as brushing teeth: None  Eating Meals: None  Daily Activity - Total Score: 21      Education Documentation  Body Mechanics, taught by Suad Shultz OT at 8/10/2025  1:16 PM.  Learner: Family, Patient  Readiness: Acceptance  Method: Explanation  Response: Verbalizes Understanding    Precautions, taught by Suad Shultz OT at 8/10/2025  1:16 PM.  Learner: Family, Patient  Readiness: Acceptance  Method: Explanation  Response: Verbalizes Understanding    ADL Training, taught by Suad Shultz OT at 8/10/2025  1:16 PM.  Learner: Family, Patient  Readiness: Acceptance  Method: Explanation  Response: Verbalizes Understanding    Education Comments  No comments found.      Goals:  Encounter Problems       Encounter Problems (Active)       ADLs       Patient will perform UB and LB bathing with stand by assist level of assistance and grab bars, shower chair, and long-handled sponge. (Progressing)       Start:  08/07/25    Expected End:  08/21/25            Patient with complete upper body dressing with set-up level of assistance donning and doffing all UE clothes with PRN adaptive equipment. (Progressing)       Start:  08/07/25    Expected End:  08/21/25            Patient with complete lower body dressing with stand by assist level of assistance donning and doffing all LE clothes  with PRN adaptive equipment. (Progressing)       Start:  08/07/25    Expected End:  08/21/25            Patient will complete daily grooming tasks with modified independent  level of assistance and PRN adaptive equipment while standing. (Progressing)       Start:  08/07/25    Expected End:  08/21/25            Patient will complete toileting including hygiene clothing management/hygiene with modified independent level of assistance and raised toilet seat and grab bars. (Progressing)       Start:  08/07/25    Expected End:  08/21/25               MOBILITY       Patient will perform Functional mobility x Household distances/Community Distances with supervision level of assistance and least restrictive device in order to improve safety and functional mobility. (Progressing)       Start:  08/07/25    Expected End:  08/21/25               TRANSFERS       Patient will perform bed mobility modified independent level of assistance via log roll in order to improve safety and independence with mobility (Progressing)       Start:  08/07/25    Expected End:  08/21/25            Patient will complete functional transfers with least restrictive device with supervision level of assistance. 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

## 2025-08-10 NOTE — PROGRESS NOTES
Care Coordinator Note:    TCC spoke with dtr in depth regarding PT OT evals and recommendations based on patients scores. Patient [facility] is 19- [employer] mcare will not approve SNF stay.  Patient dtr concerned patient is weaker than normal baseline and worried about patient being at home alone while she works 9-5 all week. TCC offerred information for private pay agencies as well as setting up Homecare RN PT OT HHA. Patients dtr explained that due to patients dementia and polish speaking that she does not allow people into the home and that she did not want either of these services.   Dtr started [facility] process a few days ago. TCC/SW discussed waiver program options through [facility] and referral was made.     ADOD: Dc Monday home no needs unless dtr changes mind on [facility] set up.     Swati Molina TCC      08/10/25 1985   Discharge Planning   Living Arrangements Children   Expected Discharge Disposition Home  (REF [facility].)   Does the patient need discharge transport arranged? Yes  (dtr will be ride at [facility])   [facility] coordination needed? Yes   Has discharge transport been arranged? No   Intensity of Service   Intensity of Service 0-30 min

## 2025-08-10 NOTE — PROGRESS NOTES
Jessy Masterson is a 89 y.o. female     Status post cholecystectomy  Resting comfortably    Review of Systems     Constitutional: no fever, no chills, not feeling poorly, not feeling tired   Cardiovascular: no chest pain   Respiratory: no cough, wheezing or shortness of breath a  Gastrointestinal: no abdominal pain, no constipation, no melena, no nausea, no diarrhea, no vomiting and no blood in stools.   Neurological: no headache,   All other systems have been reviewed and are negative for complaint.       Vitals:       BP: 180/83   Pulse: 92   Resp: 18   Temp: 36.2 °C (97.1 °F)   SpO2: 90%        Scheduled medications  Scheduled Medications[1]  Continuous medications  Continuous Medications[2]  PRN medications  PRN Medications[3]    Lab Review   Results from last 7 days   Lab Units 08/10/25  0714 08/09/25  0608 08/08/25  0537   WBC AUTO x10*3/uL 10.9 11.8* 7.4   HEMOGLOBIN g/dL 12.3 12.4 11.7*   HEMATOCRIT % 36.9 38.8 35.6*   PLATELETS AUTO x10*3/uL 353 328 311     Results from last 7 days   Lab Units 08/10/25  0714 08/09/25  0608 08/08/25  0537   SODIUM mmol/L 134* 136 139   POTASSIUM mmol/L 4.0 3.9 3.4*   CHLORIDE mmol/L 101 103 105   CO2 mmol/L 23 23 27   BUN mg/dL 20 24* 23   CREATININE mg/dL 0.82 0.96 1.03   CALCIUM mg/dL 8.4* 8.3* 8.2*   PROTEIN TOTAL g/dL 5.3* 6.0* 5.6*   BILIRUBIN TOTAL mg/dL 1.5* 1.4* 1.6*   ALK PHOS U/L 249* 271* 286*   ALT U/L 91* 108* 101*   AST U/L 74* 102* 65*   GLUCOSE mg/dL 100* 104* 100*     Results from last 7 days   Lab Units 08/04/25  1728   TROPHS ng/L 19*        FL GI ERCP   Final Result            Physical Exam    Constitutional   General appearance: Alert and in no acute distress. Resting in bed  Pulmonary   Respiratory assessment: No respiratory distress, normal respiratory rhythm and effort.    Auscultation of Lungs: Clear bilateral breath sounds.   Cardiovascular   Auscultation of heart: Apical pulse normal, heart rate and rhythm normal, normal S1 and S2, no  murmurs and no pericardial rub.    Exam for edema: No peripheral edema.   Abdomen   Abdominal Exam: No bruits, normal bowel sounds, soft, mild tendernesss no abdominal mass palpated.    Liver and Spleen exam: No hepato-splenomegaly.   Musculoskeletal     Inspection/palpation of joints, bones and muscles: No joint swelling. Normal movement of all extremities.   Neurologic   Cranial nerves: Nerves 2-12 were intact, no focal neuro defects.         Assessment/Plan      #Acute choledocholithiasis  #Transaminitis    Status postcholecystectomy  Stable  Surgery following   Noted input from discharge coordinator and planning for snf  Will go on oral ciprofloxacin and Flagyl for 1 more week     #Hypertension  Moniotr will resume [facility] meds    Talha Liu MD                     [1] ciprofloxacin, 400 mg, intravenous, q12h  enoxaparin, 40 mg, subcutaneous, q24h  insulin lispro, 0-5 Units, subcutaneous, Before meals & nightly  latanoprost, 1 drop, Both Eyes, Nightly  lidocaine, 1 patch, transdermal, Daily  lisinopril, 40 mg, oral, Daily  metroNIDAZOLE, 500 mg, intravenous, q8h  pantoprazole, 40 mg, oral, Daily before breakfast   Or  pantoprazole, 40 mg, intravenous, Daily before breakfast  polyethylene glycol, 17 g, oral, Daily     [2]    [3] PRN medications: acetaminophen **OR** acetaminophen **OR** acetaminophen, guaiFENesin, hydrALAZINE, morphine, ondansetron **OR** ondansetron, traMADol

## 2025-08-10 NOTE — PROGRESS NOTES
08/10/25 1320   Discharge Planning   Living Arrangements Children   Support Systems Children   Type of Residence Private residence     Per request of patients daughter, SW completed referral to [facility] for in home evaluation of Waiver program services.   Daughter coached on returning calls to [facility] to set up home visit date and time.

## 2025-08-10 NOTE — PROGRESS NOTES
Jessy Masterson is a 89 y.o. female     Status post cholecystectomy  Resting comfortably    Review of Systems     Constitutional: no fever, no chills, not feeling poorly, not feeling tired   Cardiovascular: no chest pain   Respiratory: no cough, wheezing or shortness of breath a  Gastrointestinal: no abdominal pain, no constipation, no melena, no nausea, no diarrhea, no vomiting and no blood in stools.   Neurological: no headache,   All other systems have been reviewed and are negative for complaint.       Vitals:       BP: 180/83   Pulse: 92   Resp: 18   Temp: 36.2 °C (97.1 °F)   SpO2: 90%        Scheduled medications  Scheduled Medications[1]  Continuous medications  Continuous Medications[2]  PRN medications  PRN Medications[3]    Lab Review   Results from last 7 days   Lab Units 08/09/25  0608 08/08/25  0537 08/07/25  0741   WBC AUTO x10*3/uL 11.8* 7.4 6.8   HEMOGLOBIN g/dL 12.4 11.7* 13.2   HEMATOCRIT % 38.8 35.6* 38.5   PLATELETS AUTO x10*3/uL 328 311 344     Results from last 7 days   Lab Units 08/09/25  0608 08/08/25  0537 08/07/25  0741   SODIUM mmol/L 136 139 138   POTASSIUM mmol/L 3.9 3.4* 4.1   CHLORIDE mmol/L 103 105 101   CO2 mmol/L 23 27 23   BUN mg/dL 24* 23 16   CREATININE mg/dL 0.96 1.03 0.84   CALCIUM mg/dL 8.3* 8.2* 8.4*   PROTEIN TOTAL g/dL 6.0* 5.6* 5.7*   BILIRUBIN TOTAL mg/dL 1.4* 1.6* 2.1*   ALK PHOS U/L 271* 286* 392*   ALT U/L 108* 101* 136*   AST U/L 102* 65* 71*   GLUCOSE mg/dL 104* 100* 119*     Results from last 7 days   Lab Units 08/04/25  1728   TROPHS ng/L 19*        FL GI ERCP   Final Result            Physical Exam    Constitutional   General appearance: Alert and in no acute distress. Resting in bed  Pulmonary   Respiratory assessment: No respiratory distress, normal respiratory rhythm and effort.    Auscultation of Lungs: Clear bilateral breath sounds.   Cardiovascular   Auscultation of heart: Apical pulse normal, heart rate and rhythm normal, normal S1 and S2, no  murmurs and no pericardial rub.    Exam for edema: No peripheral edema.   Abdomen   Abdominal Exam: No bruits, normal bowel sounds, soft, mild tendernesss no abdominal mass palpated.    Liver and Spleen exam: No hepato-splenomegaly.   Musculoskeletal     Inspection/palpation of joints, bones and muscles: No joint swelling. Normal movement of all extremities.   Neurologic   Cranial nerves: Nerves 2-12 were intact, no focal neuro defects.         Assessment/Plan      #Acute choledocholithiasis  #Transaminitis    Status postcholecystectomy  Stable  Surgery following   Noted input from discharge coordinator and planning for snf  Will go on oral ciprofloxacin and Flagyl for 1 more week     #Hypertension  Moniotr will resume e meds    Talha Liu MD                   [1] ciprofloxacin, 400 mg, intravenous, q12h  enoxaparin, 40 mg, subcutaneous, q24h  insulin lispro, 0-5 Units, subcutaneous, Before meals & nightly  lidocaine, 1 patch, transdermal, Daily  metroNIDAZOLE, 500 mg, intravenous, q8h  pantoprazole, 40 mg, oral, Daily before breakfast   Or  pantoprazole, 40 mg, intravenous, Daily before breakfast  polyethylene glycol, 17 g, oral, Daily     [2]    [3] PRN medications: acetaminophen **OR** acetaminophen **OR** acetaminophen, guaiFENesin, hydrALAZINE, morphine, ondansetron **OR** ondansetron, traMADol

## 2025-08-11 ENCOUNTER — PHARMACY VISIT (OUTPATIENT)
Dept: PHARMACY | Facility: CLINIC | Age: 89
End: 2025-08-11
Payer: COMMERCIAL

## 2025-08-11 ENCOUNTER — HOME HEALTH ADMISSION (OUTPATIENT)
Dept: HOME HEALTH SERVICES | Facility: HOME HEALTH | Age: 89
End: 2025-08-11
Payer: MEDICARE

## 2025-08-11 VITALS
DIASTOLIC BLOOD PRESSURE: 77 MMHG | BODY MASS INDEX: 32.38 KG/M2 | HEART RATE: 81 BPM | TEMPERATURE: 98.6 F | SYSTOLIC BLOOD PRESSURE: 153 MMHG | HEIGHT: 60 IN | OXYGEN SATURATION: 98 % | WEIGHT: 164.9 LBS | RESPIRATION RATE: 16 BRPM

## 2025-08-11 LAB
ALBUMIN SERPL BCP-MCNC: 3 G/DL (ref 3.4–5)
ALP SERPL-CCNC: 195 U/L (ref 33–136)
ALT SERPL W P-5'-P-CCNC: 66 U/L (ref 7–45)
ANION GAP SERPL CALC-SCNC: 13 MMOL/L (ref 10–20)
AST SERPL W P-5'-P-CCNC: 42 U/L (ref 9–39)
BILIRUB SERPL-MCNC: 1.3 MG/DL (ref 0–1.2)
BUN SERPL-MCNC: 18 MG/DL (ref 6–23)
CALCIUM SERPL-MCNC: 8.4 MG/DL (ref 8.6–10.3)
CHLORIDE SERPL-SCNC: 102 MMOL/L (ref 98–107)
CO2 SERPL-SCNC: 23 MMOL/L (ref 21–32)
CREAT SERPL-MCNC: 0.82 MG/DL (ref 0.5–1.05)
EGFRCR SERPLBLD CKD-EPI 2021: 69 ML/MIN/1.73M*2
ERYTHROCYTE [DISTWIDTH] IN BLOOD BY AUTOMATED COUNT: 14.2 % (ref 11.5–14.5)
GLUCOSE BLD MANUAL STRIP-MCNC: 117 MG/DL (ref 74–99)
GLUCOSE BLD MANUAL STRIP-MCNC: 120 MG/DL (ref 74–99)
GLUCOSE BLD MANUAL STRIP-MCNC: 153 MG/DL (ref 74–99)
GLUCOSE SERPL-MCNC: 118 MG/DL (ref 74–99)
HCT VFR BLD AUTO: 36.4 % (ref 36–46)
HGB BLD-MCNC: 11.6 G/DL (ref 12–16)
MCH RBC QN AUTO: 31.1 PG (ref 26–34)
MCHC RBC AUTO-ENTMCNC: 31.9 G/DL (ref 32–36)
MCV RBC AUTO: 98 FL (ref 80–100)
NRBC BLD-RTO: 0 /100 WBCS (ref 0–0)
PLATELET # BLD AUTO: 354 X10*3/UL (ref 150–450)
POTASSIUM SERPL-SCNC: 3.7 MMOL/L (ref 3.5–5.3)
PROT SERPL-MCNC: 5.7 G/DL (ref 6.4–8.2)
RBC # BLD AUTO: 3.73 X10*6/UL (ref 4–5.2)
SODIUM SERPL-SCNC: 134 MMOL/L (ref 136–145)
WBC # BLD AUTO: 8.2 X10*3/UL (ref 4.4–11.3)

## 2025-08-11 PROCEDURE — RXMED WILLOW AMBULATORY MEDICATION CHARGE

## 2025-08-11 PROCEDURE — 82947 ASSAY GLUCOSE BLOOD QUANT: CPT

## 2025-08-11 PROCEDURE — 85027 COMPLETE CBC AUTOMATED: CPT | Performed by: NURSE PRACTITIONER

## 2025-08-11 PROCEDURE — 2500000001 HC RX 250 WO HCPCS SELF ADMINISTERED DRUGS (ALT 637 FOR MEDICARE OP): Performed by: INTERNAL MEDICINE

## 2025-08-11 PROCEDURE — 2500000001 HC RX 250 WO HCPCS SELF ADMINISTERED DRUGS (ALT 637 FOR MEDICARE OP): Performed by: FAMILY MEDICINE

## 2025-08-11 PROCEDURE — 2500000004 HC RX 250 GENERAL PHARMACY W/ HCPCS (ALT 636 FOR OP/ED): Performed by: STUDENT IN AN ORGANIZED HEALTH CARE EDUCATION/TRAINING PROGRAM

## 2025-08-11 PROCEDURE — 36415 COLL VENOUS BLD VENIPUNCTURE: CPT | Performed by: NURSE PRACTITIONER

## 2025-08-11 PROCEDURE — 99239 HOSP IP/OBS DSCHRG MGMT >30: CPT | Performed by: INTERNAL MEDICINE

## 2025-08-11 PROCEDURE — 97110 THERAPEUTIC EXERCISES: CPT | Mod: GP

## 2025-08-11 PROCEDURE — 80053 COMPREHEN METABOLIC PANEL: CPT | Performed by: NURSE PRACTITIONER

## 2025-08-11 PROCEDURE — 97116 GAIT TRAINING THERAPY: CPT | Mod: GP

## 2025-08-11 PROCEDURE — 2500000001 HC RX 250 WO HCPCS SELF ADMINISTERED DRUGS (ALT 637 FOR MEDICARE OP): Performed by: NURSE PRACTITIONER

## 2025-08-11 RX ORDER — AMOXICILLIN 250 MG
1 CAPSULE ORAL 2 TIMES DAILY
Status: DISCONTINUED | OUTPATIENT
Start: 2025-08-11 | End: 2025-08-11 | Stop reason: HOSPADM

## 2025-08-11 RX ORDER — METOPROLOL SUCCINATE 50 MG/1
50 TABLET, EXTENDED RELEASE ORAL DAILY
Status: DISCONTINUED | OUTPATIENT
Start: 2025-08-11 | End: 2025-08-11 | Stop reason: HOSPADM

## 2025-08-11 RX ORDER — METRONIDAZOLE 500 MG/1
500 TABLET ORAL EVERY 8 HOURS SCHEDULED
Qty: 3 TABLET | Refills: 0 | Status: SHIPPED | OUTPATIENT
Start: 2025-08-11 | End: 2025-08-12

## 2025-08-11 RX ORDER — CIPROFLOXACIN 500 MG/1
500 TABLET, FILM COATED ORAL EVERY 12 HOURS SCHEDULED
Qty: 2 TABLET | Refills: 0 | Status: SHIPPED | OUTPATIENT
Start: 2025-08-11 | End: 2025-08-12

## 2025-08-11 RX ORDER — AMOXICILLIN 250 MG
1 CAPSULE ORAL 2 TIMES DAILY
Qty: 60 TABLET | Refills: 0 | Status: SHIPPED | OUTPATIENT
Start: 2025-08-11

## 2025-08-11 RX ORDER — BISACODYL 10 MG/1
10 SUPPOSITORY RECTAL DAILY
Status: DISCONTINUED | OUTPATIENT
Start: 2025-08-11 | End: 2025-08-11 | Stop reason: HOSPADM

## 2025-08-11 RX ADMIN — PANTOPRAZOLE SODIUM 40 MG: 40 TABLET, DELAYED RELEASE ORAL at 06:23

## 2025-08-11 RX ADMIN — CIPROFLOXACIN 500 MG: 500 TABLET ORAL at 09:22

## 2025-08-11 RX ADMIN — METOPROLOL SUCCINATE 50 MG: 50 TABLET, EXTENDED RELEASE ORAL at 09:22

## 2025-08-11 RX ADMIN — LISINOPRIL 40 MG: 20 TABLET ORAL at 09:21

## 2025-08-11 RX ADMIN — METRONIDAZOLE 500 MG: 500 TABLET ORAL at 06:23

## 2025-08-11 RX ADMIN — METRONIDAZOLE 500 MG: 500 TABLET ORAL at 13:33

## 2025-08-11 RX ADMIN — SENNOSIDES AND DOCUSATE SODIUM 1 TABLET: 50; 8.6 TABLET ORAL at 13:33

## 2025-08-11 RX ADMIN — POLYETHYLENE GLYCOL 3350 17 G: 17 POWDER, FOR SOLUTION ORAL at 09:22

## 2025-08-11 ASSESSMENT — COGNITIVE AND FUNCTIONAL STATUS - GENERAL
MOVING TO AND FROM BED TO CHAIR: A LITTLE
HELP NEEDED FOR BATHING: A LITTLE
MOVING FROM LYING ON BACK TO SITTING ON SIDE OF FLAT BED WITH BEDRAILS: A LITTLE
TOILETING: A LITTLE
CLIMB 3 TO 5 STEPS WITH RAILING: A LITTLE
STANDING UP FROM CHAIR USING ARMS: A LITTLE
MOBILITY SCORE: 17
DRESSING REGULAR LOWER BODY CLOTHING: A LITTLE
CLIMB 3 TO 5 STEPS WITH RAILING: A LITTLE
WALKING IN HOSPITAL ROOM: A LITTLE
TURNING FROM BACK TO SIDE WHILE IN FLAT BAD: A LITTLE
MOVING TO AND FROM BED TO CHAIR: A LITTLE
STANDING UP FROM CHAIR USING ARMS: A LOT
WALKING IN HOSPITAL ROOM: A LITTLE
MOVING FROM LYING ON BACK TO SITTING ON SIDE OF FLAT BED WITH BEDRAILS: A LITTLE
TURNING FROM BACK TO SIDE WHILE IN FLAT BAD: A LITTLE
DRESSING REGULAR UPPER BODY CLOTHING: A LITTLE
DAILY ACTIVITIY SCORE: 20
MOBILITY SCORE: 18

## 2025-08-11 ASSESSMENT — PAIN - FUNCTIONAL ASSESSMENT
PAIN_FUNCTIONAL_ASSESSMENT: 0-10
PAIN_FUNCTIONAL_ASSESSMENT: 0-10

## 2025-08-11 ASSESSMENT — PAIN SCALES - GENERAL
PAINLEVEL_OUTOF10: 3
PAINLEVEL_OUTOF10: 0 - NO PAIN

## 2025-08-11 NOTE — CARE PLAN
The patient's goals for the shift include safety    The clinical goals for the shift include patient livia remain free from injuries      Problem: Pain - Adult  Goal: Verbalizes/displays adequate comfort level or baseline comfort level  Outcome: Progressing     Problem: Safety - Adult  Goal: Free from fall injury  Outcome: Progressing     Problem: Discharge Planning  Goal: Discharge to home or other facility with appropriate resources  Outcome: Progressing     Problem: Chronic Conditions and Co-morbidities  Goal: Patient's chronic conditions and co-morbidity symptoms are monitored and maintained or improved  Outcome: Progressing     Problem: Nutrition  Goal: Nutrient intake appropriate for maintaining nutritional needs  Outcome: Progressing     Problem: Pain  Goal: Takes deep breaths with improved pain control throughout the shift  Outcome: Progressing  Goal: Turns in bed with improved pain control throughout the shift  Outcome: Progressing  Goal: Walks with improved pain control throughout the shift  Outcome: Progressing  Goal: Performs ADL's with improved pain control throughout shift  Outcome: Progressing  Goal: Participates in PT with improved pain control throughout the shift  Outcome: Progressing  Goal: Free from opioid side effects throughout the shift  Outcome: Progressing  Goal: Free from acute confusion related to pain meds throughout the shift  Outcome: Progressing     Problem: Fall/Injury  Goal: Not fall by end of shift  Outcome: Progressing  Goal: Be free from injury by end of the shift  Outcome: Progressing  Goal: Verbalize understanding of personal risk factors for fall in the hospital  Outcome: Progressing  Goal: Verbalize understanding of risk factor reduction measures to prevent injury from fall in the home  Outcome: Progressing  Goal: Use assistive devices by end of the shift  Outcome: Progressing  Goal: Pace activities to prevent fatigue by end of the shift  Outcome: Progressing     Problem:  Skin  Goal: Decreased wound size/increased tissue granulation at next dressing change  Outcome: Progressing  Goal: Participates in plan/prevention/treatment measures  Outcome: Progressing  Goal: Prevent/manage excess moisture  Outcome: Progressing  Goal: Prevent/minimize sheer/friction injuries  Outcome: Progressing  Goal: Promote/optimize nutrition  Outcome: Progressing  Goal: Promote skin healing  Outcome: Progressing

## 2025-08-11 NOTE — DISCHARGE SUMMARY
Discharge Diagnosis  Choledocholithiasis           Issues Requiring Follow-Up  Follow-up with surgery postop    Discharge Meds     Medication List      START taking these medications     acetaminophen 325 mg tablet; Commonly known as: Tylenol; Take 2 tablets   (650 mg) by mouth every 6 hours if needed for mild pain (1 - 3).   ciprofloxacin 500 mg tablet; Commonly known as: Cipro; Take 1 tablet   (500 mg) by mouth every 12 hours for 2 doses.   metroNIDAZOLE 500 mg tablet; Commonly known as: Flagyl; Take 1 tablet   (500 mg) by mouth every 8 hours for 3 doses.   ondansetron 4 mg tablet; Commonly known as: Zofran; Take 1 tablet (4 mg)   by mouth every 8 hours if needed for nausea or vomiting.   polyethylene glycol 17 gram packet; Commonly known as: Glycolax,   Miralax; Take 17 g by mouth once daily as needed (consitpation).   sennosides-docusate sodium 8.6-50 mg tablet; Commonly known as:   Leatha-Colace; Take 1 tablet by mouth 2 times a day.   traMADol 50 mg tablet; Commonly known as: Ultram; Take 1 tablet (50 mg)   by mouth every 8 hours if needed for severe pain (7 - 10).     CONTINUE taking these medications     cholecalciferol 25 mcg (1,000 units) capsule; Commonly known as: Vitamin   D-3   cyanocobalamin 1,000 mcg tablet; Commonly known as: Vitamin B-12   latanoprost 0.005 % ophthalmic solution; Commonly known as: Xalatan   lisinopril 40 mg tablet; TAKE ONE TABLET BY MOUTH ONCE A DAY   omeprazole 40 mg DR capsule; Commonly known as: PriLOSEC; Take 1 capsule   (40 mg) by mouth once daily in the morning. Take before meals. Do not   crush or chew.       Test Results Pending At Discharge  Pending Labs       Order Current Status    Surgical Pathology Exam In process            Hospital Course   Patient with a past medical history of hypertension and obesity presents with progressively worsening abdominal pain over the last few few weeks  Pain gets aggravated on eating and associated with nausea  Saw her primary care  doctor who ordered us a CAT scan of the abdomen and pelvis which shows choledocholithiasis with dilated bile ducts  And underwent a successful ERCP followed by cholecystectomy  Did very well postop blood pressures have remained elevated    We added amlodipine to the patient's regimen  Seen by physical Occupational Therapy and cleared for discharge home  Will discharge in stable condition  She will follow-up with surgery for postop care    Pertinent Physical Exam At Time of Discharge  Physical Exam    Constitutional   General appearance: Alert and in no acute distress.     Pulmonary   Respiratory assessment: No respiratory distress, normal respiratory rhythm and effort.    Auscultation of Lungs: Clear bilateral breath sounds.   Cardiovascular   Auscultation of heart: Apical pulse normal, heart rate and rhythm normal, normal S1 and S2, no murmurs and no pericardial rub.    Exam for edema: No peripheral edema.   Abdomen   Abdominal Exam: No bruits, normal bowel sounds, soft, non-tender, no abdominal mass palpated.    Liver and Spleen exam: No hepato-splenomegaly.   Musculoskeletal   Examination of gait: Normal.    Inspection of digits and nails: No clubbing or cyanosis of the fingernails.    Inspection/palpation of joints, bones and muscles: No joint swelling. Normal movement of all extremities.   Skin   Skin inspection: Normal skin color and pigmentation, normal skin turgor and no visible rash.   Neurologic   Cranial nerves: Nerves 2-12 were intact, no focal neuro defects.    Outpatient Follow-Up  Future Appointments   Date Time Provider Department Center   12/16/2025  8:30 AM Angelita CHAPMAN DO [id number] [place]     Patient seen at bedside. Events from the last visit reviewed. Discussed with staff. Results of tests and investigations from last visit reviewed and discussed with patient/Family. Electronic chart on [facility] reviewed. Input / Recommendations  from consultants  appreciated and reviewed and agreed with.      discharge summary and profile completed. medications reviewed and discussed with patient and family.  scripts completed and signed.     total discharge time in excess of 30 minutes.      Chana Whelan MD

## 2025-08-11 NOTE — PROGRESS NOTES
Physical Therapy    Physical Therapy Treatment    Patient Name: Jessy Masterson  MRN: [id number]  Department: [facility] [address]  Room: [address] [place] [address]  Today's Date: 8/11/2025  Time Calculation  Start Time: 1141  Stop Time: 1213  Time Calculation (min): 32 min     Completion of this session, clinical decision making, and documentation performed under the supervision/direction of Mary Reeves DPT.     Assessment/Plan   PT Assessment  PT Assessment Results: Decreased strength, Decreased range of motion, Decreased endurance, Impaired balance, Pain, Decreased mobility  Rehab Prognosis: Excellent  Barriers to Discharge Home: No anticipated barriers  Evaluation/Treatment Tolerance: Patient tolerated treatment well  Medical Staff Made Aware: Yes  Strengths: Housing layout, Support and attitude of living partners  Barriers to Participation: Comorbidities  End of Session Communication: Bedside nurse, Care Coordinator  Assessment Comment: Pt doing well this session. Pt with improved communication and responses this session with use of google translate. Pt able to negotiate x4 steps this session with CGA. Patient remains with weakness and functional mobility deficits, and will benefit from ongoing PT to address impairments in mobility, balance, strength, activity tolerance, pain, and safety.  End of Session Patient Position: Up in chair, Alarm on  PT Plan  Inpatient/Swing Bed or Outpatient: Inpatient  PT Plan  Treatment/Interventions: Bed mobility, Transfer training, Gait training, Stair training, Balance training, Neuromuscular re-education, Strengthening, Endurance training, Therapeutic exercise, Therapeutic activity  PT Plan: Ongoing PT  PT Frequency: 4 times per week  PT Discharge Recommendations: Low intensity level of continued care  Equipment Recommended upon Discharge: Wheeled walker  PT Recommended Transfer Status: Assist x1, Assistive device (2WW)  PT - OK to Discharge: Yes (Per PT POC)    PT Visit Info:  PT Received On:  08/11/25     General Visit Information:   General  Reason for Referral: Pt is an 88 y/o female who presented with c/o epigastric pain and syncope. [place] [facility] [place]ed[place], Transaminitis, Acute kidney injury with lap dominic on 8/8.  Referred By: TOMMY Corado  Past Medical History Relevant to Rehab: Medical History[1] Surgical History[2]   Family/Caregiver Present: No  Prior to Session Communication: Bedside nurse  Patient Position Received: Up in chair, Alarm on  Preferred Learning Style: auditory, verbal, visual  General Comment: Pt pleasant and agreeable to PT session. Communicated with pt throughout session using google translate to Polish. Pt able to read translation and respond appropriately. Pt reporting that she is able to have assistance at home upon d/c.    Subjective   Precautions:  Precautions  Medical Precautions: Fall precautions  Post-Surgical Precautions: Abdominal surgery precautions    Objective   Pain:  Pain Assessment  Pain Assessment: 0-10  0-10 (Numeric) Pain Score: 3  Pain Type: Surgical pain  Pain Location: Abdomen  Pain Orientation: Right  Pain Frequency: Constant/continuous  Pain Onset: Ongoing  Pain Interventions: Repositioned, Ambulation/increased activity, Distraction  Response to Interventions: Content/relaxed  Cognition:  Cognition  Overall Cognitive Status: Within Functional Limits  Orientation Level: Unable to assess (Difficulty with communication of orientation questions d/t language barrier)  Processing Speed: Delayed  Coordination:  Movements are Fluid and Coordinated: Yes  Postural Control:  Postural Control  Postural Control: Within Functional Limits  Static Sitting Balance  Static Sitting-Balance Support: Bilateral upper extremity supported, Feet supported  Static Sitting-Level of Assistance: Close supervision  Dynamic Sitting Balance  Dynamic Sitting-Balance Support: Feet supported, Bilateral upper extremity supported  Dynamic  Sitting-Level of Assistance: Close supervision  Dynamic Sitting-Balance: Lateral lean, Forward lean, Trunk control activities  Static Standing Balance  Static Standing-Balance Support: Bilateral upper extremity supported  Static Standing-Level of Assistance: Contact guard  Dynamic Standing Balance  Dynamic Standing-Balance Support: Bilateral upper extremity supported  Dynamic Standing-Level of Assistance: Contact guard, Minimum assistance  Dynamic Standing-Balance: Lateral lean, Forward lean, Turning    Activity Tolerance:  Activity Tolerance  Endurance: Tolerates 30 min exercise with multiple rests  Treatments:  Therapeutic Activity  Therapeutic Activity Performed: Yes  Therapeutic Activity 1: Pt educated on breathing techniques, pain management strategies, continued progression after surgery, and safe performance of functional mobility at home.    Bed Mobility  Bed Mobility: No (Pt received up in chair and left in chair at end of session)    Ambulation/Gait Training  Ambulation/Gait Training Performed: Yes  Ambulation/Gait Training 1  Surface 1: Level tile  Device 1: Rolling walker  Gait Support Devices: Gait belt  Assistance 1: Contact guard, Minimum assistance, Moderate verbal cues, Moderate tactile cues  Quality of Gait 1: Diminished heel strike, Decreased step length (decreased foot clearance)  Comments/Distance (ft) 1: Pt ambulated x40', x50', and x10' x3 with Min A-CGA, 2WW, and increased time and effort. Pt required Min A initially, but able to be faded to CGA d/t increased balance and stability for remainder of session. Pt required verbal and tactile cueing 2WW management, staying inside the 2WW, and taking larger steps.  Transfers  Transfer: Yes  Transfer 1  Technique 1: Sit to stand, Stand to sit  Transfer Device 1: Gait belt, Walker  Transfer Level of Assistance 1: Moderate assistance, Minimum assistance, Moderate verbal cues, Moderate tactile cues  Trials/Comments 1: Pt performed x3 STS this session,  requiring Mod A for initial x2 trials and Min A for final trial. Pt required cueing for hand positioning, backing up to chair before sitting, and safety awareness.    Stairs  Stairs: Yes  Stairs  Rails 1: Bilateral  Curb Step 1: No  Device 1: Railing (bilateral)  Support Devices 1: Gait belt  Assistance 1: Contact guard, Minimal verbal cues  Comment/Number of Steps 1: Pt negotiated x4 stairs this session with CGA, bilateral handrails, and non-reciprocal pattern. Pt with improved control and stability when stepping down with her LLE, as compared to her RLE. Pt cued for sequencing and safety.    Outcome Measures:  [facility] Basic Mobility  Turning from your back to your side while in a flat bed without using bedrails: A little  Moving from lying on your back to sitting on the side of a flat bed without using bedrails: A little  Moving to and from bed to chair (including a wheelchair): A little  Standing up from a chair using your arms (e.g. wheelchair or bedside chair): A lot  To walk in hospital room: A little  Climbing 3-5 steps with railing: A little  Basic Mobility - Total Score: 17    Education Documentation  Precautions, taught by LEROY Cuevas at 8/11/2025  1:57 PM.  Learner: Patient  Readiness: Acceptance  Method: Explanation  Response: Verbalizes Understanding, Needs Reinforcement  Comment: See therapeutic activity section above    Body Mechanics, taught by LEROY Cuevas at 8/11/2025  1:57 PM.  Learner: Patient  Readiness: Acceptance  Method: Explanation  Response: Verbalizes Understanding, Needs Reinforcement  Comment: See therapeutic activity section above    Mobility Training, taught by LEROY Cuevas at 8/11/2025  1:57 PM.  Learner: Patient  Readiness: Acceptance  Method: Explanation  Response: Verbalizes Understanding, Needs Reinforcement  Comment: See therapeutic activity section above    Education Comments  No comments found.      Encounter Problems       Encounter Problems (Active)        Balance       LTG - Patient will demonstrate Intervention to enhance balance for safe completion of daily activities (Progressing)       Start:  08/07/25    Expected End:  08/21/25               Mobility       LTG - Patient will navigate 2 steps with HR used and min A. (Met)       Start:  08/07/25    Expected End:  08/21/25    Resolved:  08/11/25    Updated to: LTG - Patient will navigate 2 steps with HR used and SBA.    Update reason: Met         STG - Patient will ambulate mod I with LRAD and >75' (Progressing)       Start:  08/07/25    Expected End:  08/21/25            LTG - Patient will navigate 2 steps with HR used and SBA. (Progressing)       Start:  08/11/25    Expected End:  08/21/25                   PT Transfers       STG - Transfer from bed to chair with mod I and LRAD (Progressing)       Start:  08/07/25    Expected End:  08/21/25            STG - Patient will perform bed mobility independently. 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

## 2025-08-12 ENCOUNTER — DOCUMENTATION (OUTPATIENT)
Dept: HOME HEALTH SERVICES | Facility: HOME HEALTH | Age: 89
End: 2025-08-12
Payer: MEDICARE

## 2025-08-13 ENCOUNTER — HOME CARE VISIT (OUTPATIENT)
Dept: HOME HEALTH SERVICES | Facility: HOME HEALTH | Age: 89
End: 2025-08-13

## 2025-08-13 ENCOUNTER — PATIENT OUTREACH (OUTPATIENT)
Dept: PRIMARY CARE | Facility: CLINIC | Age: 89
End: 2025-08-13
Payer: MEDICARE

## 2025-08-14 RX ORDER — MEMANTINE HYDROCHLORIDE 10 MG/1
10 TABLET ORAL
Qty: 180 TABLET | Refills: 0 | OUTPATIENT
Start: 2025-08-14

## 2025-08-18 ENCOUNTER — APPOINTMENT (OUTPATIENT)
Dept: PRIMARY CARE | Facility: CLINIC | Age: 89
End: 2025-08-18
Payer: MEDICARE

## 2025-08-18 VITALS
SYSTOLIC BLOOD PRESSURE: 144 MMHG | DIASTOLIC BLOOD PRESSURE: 72 MMHG | WEIGHT: 161.5 LBS | BODY MASS INDEX: 31.54 KG/M2 | HEART RATE: 85 BPM | OXYGEN SATURATION: 95 %

## 2025-08-18 DIAGNOSIS — Z87.19 HISTORY OF GALLSTONES: ICD-10-CM

## 2025-08-18 DIAGNOSIS — R74.8 ELEVATED LIVER ENZYMES: ICD-10-CM

## 2025-08-18 DIAGNOSIS — I10 HYPERTENSION, UNSPECIFIED TYPE: ICD-10-CM

## 2025-08-18 DIAGNOSIS — Z09 HOSPITAL DISCHARGE FOLLOW-UP: Primary | ICD-10-CM

## 2025-08-18 DIAGNOSIS — D64.9 ANEMIA, UNSPECIFIED TYPE: ICD-10-CM

## 2025-08-18 DIAGNOSIS — R73.9 HYPERGLYCEMIA: ICD-10-CM

## 2025-08-18 DIAGNOSIS — D50.9 IRON DEFICIENCY ANEMIA, UNSPECIFIED IRON DEFICIENCY ANEMIA TYPE: ICD-10-CM

## 2025-08-18 DIAGNOSIS — N39.0 FREQUENT UTI: ICD-10-CM

## 2025-08-18 DIAGNOSIS — Z90.49 S/P LAPAROSCOPIC COLECTOMY: ICD-10-CM

## 2025-08-18 DIAGNOSIS — F03.90 DEMENTIA WITHOUT BEHAVIORAL DISTURBANCE, PSYCHOTIC DISTURBANCE, MOOD DISTURBANCE, OR ANXIETY, UNSPECIFIED DEMENTIA SEVERITY, UNSPECIFIED DEMENTIA TYPE: ICD-10-CM

## 2025-08-18 PROCEDURE — 1111F DSCHRG MED/CURRENT MED MERGE: CPT | Performed by: INTERNAL MEDICINE

## 2025-08-18 PROCEDURE — 1160F RVW MEDS BY RX/DR IN RCRD: CPT | Performed by: INTERNAL MEDICINE

## 2025-08-18 PROCEDURE — 1159F MED LIST DOCD IN RCRD: CPT | Performed by: INTERNAL MEDICINE

## 2025-08-18 PROCEDURE — 99496 TRANSJ CARE MGMT HIGH F2F 7D: CPT | Performed by: INTERNAL MEDICINE

## 2025-08-18 PROCEDURE — 1036F TOBACCO NON-USER: CPT | Performed by: INTERNAL MEDICINE

## 2025-08-18 PROCEDURE — 3077F SYST BP >= 140 MM HG: CPT | Performed by: INTERNAL MEDICINE

## 2025-08-18 PROCEDURE — 3078F DIAST BP <80 MM HG: CPT | Performed by: INTERNAL MEDICINE

## 2025-08-18 RX ORDER — MEMANTINE HYDROCHLORIDE 10 MG/1
10 TABLET ORAL 2 TIMES DAILY
Qty: 180 TABLET | Refills: 1 | Status: SHIPPED | OUTPATIENT
Start: 2025-08-18 | End: 2026-08-18

## 2025-08-18 RX ORDER — LISINOPRIL 40 MG/1
40 TABLET ORAL DAILY
Start: 2025-08-18

## 2025-08-18 ASSESSMENT — ENCOUNTER SYMPTOMS
CONSTIPATION: 0
NAUSEA: 0
VOMITING: 0
RECTAL PAIN: 0
BLOOD IN STOOL: 0
ABDOMINAL PAIN: 1
ANAL BLEEDING: 0
DIARRHEA: 0
ABDOMINAL DISTENTION: 1

## 2025-08-25 ENCOUNTER — PATIENT OUTREACH (OUTPATIENT)
Dept: PRIMARY CARE | Facility: CLINIC | Age: 89
End: 2025-08-25
Payer: MEDICARE

## 2025-08-25 LAB
LABORATORY COMMENT REPORT: NORMAL
PATH REPORT.FINAL DX SPEC: NORMAL
PATH REPORT.GROSS SPEC: NORMAL
PATH REPORT.RELEVANT HX SPEC: NORMAL
PATH REPORT.TOTAL CANCER: NORMAL

## 2025-08-28 ENCOUNTER — APPOINTMENT (OUTPATIENT)
Dept: SURGERY | Facility: CLINIC | Age: 89
End: 2025-08-28
Payer: MEDICARE

## 2025-08-28 VITALS
SYSTOLIC BLOOD PRESSURE: 150 MMHG | DIASTOLIC BLOOD PRESSURE: 84 MMHG | WEIGHT: 160 LBS | HEART RATE: 85 BPM | BODY MASS INDEX: 31.25 KG/M2

## 2025-08-28 DIAGNOSIS — K80.20 GALLSTONES: Primary | ICD-10-CM

## 2025-08-28 PROCEDURE — 3077F SYST BP >= 140 MM HG: CPT | Performed by: SURGERY

## 2025-08-28 PROCEDURE — 1111F DSCHRG MED/CURRENT MED MERGE: CPT | Performed by: SURGERY

## 2025-08-28 PROCEDURE — 99024 POSTOP FOLLOW-UP VISIT: CPT | Performed by: SURGERY

## 2025-08-28 PROCEDURE — 1159F MED LIST DOCD IN RCRD: CPT | Performed by: SURGERY

## 2025-08-28 PROCEDURE — 3079F DIAST BP 80-89 MM HG: CPT | Performed by: SURGERY

## 2025-08-31 LAB
ALBUMIN SERPL-MCNC: 4 G/DL (ref 3.6–5.1)
ALP SERPL-CCNC: 88 U/L (ref 37–153)
ALT SERPL-CCNC: 18 U/L (ref 6–29)
ANION GAP SERPL CALCULATED.4IONS-SCNC: 11 MMOL/L (CALC) (ref 7–17)
APPEARANCE UR: ABNORMAL
AST SERPL-CCNC: 26 U/L (ref 10–35)
BACTERIA #/AREA URNS HPF: ABNORMAL /HPF
BACTERIA UR CULT: ABNORMAL
BACTERIA UR CULT: ABNORMAL
BILIRUB SERPL-MCNC: 0.8 MG/DL (ref 0.2–1.2)
BILIRUB UR QL STRIP: NEGATIVE
BUN SERPL-MCNC: 15 MG/DL (ref 7–25)
CALCIUM SERPL-MCNC: 9.4 MG/DL (ref 8.6–10.4)
CAOX CRY #/AREA URNS HPF: ABNORMAL /HPF
CHLORIDE SERPL-SCNC: 104 MMOL/L (ref 98–110)
CO2 SERPL-SCNC: 27 MMOL/L (ref 20–32)
COLOR UR: ABNORMAL
CREAT SERPL-MCNC: 0.89 MG/DL (ref 0.6–0.95)
EGFRCR SERPLBLD CKD-EPI 2021: 62 ML/MIN/1.73M2
ERYTHROCYTE [DISTWIDTH] IN BLOOD BY AUTOMATED COUNT: 13.7 % (ref 11–15)
EST. AVERAGE GLUCOSE BLD GHB EST-MCNC: 114 MG/DL
EST. AVERAGE GLUCOSE BLD GHB EST-SCNC: 6.3 MMOL/L
GLUCOSE SERPL-MCNC: 94 MG/DL (ref 65–99)
GLUCOSE UR QL STRIP: NEGATIVE
HBA1C MFR BLD: 5.6 %
HCT VFR BLD AUTO: 44.3 % (ref 35–45)
HGB BLD-MCNC: 13.9 G/DL (ref 11.7–15.5)
HGB UR QL STRIP: NEGATIVE
HYALINE CASTS #/AREA URNS LPF: ABNORMAL /LPF
KETONES UR QL STRIP: ABNORMAL
LEUKOCYTE ESTERASE UR QL STRIP: ABNORMAL
MCH RBC QN AUTO: 31.4 PG (ref 27–33)
MCHC RBC AUTO-ENTMCNC: 31.4 G/DL (ref 32–36)
MCV RBC AUTO: 100.2 FL (ref 80–100)
NITRITE UR QL STRIP: NEGATIVE
PH UR STRIP: 5.5 [PH] (ref 5–8)
PLATELET # BLD AUTO: 369 THOUSAND/UL (ref 140–400)
PMV BLD REES-ECKER: 10.3 FL (ref 7.5–12.5)
POTASSIUM SERPL-SCNC: 4.3 MMOL/L (ref 3.5–5.3)
PROT SERPL-MCNC: 6.9 G/DL (ref 6.1–8.1)
PROT UR QL STRIP: ABNORMAL
RBC # BLD AUTO: 4.42 MILLION/UL (ref 3.8–5.1)
RBC #/AREA URNS HPF: ABNORMAL /HPF
SERVICE CMNT-IMP: ABNORMAL
SODIUM SERPL-SCNC: 142 MMOL/L (ref 135–146)
SP GR UR STRIP: 1.02 (ref 1–1.03)
SQUAMOUS #/AREA URNS HPF: ABNORMAL /HPF
WBC # BLD AUTO: 6.2 THOUSAND/UL (ref 3.8–10.8)
WBC #/AREA URNS HPF: ABNORMAL /HPF

## 2025-09-01 ENCOUNTER — RESULTS FOLLOW-UP (OUTPATIENT)
Dept: PRIMARY CARE | Facility: CLINIC | Age: 89
End: 2025-09-01
Payer: MEDICARE

## 2025-09-01 DIAGNOSIS — N30.01 ACUTE CYSTITIS WITH HEMATURIA: Primary | ICD-10-CM

## 2025-09-01 RX ORDER — CIPROFLOXACIN 500 MG/1
500 TABLET, FILM COATED ORAL 2 TIMES DAILY
Qty: 14 TABLET | Refills: 0 | Status: SHIPPED | OUTPATIENT
Start: 2025-09-01 | End: 2025-09-08

## 2025-12-16 ENCOUNTER — APPOINTMENT (OUTPATIENT)
Dept: PRIMARY CARE | Facility: CLINIC | Age: 89
End: 2025-12-16
Payer: MEDICARE

## (undated) DEVICE — SUTURE, VICRYL PLUS, 4-0, 27 IN, PS-2

## (undated) DEVICE — GLOVE, SURGICAL, PROTEXIS PI W/NEU-THERA, 7.5, PF, LF

## (undated) DEVICE — TROCAR SYSTEM, BALLOON, KII GELPORT, 12 X 100MM

## (undated) DEVICE — STRIP, SKIN CLOSURE, STERI STRIP, REINFORCED, 0.5 X 4 IN

## (undated) DEVICE — PUMP, STRYKERFLOW 2 & HANDPIECE W/10FT. IRRIGATION TUBING

## (undated) DEVICE — TUBE SET, PNEUMOCLEAR, SMOKE EVACU, HIGH-FLOW

## (undated) DEVICE — SCOPE WARMER, LAPAROSCOPE, BAG ONLY, LF

## (undated) DEVICE — ENDO, PORT VERSASTEP 5MM

## (undated) DEVICE — CLIP, ENDO APPLIER LIGAMAX 5MM

## (undated) DEVICE — SUTURE, VICRYL, 0, 27 IN, UR-6, VIOLET

## (undated) DEVICE — APPLICATOR, CHLORAPREP, W/ORANGE TINT, 26ML

## (undated) DEVICE — SHEAR, W/UNIPOLAR CAUTERY, ENDOSHEAR, 5 MM

## (undated) DEVICE — DEVICE, SUTURE, ENDOCLOSE, TROCAR

## (undated) DEVICE — Device

## (undated) DEVICE — RETRIEVAL SYSTEM, MONARCH, 10MM DISP ENDOSCOPIC

## (undated) DEVICE — CORD, MONOPOLAR, HIGH FREQUENCY, W/8MM PLUG F/VALLEYLAB, 8FT/244CM, STRL

## (undated) DEVICE — COVER, TRANSDUCER, LAPROSCOPIC, FOLDED, STERILE, LF